# Patient Record
Sex: MALE | Race: WHITE
[De-identification: names, ages, dates, MRNs, and addresses within clinical notes are randomized per-mention and may not be internally consistent; named-entity substitution may affect disease eponyms.]

---

## 2017-04-07 ENCOUNTER — HOSPITAL ENCOUNTER (OUTPATIENT)
Age: 72
Discharge: HOME | End: 2017-04-07
Payer: MEDICARE

## 2017-04-07 DIAGNOSIS — N44.8: Primary | ICD-10-CM

## 2017-04-07 DIAGNOSIS — R33.8: ICD-10-CM

## 2017-04-07 DIAGNOSIS — N50.89: ICD-10-CM

## 2017-04-07 DIAGNOSIS — N40.0: ICD-10-CM

## 2017-05-04 ENCOUNTER — HOSPITAL ENCOUNTER (OUTPATIENT)
Dept: HOSPITAL 76 - RT | Age: 72
Discharge: HOME | End: 2017-05-04
Attending: INTERNAL MEDICINE
Payer: MEDICARE

## 2017-05-04 DIAGNOSIS — I48.91: Primary | ICD-10-CM

## 2017-05-04 PROCEDURE — 93005 ELECTROCARDIOGRAM TRACING: CPT

## 2017-06-08 ENCOUNTER — HOSPITAL ENCOUNTER (OUTPATIENT)
Dept: HOSPITAL 76 - EMS | Age: 72
Discharge: ANOTHER HOSPITAL | End: 2017-06-08
Attending: SURGERY
Payer: MEDICARE

## 2017-06-08 ENCOUNTER — HOSPITAL ENCOUNTER (EMERGENCY)
Dept: HOSPITAL 76 - ED | Age: 72
Discharge: ANOTHER HOSPITAL | End: 2017-06-08
Payer: MEDICARE

## 2017-06-08 VITALS — SYSTOLIC BLOOD PRESSURE: 115 MMHG | DIASTOLIC BLOOD PRESSURE: 69 MMHG

## 2017-06-08 DIAGNOSIS — I10: ICD-10-CM

## 2017-06-08 DIAGNOSIS — N39.0: Primary | ICD-10-CM

## 2017-06-08 DIAGNOSIS — R10.12: ICD-10-CM

## 2017-06-08 DIAGNOSIS — R65.10: ICD-10-CM

## 2017-06-08 DIAGNOSIS — Z86.73: ICD-10-CM

## 2017-06-08 DIAGNOSIS — I48.91: ICD-10-CM

## 2017-06-08 DIAGNOSIS — R55: ICD-10-CM

## 2017-06-08 DIAGNOSIS — R55: Primary | ICD-10-CM

## 2017-06-08 DIAGNOSIS — E78.00: ICD-10-CM

## 2017-06-08 DIAGNOSIS — R03.1: ICD-10-CM

## 2017-06-08 DIAGNOSIS — Z79.01: ICD-10-CM

## 2017-06-08 LAB
ALBUMIN/GLOB SERPL: 1.3 {RATIO} (ref 1–2.2)
ANION GAP SERPL CALCULATED.4IONS-SCNC: 6 MMOL/L (ref 6–13)
BASOPHILS NFR BLD AUTO: 0.1 10^3/UL (ref 0–0.1)
BASOPHILS NFR BLD AUTO: 0.5 %
BILIRUB BLD-MCNC: 1.4 MG/DL (ref 0.2–1)
BUN SERPL-MCNC: 23 MG/DL (ref 6–20)
CALCIUM UR-MCNC: 8.7 MG/DL (ref 8.5–10.3)
CHLORIDE SERPL-SCNC: 105 MMOL/L (ref 101–111)
CO2 SERPL-SCNC: 27 MMOL/L (ref 21–32)
CREAT SERPLBLD-SCNC: 1.1 MG/DL (ref 0.6–1.2)
CUL URINE ADD CHARGE: (no result)
EOSINOPHIL # BLD AUTO: 0 10^3/UL (ref 0–0.7)
EOSINOPHIL NFR BLD AUTO: 0.1 %
ERYTHROCYTE [DISTWIDTH] IN BLOOD BY AUTOMATED COUNT: 14.2 % (ref 12–15)
GFRSERPLBLD MDRD-ARVRAT: 65 ML/MIN/{1.73_M2} (ref 89–?)
GLOBULIN SER-MCNC: 2.8 G/DL (ref 2.1–4.2)
GLUCOSE SERPL-MCNC: 125 MG/DL (ref 70–100)
HCT VFR BLD AUTO: 41.4 % (ref 42–52)
HGB UR QL STRIP: 13.7 G/DL (ref 14–18)
LIPASE SERPL-CCNC: 23 U/L (ref 22–51)
LYMPHOCYTES # SPEC AUTO: 1.1 10^3/UL (ref 1.5–3.5)
LYMPHOCYTES NFR BLD AUTO: 4.7 %
MCH RBC QN AUTO: 32.4 PG (ref 27–31)
MCHC RBC AUTO-ENTMCNC: 33.1 G/DL (ref 32–36)
MCV RBC AUTO: 97.7 FL (ref 80–94)
MONOCYTES # BLD AUTO: 1.7 10^3/UL (ref 0–1)
MONOCYTES NFR BLD AUTO: 7.3 %
NEUTROPHILS # BLD AUTO: 20.3 10^3/UL (ref 1.5–6.6)
NEUTROPHILS # SNV AUTO: 23.2 X10^3/UL (ref 4.8–10.8)
NEUTROPHILS NFR BLD AUTO: 87.4 %
NP AUTO DIFFERENTIAL?: NO
NP MAN DIFFERENTIAL?: YES
NRBC # BLD AUTO: 0 /100WBC
PDW BLD AUTO: 9.1 FL (ref 7.4–11.4)
PH UR STRIP.AUTO: 7 PH (ref 5–7.5)
PLAT MORPH BLD: (no result)
PLATELET BLD QL SMEAR: (no result)
POTASSIUM SERPL-SCNC: 4.1 MMOL/L (ref 3.5–5)
PROT SPEC-MCNC: 6.4 G/DL (ref 6.7–8.2)
RBC MAR: 4.23 10^6/UL (ref 4.7–6.1)
SODIUM SERPLBLD-SCNC: 138 MMOL/L (ref 135–145)
SP GR UR STRIP.AUTO: 1.01 (ref 1–1.03)
UA CHARGE (STRIP ONLY): (no result)
UA W/ MICROSCOPIC CHARGE: YES
UR CULTURE IF IND: (no result)
UROBILINOGEN UR STRIP.AUTO-MCNC: NEGATIVE MG/DL
WBC # BLD: 23.2 X10^3/UL
WBC # UR MANUAL: >25 /HPF (ref 0–3)

## 2017-06-08 PROCEDURE — 83605 ASSAY OF LACTIC ACID: CPT

## 2017-06-08 PROCEDURE — 85025 COMPLETE CBC W/AUTO DIFF WBC: CPT

## 2017-06-08 PROCEDURE — 87181 SC STD AGAR DILUTION PER AGT: CPT

## 2017-06-08 PROCEDURE — 81003 URINALYSIS AUTO W/O SCOPE: CPT

## 2017-06-08 PROCEDURE — 71010: CPT

## 2017-06-08 PROCEDURE — 93005 ELECTROCARDIOGRAM TRACING: CPT

## 2017-06-08 PROCEDURE — 99285 EMERGENCY DEPT VISIT HI MDM: CPT

## 2017-06-08 PROCEDURE — 80053 COMPREHEN METABOLIC PANEL: CPT

## 2017-06-08 PROCEDURE — 83690 ASSAY OF LIPASE: CPT

## 2017-06-08 PROCEDURE — 36415 COLL VENOUS BLD VENIPUNCTURE: CPT

## 2017-06-08 PROCEDURE — 87040 BLOOD CULTURE FOR BACTERIA: CPT

## 2017-06-08 PROCEDURE — 87086 URINE CULTURE/COLONY COUNT: CPT

## 2017-06-08 PROCEDURE — 96361 HYDRATE IV INFUSION ADD-ON: CPT

## 2017-06-08 PROCEDURE — 81001 URINALYSIS AUTO W/SCOPE: CPT

## 2017-06-08 PROCEDURE — 96365 THER/PROPH/DIAG IV INF INIT: CPT

## 2017-06-08 PROCEDURE — 87077 CULTURE AEROBIC IDENTIFY: CPT

## 2017-06-08 PROCEDURE — 84484 ASSAY OF TROPONIN QUANT: CPT

## 2017-06-08 PROCEDURE — 93010 ELECTROCARDIOGRAM REPORT: CPT

## 2017-06-08 NOTE — ED PHYSICIAN DOCUMENTATION
PD HPI SYNCOPE





- Stated complaint


Stated Complaint: NEAR SYNCOPE





- Chief complaint


Chief Complaint: General





- History obtained from


History obtained from: Patient, EMS





- History of Present Illness


Witnessed: Witnessed (spouse)


Timing - onset: Today


Preceding symptoms: Generalized weakness


Contributing factors: Other (Self caths for chronic urinary obstruction.)


Injury occurred: None





- Additional information


Additional information: 


The patient is a 72-year-old male with history of CVA, chronic atrial 

fibrillation, and urinary retention, who presents via ambulance after a near 

syncopal episode this morning. He was sitting at the breakfast table when other 

family members noticed him slumping his head and not responding to verbal 

stimulation. He did not fall, and remained sitting in the chair. They called 911

, and when medics arrived his blood pressure was 50 systolic. An IV was 

established and he was transported without event. Upon arrival in the emergency 

department he reports slight left-sided chest pressure. He denies shortness of 

breath, nausea, vomiting, or abdominal pain. He was chilled during the night, 

and family members report he had cold sweats this morning.


He has no history of similar symptoms in the past.








Review of Systems


Constitutional: reports: Chills


Ears: denies: Tinnitus/ringing


Nose: denies: Congestion


Throat: denies: Sore throat


Cardiac: reports: Chest pain / pressure.  denies: Palpitations


Respiratory: denies: Dyspnea, Cough


GI: denies: Abdominal Pain, Nausea, Vomiting


: reports: Other (self caths).  denies: Dysuria


Skin: denies: Rash


Musculoskeletal: denies: Back pain, Extremity pain


Neurologic: reports: Near syncope.  denies: Headache





PD PAST MEDICAL HISTORY





- Past Medical History


Past Medical History: Yes


Cardiovascular: Hypertension, High cholesterol, Atrial fibrillation


Neuro: CVA


: Retention


Musculoskeletal: Chronic back pain





- Past Surgical History


Past Surgical History: Yes


General: Splenectomy


Ortho: Hip replacement


HEENT: Tonsil/Adenoidectomy





- Present Medications


Home Medications: 


 Ambulatory Orders











 Medication  Instructions  Recorded  Confirmed


 


Dabigatran [Pradaxa] 0 mg PO DAILY 09/22/13 06/08/17


 


Tamsulosin [Flomax] 0.4 mg BID 06/08/17 06/08/17














- Allergies


Allergies/Adverse Reactions: 


 Allergies











Allergy/AdvReac Type Severity Reaction Status Date / Time


 


No Known Drug Allergies Allergy   Verified 06/08/17 11:37














- Social History


Does the pt smoke?: No


Smoking Status: Never smoker


Does the pt drink ETOH?: No


Does the pt have substance abuse?: No





- Immunizations


Immunizations are current?: Yes





PD ED PE NORMAL





- Vitals


Vital signs reviewed: Yes (blood pressure 101/53 with pulse 88.)





- General


General: Alert and oriented X 3





- HEENT


HEENT: Atraumatic, EOMI, Pharynx benign





- Neck


Neck: Supple, no meningeal sign, No adenopathy





- Cardiac


Cardiac: No murmur, Other (Regular rate, irregularly irregular rhythm.)





- Respiratory


Respiratory: No respiratory distress, Clear bilaterally





- Abdomen


Abdomen: Soft, Non tender





- Back


Back: No CVA TTP





- Derm


Derm: No rash





- Extremities


Extremities: No edema, No calf tenderness / cord





- Neuro


Neuro: No motor deficit, Other (Alert, oriented, but mildly confused, 

preferring that his wife answer questions.)





Results





- Vitals


Vitals: 


 Vital Signs - 24 hr











  06/08/17 06/08/17 06/08/17





  11:31 11:41 11:45


 


Temperature 36.4 C L  


 


Heart Rate 88 79 76


 


Respiratory 20 16 16





Rate   


 


Blood Pressure 101/53 L 97/58 L 101/59 L


 


O2 Saturation 96 98 














  06/08/17 06/08/17 06/08/17





  12:00 12:15 12:30


 


Temperature   


 


Heart Rate 73 73 74


 


Respiratory 16 16 16





Rate   


 


Blood Pressure 109/61 95/61 109/60


 


O2 Saturation 100 99 99














  06/08/17 06/08/17 06/08/17





  12:45 13:00 13:30


 


Temperature   


 


Heart Rate 73 74 72


 


Respiratory 16 16 16





Rate   


 


Blood Pressure 101/65 102/68 100/77


 


O2 Saturation  99 97














  06/08/17 06/08/17 06/08/17





  14:00 14:30 15:00


 


Temperature   


 


Heart Rate 72 76 70


 


Respiratory 16 20 16





Rate   


 


Blood Pressure 93/58 L 109/59 L 108/57 L


 


O2 Saturation 97  96














  06/08/17 06/08/17 06/08/17





  16:00 17:00 18:13


 


Temperature   36.6 C


 


Heart Rate 70 70 71


 


Respiratory 16 16 15





Rate   


 


Blood Pressure 113/70 102/78 109/66


 


O2 Saturation 99  96














  06/08/17 06/08/17 06/08/17





  19:19 19:54 20:54


 


Temperature 36.4 C L  36.9 C


 


Heart Rate 65 68 68


 


Respiratory 15 18 16





Rate   


 


Blood Pressure 108/65 113/63 112/62


 


O2 Saturation 99 98 99














  06/08/17





  21:45


 


Temperature 


 


Heart Rate 67


 


Respiratory 18





Rate 


 


Blood Pressure 115/69


 


O2 Saturation 98








 Oxygen











O2 Source                      Room air

















- EKG (time done)


  ** 11:38


Rate: Rate (enter#) (68)


Rhythm: Atrial fibrillation


Axis: Normal


Ischemia: ST elevation c/w ischemia (Minimal ST elevation in anterior leads V2.)


Computer interpretation: Agree with computer





- Labs


Labs: 


 Laboratory Tests











  06/08/17 06/08/17 06/08/17





  12:06 12:06 12:06


 


WBC  23.2 H  


 


RBC  4.23 L  


 


Hgb  13.7 L  


 


Hct  41.4 L  


 


MCV  97.7 H  


 


MCH  32.4 H  


 


MCHC  33.1  


 


RDW  14.2  


 


Plt Count  228  


 


MPV  9.1  


 


Neut #  20.3 H  


 


Lymph #  1.1 L  


 


Mono #  1.7 H  


 


Eos #  0.0  


 


Baso #  0.1  


 


Absolute Nucleated RBC  0.00  


 


Band Neuts % (Manual)  Not Reportable  


 


Nucleated RBCs  0.0  


 


Differential Comment  MANUAL=AUTO DIFF  


 


Manual Slide Review  Indicated  


 


Platelet Estimate  NORMAL (130-450,000)  


 


Platelet Morphology  NORMAL APPEARANCE  


 


RBC Morph Micro Appear  NORMAL APPEARANCE  


 


Sodium   138 


 


Potassium   4.1 


 


Chloride   105 


 


Carbon Dioxide   27 


 


Anion Gap   6.0 


 


BUN   23 H 


 


Creatinine   1.1 


 


Estimated GFR (MDRD)   65 L 


 


Glucose   125 H 


 


Lactic Acid   


 


Calcium   8.7 


 


Total Bilirubin   1.4 H 


 


AST   18 


 


ALT   14 


 


Alkaline Phosphatase   61 


 


Troponin I    < 0.04


 


Total Protein   6.4 L 


 


Albumin   3.6 


 


Globulin   2.8 


 


Albumin/Globulin Ratio   1.3 


 


Lipase   23 


 


Urine Color   


 


Urine Clarity   


 


Urine pH   


 


Ur Specific Gravity   


 


Urine Protein   


 


Urine Glucose (UA)   


 


Urine Ketones   


 


Urine Occult Blood   


 


Urine Nitrite   


 


Urine Bilirubin   


 


Urine Urobilinogen   


 


Ur Leukocyte Esterase   


 


Urine RBC   


 


Urine WBC   


 


Ur Squamous Epith Cells   


 


Urine Bacteria   


 


Ur Microscopic Review   


 


Urine Culture Comments   














  06/08/17 06/08/17





  14:24 15:15


 


WBC  


 


RBC  


 


Hgb  


 


Hct  


 


MCV  


 


MCH  


 


MCHC  


 


RDW  


 


Plt Count  


 


MPV  


 


Neut #  


 


Lymph #  


 


Mono #  


 


Eos #  


 


Baso #  


 


Absolute Nucleated RBC  


 


Band Neuts % (Manual)  


 


Nucleated RBCs  


 


Differential Comment  


 


Manual Slide Review  


 


Platelet Estimate  


 


Platelet Morphology  


 


RBC Morph Micro Appear  


 


Sodium  


 


Potassium  


 


Chloride  


 


Carbon Dioxide  


 


Anion Gap  


 


BUN  


 


Creatinine  


 


Estimated GFR (MDRD)  


 


Glucose  


 


Lactic Acid   1.1


 


Calcium  


 


Total Bilirubin  


 


AST  


 


ALT  


 


Alkaline Phosphatase  


 


Troponin I  


 


Total Protein  


 


Albumin  


 


Globulin  


 


Albumin/Globulin Ratio  


 


Lipase  


 


Urine Color  YELLOW 


 


Urine Clarity  CLOUDY 


 


Urine pH  7.0 


 


Ur Specific Gravity  1.010 


 


Urine Protein  TRACE 


 


Urine Glucose (UA)  NEGATIVE 


 


Urine Ketones  NEGATIVE 


 


Urine Occult Blood  TRACE-LYSE 


 


Urine Nitrite  POSITIVE H 


 


Urine Bilirubin  NEGATIVE 


 


Urine Urobilinogen  0.2 (NORMAL) 


 


Ur Leukocyte Esterase  MODERATE H 


 


Urine RBC  0-5 


 


Urine WBC  >25 H 


 


Ur Squamous Epith Cells  FEW Squamous 


 


Urine Bacteria  Many H 


 


Ur Microscopic Review  INDICATED 


 


Urine Culture Comments  INDICATED 














- Rads (name of study)


  ** Portable CXR


Radiology: Prelim report reviewed, EMP read contemporaneously, See rad report (

No consolidation evident.)





PD MEDICAL DECISION MAKING





- ED course


Complexity details: reviewed results, re-evaluated patient, considered 

differential, d/w patient, d/w family, d/w consultant


ED course: 





The patient's presentation is significant for urinary tract infection with 

systemic inflammatory response syndrome. His urine is positive for greater than 

25 white cells per high-power field, and his white blood cell count is elevated 

at 23.2. Lactate level is normal at 1.1. Blood cultures x2 were drawn and are 

pending.


Electrocardiogram reveals no acute ischemic abnormalities, and troponin level 

is normal.


Treatment in the emergency department included administration of 4 baby aspirin 

orally. Nitroglycerin was considered, but the patient's mild chest discomfort 

completely resolved spontaneously. Normal saline 2 L administered IV, and 

ceftriaxone 1 g was administered IV after blood cultures had been drawn.


I discussed the patient's condition with transfer staff at [facility], who arranged 

for ambulance transport to [facility]. He is being 

transferred by \[facility]\"" ambulance. Transfer forms were completed. It should be noted 

that there was a long delay between the decision to transfer the patient and 

the arrival of the \[facility]\"" transfer crew, causing great consternation and 

dissatisfaction on the part of the patient's wife.





Departure





- Departure


Disposition: 02 Transfer Acute Care Hosp


Clinical Impression: 


 Near syncope, SIRS (systemic inflammatory response syndrome), Chronic atrial 

fibrillation





Urinary tract infection


Qualifiers:


 Urinary tract infection type: site unspecified Hematuria presence: with 

hematuria Qualified Code(s): N39.0 - Urinary tract infection, site not specified


Condition: Fair


Discharge Date/Time: 06/08/17 21:59

## 2017-06-08 NOTE — XRAY PRELIMINARY REPORT
Accession: [id number]

Exam: XR Chest 1 View

 

IMPRESSION: No consolidation evident.

 

[facility]

 

SITE ID: [id number]

## 2017-06-08 NOTE — XRAY REPORT
EXAM: 

CHEST RADIOGRAPHY

 

EXAM DATE: 6/8/2017 12:47 PM.

 

CLINICAL HISTORY: Chest pain.

 

COMPARISON: None.

 

TECHNIQUE: 1 view. AP portable

 

FINDINGS:

Lungs/Pleura: No focal opacities evident. No pleural effusion. No pneumothorax.

 

Mediastinum: Atherosclerotic aortic calcifications.

 

Other: None.

 

IMPRESSION: No consolidation evident.

 

[facility]

Referring Provider Line: [phone]

 

SITE ID: [id number]

## 2017-09-26 ENCOUNTER — HOSPITAL ENCOUNTER (EMERGENCY)
Dept: HOSPITAL 76 - ED | Age: 72
Discharge: HOME | End: 2017-09-26
Payer: MEDICARE

## 2017-09-26 VITALS — SYSTOLIC BLOOD PRESSURE: 130 MMHG | DIASTOLIC BLOOD PRESSURE: 79 MMHG

## 2017-09-26 DIAGNOSIS — Z86.73: ICD-10-CM

## 2017-09-26 DIAGNOSIS — Z87.891: ICD-10-CM

## 2017-09-26 DIAGNOSIS — I10: ICD-10-CM

## 2017-09-26 DIAGNOSIS — N30.00: Primary | ICD-10-CM

## 2017-09-26 DIAGNOSIS — Z96.649: ICD-10-CM

## 2017-09-26 DIAGNOSIS — E78.00: ICD-10-CM

## 2017-09-26 LAB
CUL URINE ADD CHARGE: (no result)
PH UR STRIP.AUTO: 6 PH (ref 5–7.5)
SP GR UR STRIP.AUTO: 1.01 (ref 1–1.03)
UA CHARGE (STRIP ONLY): (no result)
UA W/ MICROSCOPIC CHARGE: YES
UR CULTURE IF IND: (no result)
UROBILINOGEN UR STRIP.AUTO-MCNC: NEGATIVE MG/DL
WBC # UR MANUAL: >25 /HPF (ref 0–3)

## 2017-09-26 PROCEDURE — 99284 EMERGENCY DEPT VISIT MOD MDM: CPT

## 2017-09-26 PROCEDURE — 81001 URINALYSIS AUTO W/SCOPE: CPT

## 2017-09-26 PROCEDURE — 87086 URINE CULTURE/COLONY COUNT: CPT

## 2017-09-26 PROCEDURE — 81003 URINALYSIS AUTO W/O SCOPE: CPT

## 2017-09-26 PROCEDURE — 99283 EMERGENCY DEPT VISIT LOW MDM: CPT

## 2017-09-26 NOTE — ED PHYSICIAN DOCUMENTATION
PD HPI MALE 





- Stated complaint


Stated Complaint: DIZZY





- Chief complaint


Chief Complaint: General





- History obtained from


History obtained from: Patient, Family





- History of Present Illness


Timing - onset: How many days ago (5)


Timing - duration: Days (5)


Timing - details: Gradual onset, Still present


Associated symptoms: Other (light headed/dizzy periodically for the past week 

progressed.)


PD HPI MALE  CONTRIB FACTORS: Other (self caths 2 times per day)


Similar symptoms before: Diagnosis (UTI)


Recently seen: Not recently seen





- Additional information


Additional information: 





72-year-old male status post CVA who has had chronic atrial fibrillation and 

urinary retention does self cathing twice per day and he is able to void in 

between.  He has had issues with infection.  He was admitted to [facility] in 

[place] in June of this year with urosepsis.  He has had a second infection in 

August of this year which was discovered when they went to do a cystoscopy.  He 

does not recall the antibiotics she was on with these. The patient's wife saw 

that he was having some trouble and she felt that he might be dehydrated and 

had him drink 4 glasses of water.  The patient states that he does feel 

somewhat better now.





Review of Systems


Constitutional: reports: Fatigue, Sweats.  denies: Fever, Chills


Eyes: denies: Decreased vision


Ears: denies: Ear pain


Nose: denies: Congestion


Throat: denies: Sore throat


Cardiac: denies: Chest pain / pressure, Palpitations


Respiratory: denies: Dyspnea, Cough


GI: denies: Abdominal Pain, Nausea, Vomiting


: denies: Dysuria


Skin: denies: Rash


Musculoskeletal: denies: Neck pain, Back pain, Extremity pain


Neurologic: reports: Other (dizziness).  denies: Generalized weakness, Focal 

weakness, Numbness





PD PAST MEDICAL HISTORY





- Past Medical History


Cardiovascular: Hypertension, High cholesterol, Atrial fibrillation


Respiratory: None


Neuro: CVA


Endocrine/Autoimmune: None


GI: None


: Benign prostate hypertrophy, Retention


Psych: None


Musculoskeletal: Chronic back pain


Derm: None





- Past Surgical History


Past Surgical History: Yes


General: Splenectomy


Ortho: Hip replacement


HEENT: Tonsil/Adenoidectomy





- Present Medications


Home Medications: 


 Ambulatory Orders











 Medication  Instructions  Recorded  Confirmed


 


Dabigatran [Pradaxa] 0 mg PO DAILY 09/22/13 09/26/17


 


Tamsulosin [Flomax] 0.4 mg PO BID 06/08/17 09/26/17


 


Cholecalciferol (Vitamin D3) 1,000 mg PO DAILY 09/26/17 09/26/17





[Vitamin D3]   


 


Levofloxacin [Levaquin] 500 mg PO DAILY #7 tablet 09/26/17 














- Allergies


Allergies/Adverse Reactions: 


 Allergies











Allergy/AdvReac Type Severity Reaction Status Date / Time


 


No Known Drug Allergies Allergy   Verified 06/08/17 11:37














- Social History


Does the pt smoke?: No


Smoking Status: Former smoker


Does the pt drink ETOH?: Yes


Does the pt have substance abuse?: No





- Immunizations


Immunizations are current?: Yes





PD ED PE NORMAL





- Vitals


Vital signs reviewed: Yes (hypertensive mild )





- General


General: No acute distress, Well developed/nourished





- HEENT


HEENT: Atraumatic, PERRL





- Neck


Neck: Supple, no meningeal sign





- Cardiac


Cardiac: No murmur, Other (irregularly irregular)





- Respiratory


Respiratory: No respiratory distress, Clear bilaterally





- Abdomen


Abdomen: Soft, Non tender





- Back


Back: No CVA TTP, No spinal TTP





- Extremities


Extremities: No deformity, No edema





- Neuro


Neuro: No motor deficit, No sensory deficit





- Psych


Psych: Normal mood, Normal affect





Results





- Vitals


Vitals: 





 Vital Signs - 24 hr











  09/26/17 09/26/17





  11:58 12:32


 


Temperature 36.6 C 


 


Heart Rate 60 70


 


Respiratory 18 16





Rate  


 


Blood Pressure 124/86 H 130/91 H


 


O2 Saturation 99 97








 Oxygen











O2 Source                      Room air

















- Labs


Labs: 





 Laboratory Tests











  09/26/17





  12:18


 


Urine Color  YELLOW


 


Urine Clarity  CLEAR


 


Urine pH  6.0


 


Ur Specific Gravity  1.010


 


Urine Protein  NEGATIVE


 


Urine Glucose (UA)  NEGATIVE


 


Urine Ketones  NEGATIVE


 


Urine Occult Blood  TRACE-INTA


 


Urine Nitrite  POSITIVE H


 


Urine Bilirubin  NEGATIVE


 


Urine Urobilinogen  0.2 (NORMAL)


 


Ur Leukocyte Esterase  SMALL H


 


Urine RBC  0-5


 


Urine WBC  >25 H


 


Ur Squamous Epith Cells  RARE Squamous


 


Urine Bacteria  Few


 


Urine Mucus  Few Strands


 


Ur Microscopic Review  INDICATED


 


Urine Culture Comments  INDICATED














Procedures





- IVC sono (time)


  ** 1340


Bedside IVC sono: IVC measures (cm) (1.74), Euvolemia





PD MEDICAL DECISION MAKING





- ED course


Complexity details: reviewed old records, reviewed results, re-evaluated patient

, considered differential, d/w patient, d/w family


ED course: 





72-year-old male has had infection with self cathing.  Today he has some 

symptoms to suggest possible infection and review of the urine under the 

microscope shows obvious evidence of infection.  We have cultures here of 2 

separate species of E. coli both which are sensitive to Levaquin.  He is 

administered Levaquin 500 mg orally in the emergency department.  He does not 

appear ill or septic at this time.





Departure





- Departure


Disposition: 01 Home, Self Care


Clinical Impression: 


Urinary tract infection


Qualifiers:


 Urinary tract infection type: acute cystitis Hematuria presence: without 

hematuria Qualified Code(s): N30.00 - Acute cystitis without hematuria





Condition: Stable


Instructions:  ED UTI Cystitis Male


Follow-Up: 


Oleksandr Zamorano MD [Primary Care Provider] - 


Prescriptions: 


Levofloxacin [Levaquin] 500 mg PO DAILY #7 tablet

## 2018-01-21 ENCOUNTER — HOSPITAL ENCOUNTER (EMERGENCY)
Dept: HOSPITAL 76 - ED | Age: 73
Discharge: HOME | End: 2018-01-21
Payer: MEDICARE

## 2018-01-21 VITALS — SYSTOLIC BLOOD PRESSURE: 132 MMHG | DIASTOLIC BLOOD PRESSURE: 96 MMHG

## 2018-01-21 DIAGNOSIS — Z86.73: ICD-10-CM

## 2018-01-21 DIAGNOSIS — I10: ICD-10-CM

## 2018-01-21 DIAGNOSIS — E78.00: ICD-10-CM

## 2018-01-21 DIAGNOSIS — N40.0: ICD-10-CM

## 2018-01-21 DIAGNOSIS — I48.91: ICD-10-CM

## 2018-01-21 DIAGNOSIS — Z79.01: ICD-10-CM

## 2018-01-21 DIAGNOSIS — R55: Primary | ICD-10-CM

## 2018-01-21 LAB
ANION GAP SERPL CALCULATED.4IONS-SCNC: 11 MMOL/L (ref 6–13)
BASOPHILS NFR BLD AUTO: 0.1 10^3/UL (ref 0–0.1)
BASOPHILS NFR BLD AUTO: 1.1 %
BUN SERPL-MCNC: 14 MG/DL (ref 6–20)
CALCIUM UR-MCNC: 9.5 MG/DL (ref 8.5–10.3)
CHLORIDE SERPL-SCNC: 99 MMOL/L (ref 101–111)
CLARITY UR REFRACT.AUTO: CLEAR
CO2 SERPL-SCNC: 26 MMOL/L (ref 21–32)
CREAT SERPLBLD-SCNC: 0.9 MG/DL (ref 0.6–1.2)
EOSINOPHIL # BLD AUTO: 0.2 10^3/UL (ref 0–0.7)
EOSINOPHIL NFR BLD AUTO: 2.3 %
ERYTHROCYTE [DISTWIDTH] IN BLOOD BY AUTOMATED COUNT: 13.5 % (ref 12–15)
GFRSERPLBLD MDRD-ARVRAT: 82 ML/MIN/{1.73_M2} (ref 89–?)
GLUCOSE SERPL-MCNC: 133 MG/DL (ref 70–100)
GLUCOSE UR QL STRIP.AUTO: NEGATIVE MG/DL
HGB UR QL STRIP: 15.9 G/DL (ref 14–18)
KETONES UR QL STRIP.AUTO: NEGATIVE MG/DL
LYMPHOCYTES # SPEC AUTO: 1.6 10^3/UL (ref 1.5–3.5)
LYMPHOCYTES NFR BLD AUTO: 16.1 %
MCH RBC QN AUTO: 33 PG (ref 27–31)
MCHC RBC AUTO-ENTMCNC: 33.9 G/DL (ref 32–36)
MCV RBC AUTO: 97.3 FL (ref 80–94)
MONOCYTES # BLD AUTO: 0.6 10^3/UL (ref 0–1)
MONOCYTES NFR BLD AUTO: 6.7 %
NEUTROPHILS # BLD AUTO: 7.1 10^3/UL (ref 1.5–6.6)
NEUTROPHILS # SNV AUTO: 9.7 X10^3/UL (ref 4.8–10.8)
NEUTROPHILS NFR BLD AUTO: 73.8 %
NITRITE UR QL STRIP.AUTO: NEGATIVE
PDW BLD AUTO: 9.4 FL (ref 7.4–11.4)
PH UR STRIP.AUTO: 7.5 PH (ref 5–7.5)
PLAT MORPH BLD: (no result)
PLATELET # BLD: 216 10^3/UL (ref 130–450)
PLATELET BLD QL SMEAR: (no result)
PROT UR STRIP.AUTO-MCNC: NEGATIVE MG/DL
RBC # UR STRIP.AUTO: NEGATIVE /UL
RBC MAR: 4.83 10^6/UL (ref 4.7–6.1)
RBC MORPH BLD: (no result)
SODIUM SERPLBLD-SCNC: 136 MMOL/L (ref 135–145)
SP GR UR STRIP.AUTO: 1.01 (ref 1–1.03)
UROBILINOGEN UR QL STRIP.AUTO: (no result) E.U./DL
UROBILINOGEN UR STRIP.AUTO-MCNC: NEGATIVE MG/DL

## 2018-01-21 PROCEDURE — 80048 BASIC METABOLIC PNL TOTAL CA: CPT

## 2018-01-21 PROCEDURE — 83605 ASSAY OF LACTIC ACID: CPT

## 2018-01-21 PROCEDURE — 87086 URINE CULTURE/COLONY COUNT: CPT

## 2018-01-21 PROCEDURE — 87040 BLOOD CULTURE FOR BACTERIA: CPT

## 2018-01-21 PROCEDURE — 99284 EMERGENCY DEPT VISIT MOD MDM: CPT

## 2018-01-21 PROCEDURE — 81001 URINALYSIS AUTO W/SCOPE: CPT

## 2018-01-21 PROCEDURE — 93005 ELECTROCARDIOGRAM TRACING: CPT

## 2018-01-21 PROCEDURE — 81003 URINALYSIS AUTO W/O SCOPE: CPT

## 2018-01-21 PROCEDURE — 99283 EMERGENCY DEPT VISIT LOW MDM: CPT

## 2018-01-21 PROCEDURE — 85025 COMPLETE CBC W/AUTO DIFF WBC: CPT

## 2018-01-21 PROCEDURE — 36415 COLL VENOUS BLD VENIPUNCTURE: CPT

## 2018-01-21 NOTE — ED PHYSICIAN DOCUMENTATION
History of Present Illness





- Stated complaint


Stated Complaint: LIGHT HEADED





- Chief complaint


Chief Complaint: General





- Additonal information


Additional information: 





hx from pt


72 male


had approx 15 min of diffuse weakness this AM


no focal numbness or weakness


no trouble with vision hearing speech


no CP or palpitations


no SOA


not vertigo


sx better now


no new meds or med changes


he occasionally gets UTIs and want to be checked for that








Review of Systems


Constitutional: denies: Fever, Chills


Cardiac: denies: Chest pain / pressure, Palpitations


Respiratory: denies: Dyspnea, Cough


GI: denies: Abdominal Pain, Nausea, Vomiting, Diarrhea, Bloody / black stool


: reports: Other (self cath)


Neurologic: reports: Generalized weakness (X 15 min several hr ago better now)


Endocrine: reports: Easy bruising / bleeding





PD PAST MEDICAL HISTORY





- Past Medical History


Past Medical History: Yes


Cardiovascular: Hypertension, High cholesterol, Atrial fibrillation


Respiratory: None


Neuro: CVA


Endocrine/Autoimmune: None


GI: None


: Benign prostate hypertrophy, Retention


Psych: None


Musculoskeletal: Chronic back pain


Derm: None





- Past Surgical History


Past Surgical History: Yes


General: Splenectomy


Ortho: Hip replacement


HEENT: Tonsil/Adenoidectomy





- Present Medications


Home Medications: 


 Ambulatory Orders











 Medication  Instructions  Recorded  Confirmed


 


Dabigatran [Pradaxa] 0 mg PO DAILY 09/22/13 01/21/18


 


Tamsulosin [Flomax] 0.4 mg PO BID 06/08/17 01/21/18


 


Cholecalciferol (Vitamin D3) 1,000 mg PO DAILY 09/26/17 01/21/18





[Vitamin D3]   














- Allergies


Allergies/Adverse Reactions: 


 Allergies











Allergy/AdvReac Type Severity Reaction Status Date / Time


 


No Known Drug Allergies Allergy   Verified 01/21/18 10:21














- Social History


Does the pt smoke?: No


Smoking Status: Never smoker


Does the pt drink ETOH?: Yes


Does the pt have substance abuse?: No





- Immunizations


Immunizations are current?: Yes





PD ED PE NORMAL





- Vitals


Vital signs reviewed: Yes (small dip in BP sitting up but not with standing and 

pt had no sx standing )





- General


General: Alert and oriented X 3





- HEENT


HEENT: PERRL





- Neck


Neck: Supple, no meningeal sign





- Cardiac


Cardiac: RRR





- Respiratory


Respiratory: No respiratory distress, Clear bilaterally





- Abdomen


Abdomen: Soft, Non tender





- Derm


Derm: Normal color





- Neuro


Neuro: Alert and oriented X 3, CNs 2-12 intact, No motor deficit, No sensory 

deficit, Normal speech





Results





- Vitals


Vitals: 


 Vital Signs - 24 hr











  01/21/18 01/21/18 01/21/18





  10:15 11:52 13:25


 


Temperature 36.2 C L  


 


Heart Rate 76  73


 


Heart Rate [  77 





Sitting]   


 


Heart Rate [  91 





Standing]   


 


Heart Rate [  65 





Supine]   


 


Respiratory 16  20





Rate   


 


Blood Pressure 122/77  132/96 H


 


Blood Pressure  95/63 





[Sitting]   


 


Blood Pressure  115/75 





[Standing]   


 


Blood Pressure  114/73 





[Supine]   


 


O2 Saturation 99  99








 Oxygen











O2 Source                      Room air

















- EKG (time done)


  ** 1139


Rate: Rate (enter#) (44)


Rhythm: Atrial fibrillation


Ischemia: Normal ST segments





- Labs


Labs: 


 Laboratory Tests











  01/21/18 01/21/18 01/21/18





  10:25 10:43 10:43


 


WBC   9.7 


 


RBC   4.83 


 


Hgb   15.9 


 


Hct   46.9 


 


MCV   97.3 H 


 


MCH   33.0 H 


 


MCHC   33.9 


 


RDW   13.5 


 


Plt Count   216 


 


MPV   9.4 


 


Neut #   7.1 H 


 


Lymph #   1.6 


 


Mono #   0.6 


 


Eos #   0.2 


 


Baso #   0.1 


 


Absolute Nucleated RBC   0.01 


 


Nucleated RBC %   0.1 


 


Manual Slide Review   Indicated 


 


Platelet Estimate   NORMAL (130-450,000) 


 


Platelet Morphology   1+ LARGE PLATELETS 


 


RBC Morph Micro Appear   NORMAL APPEARANCE 


 


Sodium    136


 


Potassium    4.0


 


Chloride    99 L


 


Carbon Dioxide    26


 


Anion Gap    11.0


 


BUN    14


 


Creatinine    0.9


 


Estimated GFR (MDRD)    82 L


 


Glucose    133 H


 


Lactic Acid   


 


Calcium    9.5


 


Urine Color  YELLOW  


 


Urine Clarity  CLEAR  


 


Urine pH  7.5  


 


Ur Specific Gravity  1.015  


 


Urine Protein  NEGATIVE  


 


Urine Glucose (UA)  NEGATIVE  


 


Urine Ketones  NEGATIVE  


 


Urine Occult Blood  NEGATIVE  


 


Urine Nitrite  NEGATIVE  


 


Urine Bilirubin  NEGATIVE  


 


Urine Urobilinogen  0.2 (NORMAL)  


 


Ur Leukocyte Esterase  NEGATIVE  


 


Ur Microscopic Review  NOT INDICATED  


 


Urine Culture Comments  NOT INDICATED  














  01/21/18





  10:43


 


WBC 


 


RBC 


 


Hgb 


 


Hct 


 


MCV 


 


MCH 


 


MCHC 


 


RDW 


 


Plt Count 


 


MPV 


 


Neut # 


 


Lymph # 


 


Mono # 


 


Eos # 


 


Baso # 


 


Absolute Nucleated RBC 


 


Nucleated RBC % 


 


Manual Slide Review 


 


Platelet Estimate 


 


Platelet Morphology 


 


RBC Morph Micro Appear 


 


Sodium 


 


Potassium 


 


Chloride 


 


Carbon Dioxide 


 


Anion Gap 


 


BUN 


 


Creatinine 


 


Estimated GFR (MDRD) 


 


Glucose 


 


Lactic Acid  1.7


 


Calcium 


 


Urine Color 


 


Urine Clarity 


 


Urine pH 


 


Ur Specific Gravity 


 


Urine Protein 


 


Urine Glucose (UA) 


 


Urine Ketones 


 


Urine Occult Blood 


 


Urine Nitrite 


 


Urine Bilirubin 


 


Urine Urobilinogen 


 


Ur Leukocyte Esterase 


 


Ur Microscopic Review 


 


Urine Culture Comments 














PD MEDICAL DECISION MAKING





- ED course


ED course: 





traige VS and orthostatioc VS all fine but EKG HR  was 44


will recheck


not anemic


nl lytes


sx were not c/w stroke


no UTI


he has had prior SIRS /sepsis 2/2 UTI but that does not seem to be the issue 

today


if anything cause this episode of weakness and low BP I suspect it was 

bradycardia


suggested place in obs but pt and wife reluctant to do that so will try and get 

echo in ER and keep pt on tele for several more hr


echo not available today


and when I tono to check in on pt I found him standing at the end of his bed 

doing calisthenics


think he is OK to dc for today - with wife to keep an eye on him, they have a 

BP HR cuff to monitor VS, see PMD tomorrow for recheck and get echo arrange








Departure





- Departure


Disposition: 01 Home, Self Care


Clinical Impression: 


 Near syncope





Condition: Good


Instructions:  ED Near Syncope Unkn


Follow-Up: 


Oleksandr Zamorano MD [Primary Care Provider] - 


Comments: 


Please drink plenty of fluids


Check your blood pressure and heart rate frequently at home and keep a record - 

if the heart rate is less than 45 or the systolic blood pressure is less than 

100 come back to the ER.


Otherwise follow up with your PMD tomorrow to get an echocardiogram arranged

## 2019-08-21 ENCOUNTER — HOSPITAL ENCOUNTER (OUTPATIENT)
Dept: HOSPITAL 76 - DI | Age: 74
Discharge: HOME | End: 2019-08-21
Attending: INTERNAL MEDICINE
Payer: MEDICARE

## 2019-08-21 DIAGNOSIS — N43.3: ICD-10-CM

## 2019-08-21 DIAGNOSIS — N50.89: Primary | ICD-10-CM

## 2019-08-21 PROCEDURE — 76870 US EXAM SCROTUM: CPT

## 2019-08-22 NOTE — ULTRASOUND REPORT
Reason:  TESTICULAR MASS

Procedure Date:  08/21/2019   

Accession Number:  [id number] / [id number]                    

Procedure:  US  - Testicle CPT Code:  

 

FULL RESULT:

 

 

EXAM:

SCROTAL ULTRASOUND

 

EXAM DATE: 8/21/2019 02:14 PM.

 

CLINICAL HISTORY: Follow-up for right scrotal mass.

 

COMPARISON: Scrotal ultrasound 04/07/2017.

 

TECHNIQUE: Real-time scanning was performed with static images obtained. 

Color-flow images were utilized.

 

FINDINGS:

Right:

Testis: 3.9 x 1.9 x 3.1 cm. Prominent rete testis with tubular ectasia 

again seen. Otherwise normal echotexture. No suspicious mass, 

calcification, or abnormal blood flow.

Epididymis: Better seen than before. Head 1 x 0.8 x 0.9 cm. Remainder of 

the epididymis appears heterogeneous but not enlarged or hyperemic.

Hydrocele: Small.

Varicocele: None.

 

Other: Redemonstration of extratesticular multilocular cystic lesion 

measuring approximately 8.2 x 7.6 x 9.9 cm (previously 10 x 7.6 x 9.6 cm) 

without convincing blood flow on color Doppler.

 

Left:

Testis: 4.7 x 1.9 x 2.8 cm. Prominent rete testes as before. Otherwise 

normal echotexture. No mass, calcification, or abnormal blood flow.

Epididymis: Head 1.1 x 0.8 x 0.7 cm. Normal echotexture. No mass or 

abnormal blood flow.

Hydrocele: Small.

Varicocele: None.

 

Other: No scrotal hernia.

IMPRESSION:

1. Similar prominent bilateral rete testes. Otherwise unremarkable 

bilateral testes.

2. Better depicted of a mildly heterogeneous right epididymis without 

acute findings or focal mass.

3. Redemonstration of the prominent extratesticular multilocular cystic 

lesion in the right hemiscrotum. Differential includes organized 

hydrocele or other fluid collection mimicking a cystic mass. Separation 

of this structure from the better depicted right epididymis makes 

spermatocele less likely.

4. Unremarkable left epididymis.

5. Otherwise small bilateral hydroceles.

 

[facility]

## 2020-03-14 ENCOUNTER — HOSPITAL ENCOUNTER (INPATIENT)
Dept: HOSPITAL 76 - ED | Age: 75
LOS: 4 days | Discharge: HOME | DRG: 871 | End: 2020-03-18
Attending: NURSE PRACTITIONER | Admitting: INTERNAL MEDICINE
Payer: MEDICARE

## 2020-03-14 DIAGNOSIS — Z90.49: ICD-10-CM

## 2020-03-14 DIAGNOSIS — N31.9: ICD-10-CM

## 2020-03-14 DIAGNOSIS — Z96.652: ICD-10-CM

## 2020-03-14 DIAGNOSIS — R33.8: ICD-10-CM

## 2020-03-14 DIAGNOSIS — Z87.891: ICD-10-CM

## 2020-03-14 DIAGNOSIS — Z96.642: ICD-10-CM

## 2020-03-14 DIAGNOSIS — I48.91: ICD-10-CM

## 2020-03-14 DIAGNOSIS — M54.9: ICD-10-CM

## 2020-03-14 DIAGNOSIS — N12: ICD-10-CM

## 2020-03-14 DIAGNOSIS — J43.9: ICD-10-CM

## 2020-03-14 DIAGNOSIS — R19.7: ICD-10-CM

## 2020-03-14 DIAGNOSIS — Z51.5: ICD-10-CM

## 2020-03-14 DIAGNOSIS — I69.911: ICD-10-CM

## 2020-03-14 DIAGNOSIS — I48.20: ICD-10-CM

## 2020-03-14 DIAGNOSIS — N39.0: Primary | ICD-10-CM

## 2020-03-14 DIAGNOSIS — E78.5: ICD-10-CM

## 2020-03-14 DIAGNOSIS — I25.10: ICD-10-CM

## 2020-03-14 DIAGNOSIS — G93.41: ICD-10-CM

## 2020-03-14 DIAGNOSIS — F03.91: ICD-10-CM

## 2020-03-14 DIAGNOSIS — A41.51: ICD-10-CM

## 2020-03-14 DIAGNOSIS — E78.00: ICD-10-CM

## 2020-03-14 DIAGNOSIS — N40.1: ICD-10-CM

## 2020-03-14 DIAGNOSIS — G89.29: ICD-10-CM

## 2020-03-14 DIAGNOSIS — Z91.81: ICD-10-CM

## 2020-03-14 DIAGNOSIS — R26.9: ICD-10-CM

## 2020-03-14 DIAGNOSIS — I10: ICD-10-CM

## 2020-03-14 LAB
ALBUMIN DIAFP-MCNC: 3.5 G/DL (ref 3.2–5.5)
ALBUMIN/GLOB SERPL: 1.1 {RATIO} (ref 1–2.2)
ALP SERPL-CCNC: 42 IU/L (ref 42–121)
ALT SERPL W P-5'-P-CCNC: 11 IU/L (ref 10–60)
ANION GAP SERPL CALCULATED.4IONS-SCNC: 8 MMOL/L (ref 6–13)
APTT PPP: 29.4 SECS (ref 24.9–33.3)
AST SERPL W P-5'-P-CCNC: 18 IU/L (ref 10–42)
BASOPHILS # BLD MANUAL: 0 10^3/UL (ref 0–0.1)
BASOPHILS NFR BLD AUTO: 0.7 %
BILIRUB BLD-MCNC: 0.6 MG/DL (ref 0.2–1)
BUN SERPL-MCNC: 19 MG/DL (ref 6–20)
CALCIUM UR-MCNC: 8.5 MG/DL (ref 8.5–10.3)
CHLORIDE SERPL-SCNC: 101 MMOL/L (ref 101–111)
CLARITY UR REFRACT.AUTO: (no result)
CO2 SERPL-SCNC: 25 MMOL/L (ref 21–32)
CREAT SERPLBLD-SCNC: 1.1 MG/DL (ref 0.6–1.2)
EOSINOPHIL # BLD MANUAL: 0 10^3/UL (ref 0–0.7)
EOSINOPHIL NFR BLD AUTO: 0.1 %
ERYTHROCYTE [DISTWIDTH] IN BLOOD BY AUTOMATED COUNT: 13.2 % (ref 12–15)
GFRSERPLBLD MDRD-ARVRAT: 65 ML/MIN/{1.73_M2} (ref 89–?)
GLOBULIN SER-MCNC: 3.2 G/DL (ref 2.1–4.2)
GLUCOSE SERPL-MCNC: 137 MG/DL (ref 70–100)
GLUCOSE UR QL STRIP.AUTO: NEGATIVE MG/DL
HGB UR QL STRIP: 13.8 G/DL (ref 14–18)
INR PPP: 1.2 (ref 0.8–1.2)
KETONES UR QL STRIP.AUTO: NEGATIVE MG/DL
LIPASE SERPL-CCNC: 35 U/L (ref 22–51)
LYMPH ABN NFR BLD MANUAL: 0 %
LYMPHOBLASTS # BLD: 5 %
LYMPHOCYTES # BLD MANUAL: 0.8 10^3/UL (ref 1.5–3.5)
LYMPHOCYTES NFR BLD AUTO: 5.2 %
MANUAL DIF COMMENT BLD-IMP: (no result)
MCH RBC QN AUTO: 32.6 PG (ref 27–31)
MCHC RBC AUTO-ENTMCNC: 32.5 G/DL (ref 32–36)
MCV RBC AUTO: 100.2 FL (ref 80–94)
MONOCYTES # BLD MANUAL: 0.8 10^3/UL (ref 0–1)
MONOCYTES NFR BLD AUTO: 9.3 %
NEUTROPHILS # SNV AUTO: 15.1 X10^3/UL (ref 4.8–10.8)
NEUTROPHILS NFR BLD AUTO: 84 %
NEUTS BAND NFR BLD: 0 %
NITRITE UR QL STRIP.AUTO: POSITIVE
PDW BLD AUTO: 10.9 FL (ref 7.4–11.4)
PH UR STRIP.AUTO: 6 PH (ref 5–7.5)
PLAT MORPH BLD: (no result)
PLATELET # BLD: 239 10^3/UL (ref 130–450)
PLATELET BLD QL SMEAR: (no result)
PROT SPEC-MCNC: 6.7 G/DL (ref 6.7–8.2)
PROT UR STRIP.AUTO-MCNC: 100 MG/DL
PROTHROM ACT/NOR PPP: 13.7 SECS (ref 9.9–12.6)
RBC # UR STRIP.AUTO: (no result) /UL
RBC # URNS HPF: (no result) /HPF (ref 0–5)
RBC MAR: 4.23 10^6/UL (ref 4.7–6.1)
RBC MORPH BLD: (no result)
SODIUM SERPLBLD-SCNC: 134 MMOL/L (ref 135–145)
SP GR UR STRIP.AUTO: 1.02 (ref 1–1.03)
SQUAMOUS URNS QL MICRO: (no result)
UROBILINOGEN UR QL STRIP.AUTO: (no result) E.U./DL
UROBILINOGEN UR STRIP.AUTO-MCNC: NEGATIVE MG/DL
WBC CLUMPS URNS QL MICRO: PRESENT

## 2020-03-14 PROCEDURE — 97165 OT EVAL LOW COMPLEX 30 MIN: CPT

## 2020-03-14 PROCEDURE — 76770 US EXAM ABDO BACK WALL COMP: CPT

## 2020-03-14 PROCEDURE — 84443 ASSAY THYROID STIM HORMONE: CPT

## 2020-03-14 PROCEDURE — 87181 SC STD AGAR DILUTION PER AGT: CPT

## 2020-03-14 PROCEDURE — 99285 EMERGENCY DEPT VISIT HI MDM: CPT

## 2020-03-14 PROCEDURE — 36415 COLL VENOUS BLD VENIPUNCTURE: CPT

## 2020-03-14 PROCEDURE — 83605 ASSAY OF LACTIC ACID: CPT

## 2020-03-14 PROCEDURE — 85025 COMPLETE CBC W/AUTO DIFF WBC: CPT

## 2020-03-14 PROCEDURE — 87040 BLOOD CULTURE FOR BACTERIA: CPT

## 2020-03-14 PROCEDURE — 51798 US URINE CAPACITY MEASURE: CPT

## 2020-03-14 PROCEDURE — 85730 THROMBOPLASTIN TIME PARTIAL: CPT

## 2020-03-14 PROCEDURE — 99222 1ST HOSP IP/OBS MODERATE 55: CPT

## 2020-03-14 PROCEDURE — 80053 COMPREHEN METABOLIC PANEL: CPT

## 2020-03-14 PROCEDURE — 85651 RBC SED RATE NONAUTOMATED: CPT

## 2020-03-14 PROCEDURE — 86140 C-REACTIVE PROTEIN: CPT

## 2020-03-14 PROCEDURE — 85610 PROTHROMBIN TIME: CPT

## 2020-03-14 PROCEDURE — 96365 THER/PROPH/DIAG IV INF INIT: CPT

## 2020-03-14 PROCEDURE — 81003 URINALYSIS AUTO W/O SCOPE: CPT

## 2020-03-14 PROCEDURE — 83690 ASSAY OF LIPASE: CPT

## 2020-03-14 PROCEDURE — 93306 TTE W/DOPPLER COMPLETE: CPT

## 2020-03-14 PROCEDURE — 99291 CRITICAL CARE FIRST HOUR: CPT

## 2020-03-14 PROCEDURE — 97161 PT EVAL LOW COMPLEX 20 MIN: CPT

## 2020-03-14 PROCEDURE — 71045 X-RAY EXAM CHEST 1 VIEW: CPT

## 2020-03-14 PROCEDURE — 87086 URINE CULTURE/COLONY COUNT: CPT

## 2020-03-14 PROCEDURE — 80048 BASIC METABOLIC PNL TOTAL CA: CPT

## 2020-03-14 PROCEDURE — 81001 URINALYSIS AUTO W/SCOPE: CPT

## 2020-03-14 RX ADMIN — SODIUM CHLORIDE SCH MLS/HR: 9 INJECTION, SOLUTION INTRAVENOUS at 23:08

## 2020-03-14 NOTE — XRAY REPORT
Reason:  fever

Procedure Date:  03/14/2020   

Accession Number:  [id number] / [id number]                    

Procedure:  XR  - Chest 1 View X-Ray CPT Code:  33321

 

***Final Report***

 

 

FULL RESULT:

 

 

EXAM:

CHEST RADIOGRAPHY

 

EXAM DATE: 3/14/2020 09:06 PM.

 

CLINICAL HISTORY: Fever.

 

COMPARISON: CHEST 1 VIEW 06/08/2017 12:47 PM.

 

TECHNIQUE: 1 view.

 

FINDINGS:

 

LUNGS: The lungs are clear.

 

PLEURA: No significant pleural effusion. No clinically significant 

pneumothorax.

 

MEDIASTINUM: Heart and mediastinal contours are notable for aortic 

calcification. The cardiac silhouette is top normal in size. Question 

small hiatal hernia.

 

BONES: No suspicious osseous lesions.

IMPRESSION:

No acute cardiopulmonary abnormality.

 

 

[facility]

## 2020-03-14 NOTE — HISTORY & PHYSICAL EXAMINATION
Chief Complaint





- Chief Complaint


Chief Complaint: fever





History of Present Illness





- Admitted From


Admitted From:: [facility]





- History Obtained From


Records Reviewed: yes


History obtained from: wife


Exam Limitations: cognitively impaired since stroke





- History of Present Illness


HPI Comment/Other: 


Patient is a 76 y/o male who was brought to the ED by his wife because he had a 

temp of 104F at home, could not urinate and had rigors today.


He has a history of BPH. He self-caths multiple times daily. He gets fixated on 

a need to urinate and wakes up every 45 minutes at night to do so.


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


In the ED he had a temperature of 38.4 C, a WBC of 15 and a UA which was strongl

y suggestive of a UTI.


As a result he was presented for admission.


He denied chest pain, dyspnea, abd pain, nausea, vomiting, fever or chills.








History





- Past Medical History


Cardiovascular: reports: Hypertension, High cholesterol, Atrial fibrillation


Respiratory: reports: None


Endocrine/Autoimmune: reports: None


GI: reports: None


: reports: Benign prostate hypertrophy, Retention


Psych: reports: None


Musculoskeletal: reports: Chronic back pain


Derm: reports: None


MRSA Hx?: No





- Past Surgical History


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


Ortho: reports: Hip replacement (left), Knee replacement (left)


HEENT: reports: Tonsil/Adenoidectomy, Other (Carotid endarterectomy)





- Family & Social History


Family History Comment/Other: father: dementia.  at age 93.  mother: 

Diabetes mellitus and CHF


Living arrangement: At home


Living Situation: With spouse/s.o.


Social History Notes: 30+ smoking history. Quit in .  Rarely drinks alcohol.

No illicit drugs





- POLST


Patient has POLST: No


POLST Status: Full Code





Meds/Allgy





- Home Medications


Home Medications: 


                                Ambulatory Orders











 Medication  Instructions  Recorded  Confirmed


 


Dabigatran [Pradaxa] 2 tab PO BID 13


 


Finasteride 1 tab DAILY 20














- Allergies


Allergies/Adverse Reactions: 


                                    Allergies











Allergy/AdvReac Type Severity Reaction Status Date / Time


 


No Known Drug Allergies Allergy   Verified 18 10:21














Review of Systems





- Constitutional


Constitutional: reports: Fever.  denies: Fatigue, Chills





- Eyes


Eyes: denies: Pain, Vision loss





- Ears, Nose & Throat


Ears, Nose & Throat: denies: Tinnitus, Sore throat





- Cardiovascular


Cariovascular: denies: Edema, Syncope





- Respiratory


Respiratory: denies: Cough, Sputum production, Wheezing, SOB at rest, SOB with 

exertion





- Genitourinary


Genitourinary: reports: Dysuria, Frequency





- Musculoskeletal


Musculoskeletal: denies: Muscle pain, Back pain, Stiffness





- Integumentary


Integumentary: denies: Rash, Pruritis, Lesions





- Neurological


Neurological: reports: Memory problems, Abnormal gait.  denies: General 

weakness, Focal weakness, Headache





- Psychiatric


Psychiatric: denies: Depression, Anxiety





- Endocrine


Endocrine: denies: Polyuria, Polydypsia





- Hematologic/Lymphatic


Hematologic/Lymphatic: denies: Anemia, Bruising, Petechiae


Prior Level of Functionality: 


He lives at home with his wife. He has some cognitive impairment since his 

stroke. He is fixated on urinating an wakes up every 45 minutes at night to do 

so, event when told that it is not necessary. His is not very stable on his 

feet.








Exam





- Vital Signs


Vital Signs: 





                                Vital Signs x48h











  Temp Pulse Resp BP Pulse Ox


 


 20 20:20  38.4 C H  95  18  137/72 H  100














- Physical Exam


General Appearance: positive: No acute distress, Alert


Eyes Bilateral: positive: PERRL, EOMI


ENT: positive: No signs of dehydration


Neck: positive: No JVD, Trachea midline, Other (site of previous carotid 

endarterectomy noted)


Respiratory: positive: Chest non-tender, No respiratory distress, Breath sounds 

nml.  negative: Wheezes, Rales, Rhonchi


Cardiovascular: positive: Regular rate & rhythm


Abdomen: positive: Non-tender, No organomegaly, Nml bowel sounds, No distention.

  negative: Guarding, Rebound


Back: positive: Nml inspection


Skin: positive: Color nml, No rash, Warm


Extremities: positive: Non-tender, Full ROM, Nml appearance, No pedal edema


Neurologic/Psychiatric: positive: Oriented x3, Mood/affect nml


Comments/Other: 


right testicle more enlarged than the left. New cyst on the corner of left 

testicle.








Conclusion/Plan





- Problem List


(1) Urinary tract infection


Conclusion/Plan: 


Patient started on rocephin 2g daily


Will continue.


Blood and urine cultures pending


Patient recephin IV hydration with NS at 100ml/hr


Qualifiers: 


   Urinary tract infection type: site unspecified   Hematuria presence: without 

hematuria   Qualified Code(s): N39.0 - Urinary tract infection, site not 

specified   





(2) Chronic atrial fibrillation


Conclusion/Plan: 


On pradaxa


Not on any rate controlling medication








(3) BPH (benign prostatic hyperplasia)


Conclusion/Plan: 


On finasteride








- Lab Results


Fish Bones: 


                                 20 20:58





                                 20 20:58





Core Measures





- Anticipated LOS


I expect patient to be DC'd or transferred within 96 hours.: Yes





- DVT/VTE - Prophylaxis


VTE/DVT Prophylaxis med ordered at admit?: Yes

## 2020-03-14 NOTE — ED PHYSICIAN DOCUMENTATION
History of Present Illness





- Stated complaint


Stated Complaint: M , FEVER, SHAKY





- Chief complaint


Chief Complaint: Abd Pain





- History obtained from


History obtained from: Patient, Family (The patient is a 75-year-old male 

presents with his wife with a chief complaint of fever and likely urinary tract 

infection.  Patient's been self cathing himself at home.He does report fevers 

and dysuria.Does get a history of recurrent admissions for urosepsis.)





Review of Systems


Constitutional: reports: Fever, Chills


Eyes: reports: Reviewed and negative


Ears: reports: Reviewed and negative


Nose: reports: Reviewed and negative


Throat: reports: Reviewed and negative


Cardiac: reports: Reviewed and negative


Respiratory: reports: Reviewed and negative


GI: reports: Reviewed and negative


: reports: Dysuria, Frequency, Hesitancy


Skin: reports: Reviewed and negative


Musculoskeletal: reports: Reviewed and negative


Neurologic: reports: Reviewed and negative


Psychiatric: reports: Reviewed and negative


Endocrine: reports: Reviewed and negative


Immunocompromised: reports: Reviewed and negative





PD PAST MEDICAL HISTORY





- Past Medical History


Cardiovascular: Hypertension, High cholesterol, Atrial fibrillation


Respiratory: None


Endocrine/Autoimmune: None


GI: None


: Benign prostate hypertrophy, Retention


Psych: None


Musculoskeletal: Chronic back pain


Derm: None





- Past Surgical History


Past Surgical History: Yes


General: Splenectomy


Ortho: Hip replacement


HEENT: Tonsil/Adenoidectomy





- Present Medications


Home Medications: 


                                Ambulatory Orders











 Medication  Instructions  Recorded  Confirmed


 


Dabigatran [Pradaxa] 0 mg PO DAILY 09/22/13 01/21/18


 


Tamsulosin [Flomax] 0.4 mg PO BID 06/08/17 01/21/18


 


Cholecalciferol (Vitamin D3) 1,000 mg PO DAILY 09/26/17 01/21/18





[Vitamin D3]   














- Allergies


Allergies/Adverse Reactions: 


                                    Allergies











Allergy/AdvReac Type Severity Reaction Status Date / Time


 


No Known Drug Allergies Allergy   Verified 01/21/18 10:21














- Social History


Does the pt smoke?: No


Smoking Status: Never smoker


Does the pt drink ETOH?: Yes


Does the pt have substance abuse?: No





- Immunizations


Immunizations are current?: Yes





PD ED PE NORMAL





- Vitals


Vital signs reviewed: Yes





- General


General: No acute distress, Well developed/nourished





- HEENT


HEENT: Atraumatic, PERRL, Moist mucous membranes, Pharynx benign





- Neck


Neck: Supple, no meningeal sign, No JVD





- Cardiac


Cardiac: RRR, No murmur, Strong equal pulses





- Respiratory


Respiratory: No respiratory distress, Clear bilaterally





- Abdomen


Abdomen: Normal bowel sounds, Soft, Non tender, Non distended, No organomegaly





- Male 


Male : Other (circumcised, testicles descended bilaterally, no blood at the 

urethral meatus. )





- Derm


Derm: Normal color, Warm and dry, No rash





- Extremities


Extremities: No deformity, No edema





- Neuro


Neuro: Alert and oriented X 3, CNs 2-12 intact, No motor deficit, No sensory 

deficit, Normal speech





- Psych


Psych: Normal mood, Normal affect





Results





- Vitals


Vitals: 


                               Vital Signs - 24 hr











  03/14/20





  20:20


 


Temperature 38.4 C H


 


Heart Rate 95


 


Respiratory 18





Rate 


 


Blood Pressure 137/72 H


 


O2 Saturation 100








                                     Oxygen











O2 Source                      Room air

















- Labs


Labs: 


                                Laboratory Tests











  03/14/20 03/14/20 03/14/20





  20:45 20:58 20:58


 


WBC   15.1 H 


 


RBC   4.23 L 


 


Hgb   13.8 L 


 


Hct   42.4 


 


MCV   100.2 H 


 


MCH   32.6 H 


 


MCHC   32.5 


 


RDW   13.2 


 


Plt Count   239 


 


MPV   10.9 


 


Neut # (Auto)   Not Reportable 


 


Lymph # (Auto)   Not Reportable 


 


Mono # (Auto)   Not Reportable 


 


Eos # (Auto)   Not Reportable 


 


Baso # (Auto)   Not Reportable 


 


Absolute Nucleated RBC   Not Reportable 


 


Total Counted   100 


 


Band Neuts % (Manual)   0 


 


Abnorm Lymph % (Manual)   0 


 


Nucleated RBC %   Not Reportable 


 


Neutrophils # (Manual)   13.6 H 


 


Lymphocytes # (Manual)   0.8 L 


 


Monocytes # (Manual)   0.8 


 


Eosinophils # (Manual)   0.0 


 


Basophils # (Manual)   0.0 


 


Differential Comment   MANUAL DIFFERENTIAL 


 


Manual Slide Review   Indicated 


 


WBC Morphology   NORMAL APPEARANCE 


 


Platelet Estimate   NORMAL (130-450,000) 


 


Platelet Morphology   NORMAL APPEARANCE 


 


RBC Morph Micro Appear   2+ MACROCYTOSIS 


 


PT    13.7 H


 


INR    1.2


 


APTT    29.4


 


Sodium   


 


Potassium   


 


Chloride   


 


Carbon Dioxide   


 


Anion Gap   


 


BUN   


 


Creatinine   


 


Estimated GFR (MDRD)   


 


Glucose   


 


Lactic Acid   


 


Calcium   


 


Total Bilirubin   


 


AST   


 


ALT   


 


Alkaline Phosphatase   


 


Total Protein   


 


Albumin   


 


Globulin   


 


Albumin/Globulin Ratio   


 


Lipase   


 


Urine Color  YELLOW  


 


Urine Clarity  CLOUDY  


 


Urine pH  6.0  


 


Ur Specific Gravity  1.025  


 


Urine Protein  100 H  


 


Urine Glucose (UA)  NEGATIVE  


 


Urine Ketones  NEGATIVE  


 


Urine Occult Blood  SMALL H  


 


Urine Nitrite  POSITIVE H  


 


Urine Bilirubin  NEGATIVE  


 


Urine Urobilinogen  0.2 (NORMAL)  


 


Ur Leukocyte Esterase  LARGE H  


 


Urine RBC  6-10 H  


 


Urine WBC  >25 H  


 


Urine WBC Clumps  PRESENT  


 


Ur Squamous Epith Cells  NONE SEEN  


 


Urine Bacteria  Many H  


 


Ur Microscopic Review  INDICATED  


 


Urine Culture Comments  INDICATED  














  03/14/20 03/14/20





  20:58 20:58


 


WBC  


 


RBC  


 


Hgb  


 


Hct  


 


MCV  


 


MCH  


 


MCHC  


 


RDW  


 


Plt Count  


 


MPV  


 


Neut # (Auto)  


 


Lymph # (Auto)  


 


Mono # (Auto)  


 


Eos # (Auto)  


 


Baso # (Auto)  


 


Absolute Nucleated RBC  


 


Total Counted  


 


Band Neuts % (Manual)  


 


Abnorm Lymph % (Manual)  


 


Nucleated RBC %  


 


Neutrophils # (Manual)  


 


Lymphocytes # (Manual)  


 


Monocytes # (Manual)  


 


Eosinophils # (Manual)  


 


Basophils # (Manual)  


 


Differential Comment  


 


Manual Slide Review  


 


WBC Morphology  


 


Platelet Estimate  


 


Platelet Morphology  


 


RBC Morph Micro Appear  


 


PT  


 


INR  


 


APTT  


 


Sodium  134 L 


 


Potassium  3.8 


 


Chloride  101 


 


Carbon Dioxide  25 


 


Anion Gap  8.0 


 


BUN  19 


 


Creatinine  1.1 


 


Estimated GFR (MDRD)  65 L 


 


Glucose  137 H 


 


Lactic Acid   1.6


 


Calcium  8.5 


 


Total Bilirubin  0.6 


 


AST  18 


 


ALT  11 


 


Alkaline Phosphatase  42 


 


Total Protein  6.7 


 


Albumin  3.5 


 


Globulin  3.2 


 


Albumin/Globulin Ratio  1.1 


 


Lipase  35 


 


Urine Color  


 


Urine Clarity  


 


Urine pH  


 


Ur Specific Gravity  


 


Urine Protein  


 


Urine Glucose (UA)  


 


Urine Ketones  


 


Urine Occult Blood  


 


Urine Nitrite  


 


Urine Bilirubin  


 


Urine Urobilinogen  


 


Ur Leukocyte Esterase  


 


Urine RBC  


 


Urine WBC  


 


Urine WBC Clumps  


 


Ur Squamous Epith Cells  


 


Urine Bacteria  


 


Ur Microscopic Review  


 


Urine Culture Comments  














PD MEDICAL DECISION MAKING





- ED course


Complexity details: considered differential (urosepsis)





- Consults


Consults: Consulted (name) (22:13 Dr. Salguero, will accept for admission.), 

Request consultant admit patient (22:13)





- Critical Care


Time(min): 30


Time Includes: Direct patient care, Review records, Reassess patient, Document 

care, Coordinate care, Medical consult, Family consult for tx dec


Data interpretation: Labs, Pulse ox, CXR


Procedures included in critical care time: Peripheral IV


Procedures excluded from critical care time: EKG





Departure





- Departure


Disposition: [facility] DC/Xfer


Clinical Impression: 


UTI (urinary tract infection)


Qualifiers:


 Urinary tract infection type: site unspecified Hematuria presence: without 

hematuria Qualified Code(s): N39.0 - Urinary tract infection, site not specified





Condition: Stable

## 2020-03-15 LAB
ANION GAP SERPL CALCULATED.4IONS-SCNC: 10 MMOL/L (ref 6–13)
BASOPHILS # BLD MANUAL: 0 10^3/UL (ref 0–0.1)
BASOPHILS NFR BLD AUTO: 0.5 %
BUN SERPL-MCNC: 14 MG/DL (ref 6–20)
CALCIUM UR-MCNC: 8.2 MG/DL (ref 8.5–10.3)
CHLORIDE SERPL-SCNC: 102 MMOL/L (ref 101–111)
CO2 SERPL-SCNC: 26 MMOL/L (ref 21–32)
CREAT SERPLBLD-SCNC: 1 MG/DL (ref 0.6–1.2)
EOSINOPHIL # BLD MANUAL: 0 10^3/UL (ref 0–0.7)
EOSINOPHIL NFR BLD AUTO: 0.1 %
ERYTHROCYTE [DISTWIDTH] IN BLOOD BY AUTOMATED COUNT: 13.1 % (ref 12–15)
GFRSERPLBLD MDRD-ARVRAT: 72 ML/MIN/{1.73_M2} (ref 89–?)
GLUCOSE SERPL-MCNC: 115 MG/DL (ref 70–100)
HGB UR QL STRIP: 12.9 G/DL (ref 14–18)
LYMPH ABN NFR BLD MANUAL: 0 %
LYMPHOBLASTS # BLD: 12 %
LYMPHOCYTES # BLD MANUAL: 2.1 10^3/UL (ref 1.5–3.5)
LYMPHOCYTES NFR BLD AUTO: 8.6 %
MANUAL DIF COMMENT BLD-IMP: (no result)
MCH RBC QN AUTO: 32.5 PG (ref 27–31)
MCHC RBC AUTO-ENTMCNC: 32.4 G/DL (ref 32–36)
MCV RBC AUTO: 100.3 FL (ref 80–94)
MONOCYTES # BLD MANUAL: 1.9 10^3/UL (ref 0–1)
MONOCYTES NFR BLD AUTO: 13.5 %
NEUTROPHILS # SNV AUTO: 17.6 X10^3/UL (ref 4.8–10.8)
NEUTROPHILS NFR BLD AUTO: 76.7 %
NEUTS BAND NFR BLD: 0 %
PDW BLD AUTO: 11.4 FL (ref 7.4–11.4)
PLAT MORPH BLD: (no result)
PLATELET # BLD: 222 10^3/UL (ref 130–450)
PLATELET BLD QL SMEAR: (no result)
RBC MAR: 3.97 10^6/UL (ref 4.7–6.1)
RBC MORPH BLD: (no result)
SODIUM SERPLBLD-SCNC: 138 MMOL/L (ref 135–145)

## 2020-03-15 RX ADMIN — DABIGATRAN ETEXILATE MESYLATE SCH MG: 75 CAPSULE ORAL at 21:00

## 2020-03-15 RX ADMIN — SODIUM CHLORIDE SCH MLS/HR: 900 INJECTION INTRAVENOUS at 16:56

## 2020-03-15 RX ADMIN — CALCIUM CARBONATE (ANTACID) CHEW TAB 500 MG SCH MG: 500 CHEW TAB at 22:10

## 2020-03-15 RX ADMIN — ACETAMINOPHEN PRN MG: 325 TABLET ORAL at 04:04

## 2020-03-15 RX ADMIN — SODIUM CHLORIDE, PRESERVATIVE FREE SCH ML: 5 INJECTION INTRAVENOUS at 00:59

## 2020-03-15 RX ADMIN — PANTOPRAZOLE SODIUM SCH MG: 40 TABLET, DELAYED RELEASE ORAL at 06:42

## 2020-03-15 RX ADMIN — ACETAMINOPHEN PRN MG: 325 TABLET ORAL at 12:52

## 2020-03-15 RX ADMIN — DABIGATRAN ETEXILATE MESYLATE SCH: 75 CAPSULE ORAL at 12:14

## 2020-03-15 RX ADMIN — SODIUM CHLORIDE SCH MLS/HR: 9 INJECTION, SOLUTION INTRAVENOUS at 08:49

## 2020-03-15 RX ADMIN — SODIUM CHLORIDE SCH MLS/HR: 9 INJECTION, SOLUTION INTRAVENOUS at 19:56

## 2020-03-15 RX ADMIN — ACETAMINOPHEN PRN MG: 325 TABLET ORAL at 21:00

## 2020-03-15 RX ADMIN — SODIUM CHLORIDE SCH MLS/HR: 900 INJECTION INTRAVENOUS at 23:54

## 2020-03-15 RX ADMIN — SODIUM CHLORIDE, PRESERVATIVE FREE SCH: 5 INJECTION INTRAVENOUS at 08:51

## 2020-03-15 RX ADMIN — SODIUM CHLORIDE, PRESERVATIVE FREE SCH: 5 INJECTION INTRAVENOUS at 16:59

## 2020-03-15 RX ADMIN — SODIUM CHLORIDE SCH MLS/HR: 900 INJECTION INTRAVENOUS at 08:49

## 2020-03-15 NOTE — PROVIDER PROGRESS NOTE
Subjective





- Prog Note Date


Prog Note Date: 03/15/20


Prog Note Time: 07:22





- Subjective


Pt reports feeling: Improved


Subjective: 


Joss has been tired for much of the AM. He has perked up for lunch and ate some 

of his sandwich. He remains confused, poor historian, but appears comfortable. 

His wife, Sarai is at the bedside. Her medical questions were addressed, and 

advanced care planning discussion took place concluding that Palliative care 

would be very beneficial. A Palliative care consult has been made, with 

tentative plans to meet with Negra Calhoun on Monday around 12 noon.  Sarai was 

happy with the medical plan. 





Current Medications





- Current Medications


Current Medications: 


Active Medications:  Acetaminophen (Tylenol)  650 mg PO Q6HR PRN


Sodium Chloride (Normal Saline 0.9%)  1,000 mls @ 100 mls/hr IV .Q10H SEMAJ


Meropenem 1 gm/ Sodium (Chloride)  100 mls @ 200 mls/hr IV Q8H SEMAJ


Pantoprazole Sodium (Protonix)  40 mg PO QDAC SEMAJ





HOME meds: Dabigatran [Pradaxa] 2 tab PO BID 09/22/13 


Finasteride 1 tab DAILY 03/14/20 





Objective





- Vital Signs/Intake & Output


Reviewed Vital Signs: Yes


Vital Signs: 


                                Vital Signs x48h











  Temp Pulse Resp BP Pulse Ox


 


 03/15/20 06:41  37.3 C    


 


 03/15/20 04:45  38.0 C H    


 


 03/15/20 03:57  38.7 C H  95  17  124/57 L  96


 


 03/15/20 00:05  37.1 C    











Intake & Output: 


                                 Intake & Output











 03/12/20 03/13/20 03/14/20 03/15/20





 23:59 23:59 23:59 23:59


 


Intake Total   1100 300


 


Output Total    1775


 


Balance   1100 -1475














- Objective


General Appearance: positive: No acute distress, Alert, Lethargic


Eyes Bilateral: positive: No lid inflammation


ENT: positive: Pharyngeal erythema, Dry mucous membranes


Neck: positive: Thyroid nml, No JVD, Trachea midline, Stiff neck, Other (scar to

 right neck)


Respiratory: positive: Chest non-tender, No respiratory distress, Breath sounds 

nml, Other (diminished, bilaterally)


Cardiovascular: positive: Irregularly irregular, Tachycardia, Systolic murmur, 

Decreased pulse(s)


Peripheral Pulses: 1+ Radial (R), 1+ Radial (L)


Abdomen: positive: Non-tender, Nml bowel sounds


Back: positive: Nml inspection


Skin: positive: Color nml, No rash, Warm, Dry, Other (slightly bronze toned)


Extremities: positive: Non-tender, Nml appearance, No pedal edema, Joint 

swelling


Neurologic/Psychiatric: positive: CN's nml (2-12), Disoriented to time, 

Weakness, Sensory loss, Facial droop (right facial droop, baseline per wife), 

Slurred/abnml speech (Difficulty with full sentances today, garbled speech at 

times Unable to obtain reliable review of systems), Depressed mood/affect (flat)


Reflexes: Bicep (R): 2+, Bicep (L): 2+ (patient is left hand dominent)





- Lab Results


Fish Bones: 


                                 03/15/20 05:00





                                 03/15/20 05:00


Other Labs: 


                               Lab Results x24hrs











  03/15/20 03/15/20 03/14/20 Range/Units





  05:00 05:00 20:58 


 


WBC   17.6 H   (4.8-10.8)  x10^3/uL


 


RBC   3.97 L   (4.70-6.10)  10^6/uL


 


Hgb   12.9 L   (14.0-18.0)  g/dL


 


Hct   39.8 L   (42.0-52.0)  %


 


MCV   100.3 H   (80.0-94.0)  fL


 


MCH   32.5 H   (27.0-31.0)  pg


 


MCHC   32.4   (32.0-36.0)  g/dL


 


RDW   13.1   (12.0-15.0)  %


 


Plt Count   222   (130-450)  10^3/uL


 


MPV   11.4   (7.4-11.4)  fL


 


Neut # (Auto)   Not Reportable   


 


Lymph # (Auto)   Not Reportable   


 


Mono # (Auto)   Not Reportable   


 


Eos # (Auto)   Not Reportable   


 


Baso # (Auto)   Not Reportable   


 


Absolute Nucleated RBC   Not Reportable   


 


Total Counted   100   


 


Band Neuts % (Manual)   0  (0 - 10) %


 


Abnorm Lymph % (Manual)   0   %


 


Nucleated RBC %   Not Reportable   


 


Neutrophils # (Manual)   13.6 H   (1.5-6.6)  10^3/uL


 


Lymphocytes # (Manual)   2.1   (1.5-3.5)  10^3/uL


 


Monocytes # (Manual)   1.9 H   (0.0-1.0)  10^3/uL


 


Eosinophils # (Manual)   0.0   (0-0.7)  10^3/uL


 


Basophils # (Manual)   0.0   (0-0.1)  10^3/uL


 


Differential Comment   MANUAL DIFFERENTIAL   


 


Manual Slide Review     


 


WBC Morphology   NORMAL APPEARANCE   (NORMAL)  


 


Platelet Estimate   NORMAL (130-450,000)   (NORMAL)  


 


Platelet Morphology   NORMAL APPEARANCE   (NORMAL)  


 


RBC Morph Micro Appear   NORMAL APPEARANCE   (NORMAL)  


 


PT     (9.9-12.6)  secs


 


INR     (0.8-1.2)  


 


APTT     (24.9-33.3)  secs


 


Sodium  138    (135-145)  mmol/L


 


Potassium  3.9    (3.5-5.0)  mmol/L


 


Chloride  102    (101-111)  mmol/L


 


Carbon Dioxide  26    (21-32)  mmol/L


 


Anion Gap  10.0    (6-13)  


 


BUN  14    (6-20)  mg/dL


 


Creatinine  1.0    (0.6-1.2)  mg/dL


 


Estimated GFR (MDRD)  72 L    (>89)  


 


Glucose  115 H    ()  mg/dL


 


Lactic Acid    1.6  (0.5-2.2)  mmol/L


 


Calcium  8.2 L    (8.5-10.3)  mg/dL


 


Total Bilirubin     (0.2-1.0)  mg/dL


 


AST     (10-42)  IU/L


 


ALT     (10-60)  IU/L


 


Alkaline Phosphatase     ()  IU/L


 


Total Protein     (6.7-8.2)  g/dL


 


Albumin     (3.2-5.5)  g/dL


 


Globulin     (2.1-4.2)  g/dL


 


Albumin/Globulin Ratio     (1.0-2.2)  


 


Lipase     (22-51)  U/L


 


Urine Color     


 


Urine Clarity     (CLEAR)  


 


Urine pH     (5.0-7.5)  PH


 


Ur Specific Gravity     (1.002-1.030)  


 


Urine Protein     (NEGATIVE)  mg/dL


 


Urine Glucose (UA)     (NEGATIVE)  mg/dL


 


Urine Ketones     (NEGATIVE)  mg/dL


 


Urine Occult Blood     (NEGATIVE)  


 


Urine Nitrite     (NEGATIVE)  


 


Urine Bilirubin     (NEGATIVE)  


 


Urine Urobilinogen     (NORMAL)  E.U./dL


 


Ur Leukocyte Esterase     (NEGATIVE)  


 


Urine RBC     (0-5)  /HPF


 


Urine WBC     (0-3)  /HPF


 


Urine WBC Clumps     


 


Ur Squamous Epith Cells     (<= Few)  


 


Urine Bacteria     (None Seen)  /HPF


 


Ur Microscopic Review     


 


Urine Culture Comments     














  03/14/20 03/14/20 03/14/20 Range/Units





  20:58 20:58 20:58 


 


WBC    15.1 H  (4.8-10.8)  x10^3/uL


 


RBC    4.23 L  (4.70-6.10)  10^6/uL


 


Hgb    13.8 L  (14.0-18.0)  g/dL


 


Hct    42.4  (42.0-52.0)  %


 


MCV    100.2 H  (80.0-94.0)  fL


 


MCH    32.6 H  (27.0-31.0)  pg


 


MCHC    32.5  (32.0-36.0)  g/dL


 


RDW    13.2  (12.0-15.0)  %


 


Plt Count    239  (130-450)  10^3/uL


 


MPV    10.9  (7.4-11.4)  fL


 


Neut # (Auto)    Not Reportable  


 


Lymph # (Auto)    Not Reportable  


 


Mono # (Auto)    Not Reportable  


 


Eos # (Auto)    Not Reportable  


 


Baso # (Auto)    Not Reportable  


 


Absolute Nucleated RBC    Not Reportable  


 


Total Counted    100  


 


Band Neuts % (Manual)    0 (0 - 10) %


 


Abnorm Lymph % (Manual)    0  %


 


Nucleated RBC %    Not Reportable  


 


Neutrophils # (Manual)    13.6 H  (1.5-6.6)  10^3/uL


 


Lymphocytes # (Manual)    0.8 L  (1.5-3.5)  10^3/uL


 


Monocytes # (Manual)    0.8  (0.0-1.0)  10^3/uL


 


Eosinophils # (Manual)    0.0  (0-0.7)  10^3/uL


 


Basophils # (Manual)    0.0  (0-0.1)  10^3/uL


 


Differential Comment    MANUAL DIFFERENTIAL  


 


Manual Slide Review    Indicated  


 


WBC Morphology    NORMAL APPEARANCE  (NORMAL)  


 


Platelet Estimate    NORMAL (130-450,000)  (NORMAL)  


 


Platelet Morphology    NORMAL APPEARANCE  (NORMAL)  


 


RBC Morph Micro Appear    2+ MACROCYTOSIS  (NORMAL)  


 


PT   13.7 H   (9.9-12.6)  secs


 


INR   1.2   (0.8-1.2)  


 


APTT   29.4   (24.9-33.3)  secs


 


Sodium  134 L    (135-145)  mmol/L


 


Potassium  3.8    (3.5-5.0)  mmol/L


 


Chloride  101    (101-111)  mmol/L


 


Carbon Dioxide  25    (21-32)  mmol/L


 


Anion Gap  8.0    (6-13)  


 


BUN  19    (6-20)  mg/dL


 


Creatinine  1.1    (0.6-1.2)  mg/dL


 


Estimated GFR (MDRD)  65 L    (>89)  


 


Glucose  137 H    ()  mg/dL


 


Lactic Acid     (0.5-2.2)  mmol/L


 


Calcium  8.5    (8.5-10.3)  mg/dL


 


Total Bilirubin  0.6    (0.2-1.0)  mg/dL


 


AST  18    (10-42)  IU/L


 


ALT  11    (10-60)  IU/L


 


Alkaline Phosphatase  42    ()  IU/L


 


Total Protein  6.7    (6.7-8.2)  g/dL


 


Albumin  3.5    (3.2-5.5)  g/dL


 


Globulin  3.2    (2.1-4.2)  g/dL


 


Albumin/Globulin Ratio  1.1    (1.0-2.2)  


 


Lipase  35    (22-51)  U/L


 


Urine Color     


 


Urine Clarity     (CLEAR)  


 


Urine pH     (5.0-7.5)  PH


 


Ur Specific Gravity     (1.002-1.030)  


 


Urine Protein     (NEGATIVE)  mg/dL


 


Urine Glucose (UA)     (NEGATIVE)  mg/dL


 


Urine Ketones     (NEGATIVE)  mg/dL


 


Urine Occult Blood     (NEGATIVE)  


 


Urine Nitrite     (NEGATIVE)  


 


Urine Bilirubin     (NEGATIVE)  


 


Urine Urobilinogen     (NORMAL)  E.U./dL


 


Ur Leukocyte Esterase     (NEGATIVE)  


 


Urine RBC     (0-5)  /HPF


 


Urine WBC     (0-3)  /HPF


 


Urine WBC Clumps     


 


Ur Squamous Epith Cells     (<= Few)  


 


Urine Bacteria     (None Seen)  /HPF


 


Ur Microscopic Review     


 


Urine Culture Comments     














  03/14/20 Range/Units





  20:45 


 


WBC   (4.8-10.8)  x10^3/uL


 


RBC   (4.70-6.10)  10^6/uL


 


Hgb   (14.0-18.0)  g/dL


 


Hct   (42.0-52.0)  %


 


MCV   (80.0-94.0)  fL


 


MCH   (27.0-31.0)  pg


 


MCHC   (32.0-36.0)  g/dL


 


RDW   (12.0-15.0)  %


 


Plt Count   (130-450)  10^3/uL


 


MPV   (7.4-11.4)  fL


 


Neut # (Auto)   


 


Lymph # (Auto)   


 


Mono # (Auto)   


 


Eos # (Auto)   


 


Baso # (Auto)   


 


Absolute Nucleated RBC   


 


Total Counted   


 


Band Neuts % (Manual)  (0 - 10) %


 


Abnorm Lymph % (Manual)   %


 


Nucleated RBC %   


 


Neutrophils # (Manual)   (1.5-6.6)  10^3/uL


 


Lymphocytes # (Manual)   (1.5-3.5)  10^3/uL


 


Monocytes # (Manual)   (0.0-1.0)  10^3/uL


 


Eosinophils # (Manual)   (0-0.7)  10^3/uL


 


Basophils # (Manual)   (0-0.1)  10^3/uL


 


Differential Comment   


 


Manual Slide Review   


 


WBC Morphology   (NORMAL)  


 


Platelet Estimate   (NORMAL)  


 


Platelet Morphology   (NORMAL)  


 


RBC Morph Micro Appear   (NORMAL)  


 


PT   (9.9-12.6)  secs


 


INR   (0.8-1.2)  


 


APTT   (24.9-33.3)  secs


 


Sodium   (135-145)  mmol/L


 


Potassium   (3.5-5.0)  mmol/L


 


Chloride   (101-111)  mmol/L


 


Carbon Dioxide   (21-32)  mmol/L


 


Anion Gap   (6-13)  


 


BUN   (6-20)  mg/dL


 


Creatinine   (0.6-1.2)  mg/dL


 


Estimated GFR (MDRD)   (>89)  


 


Glucose   ()  mg/dL


 


Lactic Acid   (0.5-2.2)  mmol/L


 


Calcium   (8.5-10.3)  mg/dL


 


Total Bilirubin   (0.2-1.0)  mg/dL


 


AST   (10-42)  IU/L


 


ALT   (10-60)  IU/L


 


Alkaline Phosphatase   ()  IU/L


 


Total Protein   (6.7-8.2)  g/dL


 


Albumin   (3.2-5.5)  g/dL


 


Globulin   (2.1-4.2)  g/dL


 


Albumin/Globulin Ratio   (1.0-2.2)  


 


Lipase   (22-51)  U/L


 


Urine Color  YELLOW  


 


Urine Clarity  CLOUDY  (CLEAR)  


 


Urine pH  6.0  (5.0-7.5)  PH


 


Ur Specific Gravity  1.025  (1.002-1.030)  


 


Urine Protein  100 H  (NEGATIVE)  mg/dL


 


Urine Glucose (UA)  NEGATIVE  (NEGATIVE)  mg/dL


 


Urine Ketones  NEGATIVE  (NEGATIVE)  mg/dL


 


Urine Occult Blood  SMALL H  (NEGATIVE)  


 


Urine Nitrite  POSITIVE H  (NEGATIVE)  


 


Urine Bilirubin  NEGATIVE  (NEGATIVE)  


 


Urine Urobilinogen  0.2 (NORMAL)  (NORMAL)  E.U./dL


 


Ur Leukocyte Esterase  LARGE H  (NEGATIVE)  


 


Urine RBC  6-10 H  (0-5)  /HPF


 


Urine WBC  >25 H  (0-3)  /HPF


 


Urine WBC Clumps  PRESENT  


 


Ur Squamous Epith Cells  NONE SEEN  (<= Few)  


 


Urine Bacteria  Many H  (None Seen)  /HPF


 


Ur Microscopic Review  INDICATED  


 


Urine Culture Comments  INDICATED  














- Diagnostic Imaging


Diagnostic Imaging Results: positive: Final report reviewed


Diagnostic Imaging Comments: 


EXAM: CHEST RADIOGRAPHY  3/14/2020 09:06 PM


IMPRESSION: No acute cardiopulmonary abnormality. 





ABX Reporting


Has patient been on IV antibiotics over the past 48 hours?: Yes





Sepsis Event Note (H)





- Evaluation


Current Stage of Sepsis: Sepsis


Possible source of Sepsis: positive: Genitourinary


Confirmed Source and Organism (if known) of Sepsis: 





e coli





- Sepsis Criteria


Sepsis Criteria: Recorded Temperature greater than 38.3C or Less than 36C, 

Recorded Heart Rate greater than 90 bpm, CNS: altered consciousness (unrelated 

to primary neuro pathology)





Assessment/Plan





- Problem List


(1) Bacteremia due to Escherichia coli


Impression: 


-1 of 2 blood cultures show e. coli


-Rechecking BC x2 in the AM


-Continues on Meropenem IV


-Checking an echocardiogram in the AM to evaluate for endocarditis, valve 

vegetation


-Continue gentle IV fluids, IV antibiotics, await final culture results





Pyelonephritis


-Initially treated with IV Rocephin, changed to Meropenem due to +blood cultures

 and severity of illness


-Kidney US showed no hydronephrosis, bilateral simple renal cysts


-Multiple, repeated home straight caths prior to coming to the ED beginning at 

least ~6 months ago


-Acute on chronic back pain per patients wife


-Diarrhea which started yesterday


-Profound worsening of his baseline confusion


-Await final sensitivities, indwelling bueno to remain in place





Acute metabolic encephalopathy


-Patients wife reported that the patient took off for a walk by himself, which 

was out of character for him yesterday before coming down with a fever leading 

to this admission


-Patient has baseline dementia with sundowning at home


-Profoundly confused throughout the night, requiring sedation


-Likely a result of this illness, qualifier for complicated UTI (pyelonephritis)


-Continue to treat illness, treat with low dose Ativan PO if needed 





Fever


-Temp max was 38.7 C soon after arriving to the nursing floor, continues today 

at 1251PM, with a temp of 38.0 C


-Treating infection, Tylenol for symptoms





Chronic atrial fibrillation


-Likely the contributing factor with the patients history of CVA


-Continues on pradaxa BID, wife was instructed to take pills home


-No other medications for rate control on home med list


-Not on any rate controlling medication


-First diagnosed just prior to his stroke, but there was a delay with starting 

anticoagulation at the time


-Continues on telemetry, rates 90-110s, likely due to illness


-Await echo in the AM





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


-On finasteride at home, recommend stopping since indwelling bueno will be kept 

in place


-Given the patients baseline dementia and short term memory loss, the straight 

cath routine at home may be more risk than beneficial at home


-Palliative care conference in the AM





Moderate dementia with behavioral disturbance


-Patient has progressively become worse in the past 6 months with absolutely no 

memory per patients wife


-Status post CVA with cognitive deficit residual


-Palliative care consult on Monday ~ 12noon (pending approval from provider) to 

discuss goals of care, and ensure all needs are met for the patient to remain at

 home





COPD with emphysema


-Patient smoked for greater than 30 years in total, stopped in the year 1997


-Consequently had carotid artery occlusion, CVA, HTN, BNP (vascular 

complications)


-No recent pneumonia, no cough


-No home inhalers, not needed at this point


-Await echo in the AM
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
## 2020-03-15 NOTE — ULTRASOUND REPORT
Reason:  back pain, UTI, confusion

Procedure Date:  03/15/2020   

Accession Number:  [id number] / [id number]                    

Procedure:  US  - Retroperitoneal CPT Code:  

 

***Final Report***

 

 

FULL RESULT:

 

 

EXAM:

RENAL ULTRASOUND

 

EXAM DATE: 3/15/2020 09:07 AM.

 

CLINICAL HISTORY: Back pain, UTI, confusion.

 

COMPARISON: BLADDER ultrasound 2017 3:29 PM.

 

TECHNIQUE: Real-time scanning was performed with static images obtained.

 

FINDINGS:

Right Kidney: 11.6 x 5.7 x 6.0 cm. Normal echotexture with no stones, 

contour-deforming masses, or hydronephrosis. There are multiple simple 

renal cortical cysts includin. Inferior pole cyst measuring 0.8 x 0.6 cm.

2. Inferior pole cyst measuring 1.0 x 0.8 x 0.9 cm.

3. Superior lateral cyst measuring 1.5 x 1.6 x 1.9 cm.

 

Left Kidney: 11.6 x 5.3 x 5.5 cm. Normal echotexture with no stones, 

contour-deforming masses, or hydronephrosis. There are multiple simple 

renal cortical cysts includin. Medial interpolar region cyst measuring 0.7 x 0.5 x 0.9 cm.

2. Lateral interpolar region cyst measuring 1.1 x 0.7 x 1.3 cm.

3. Superior lateral cyst measuring 1.7 x 1.4 x 1.2 cm.

 

Bladder: Decompressed by a Pineda catheter, limiting evaluation.

 

Other: None.

IMPRESSION:

1. No hydronephrosis bilaterally.

2. There are multiple bilateral small simple renal cortical cysts.

3. The bladder is decompressed by a Pineda catheter.

 

[facility]

## 2020-03-15 NOTE — PHARMACY PROGRESS NOTE
- Best Possible Medication History


Admit Date and Time: 03/14/20 2212


Processed by: Pharmacy


Medication History completed: Yes


Patient Interview: Completed


Secondary Source(s): Prescription bottles, Spouse/Significant other





As the person ultimately responsible for medication therapy, providers are able 

to order a medication from an existing home medication list in [facility] via the 

"Reconcile Routine" prior to Confirmation of that medication by support staff. 

Such practice is discouraged except when the physician, in their clinical 

judgment, deems that a medical need exists for a medication without regard to 

previous use.

## 2020-03-16 LAB
ANION GAP SERPL CALCULATED.4IONS-SCNC: 8 MMOL/L (ref 6–13)
BASOPHILS NFR BLD AUTO: 0.1 10^3/UL (ref 0–0.1)
BASOPHILS NFR BLD AUTO: 0.5 %
BUN SERPL-MCNC: 13 MG/DL (ref 6–20)
CALCIUM UR-MCNC: 8.5 MG/DL (ref 8.5–10.3)
CHLORIDE SERPL-SCNC: 105 MMOL/L (ref 101–111)
CO2 SERPL-SCNC: 26 MMOL/L (ref 21–32)
CREAT SERPLBLD-SCNC: 0.9 MG/DL (ref 0.6–1.2)
EOSINOPHIL # BLD AUTO: 0.1 10^3/UL (ref 0–0.7)
EOSINOPHIL NFR BLD AUTO: 0.6 %
ERYTHROCYTE [DISTWIDTH] IN BLOOD BY AUTOMATED COUNT: 13.5 % (ref 12–15)
GFRSERPLBLD MDRD-ARVRAT: 82 ML/MIN/{1.73_M2} (ref 89–?)
GLUCOSE SERPL-MCNC: 94 MG/DL (ref 70–100)
HGB UR QL STRIP: 13.5 G/DL (ref 14–18)
LYMPHOCYTES # SPEC AUTO: 2.7 10^3/UL (ref 1.5–3.5)
LYMPHOCYTES NFR BLD AUTO: 14.6 %
MCH RBC QN AUTO: 31.8 PG (ref 27–31)
MCHC RBC AUTO-ENTMCNC: 31.9 G/DL (ref 32–36)
MCV RBC AUTO: 99.8 FL (ref 80–94)
MONOCYTES # BLD AUTO: 1.9 10^3/UL (ref 0–1)
MONOCYTES NFR BLD AUTO: 10.2 %
NEUTROPHILS # BLD AUTO: 13.3 10^3/UL (ref 1.5–6.6)
NEUTROPHILS # SNV AUTO: 18.2 X10^3/UL (ref 4.8–10.8)
NEUTROPHILS NFR BLD AUTO: 73.4 %
PDW BLD AUTO: 12 FL (ref 7.4–11.4)
PLATELET # BLD: 211 10^3/UL (ref 130–450)
RBC MAR: 4.24 10^6/UL (ref 4.7–6.1)
SODIUM SERPLBLD-SCNC: 139 MMOL/L (ref 135–145)

## 2020-03-16 RX ADMIN — SODIUM CHLORIDE, PRESERVATIVE FREE SCH: 5 INJECTION INTRAVENOUS at 09:01

## 2020-03-16 RX ADMIN — SODIUM CHLORIDE SCH MLS/HR: 900 INJECTION INTRAVENOUS at 16:04

## 2020-03-16 RX ADMIN — CALCIUM CARBONATE (ANTACID) CHEW TAB 500 MG SCH: 500 CHEW TAB at 14:08

## 2020-03-16 RX ADMIN — PANTOPRAZOLE SODIUM SCH MG: 40 TABLET, DELAYED RELEASE ORAL at 05:57

## 2020-03-16 RX ADMIN — SODIUM CHLORIDE, PRESERVATIVE FREE SCH ML: 5 INJECTION INTRAVENOUS at 16:05

## 2020-03-16 RX ADMIN — CALCIUM CARBONATE (ANTACID) CHEW TAB 500 MG SCH MG: 500 CHEW TAB at 05:56

## 2020-03-16 RX ADMIN — SODIUM CHLORIDE, PRESERVATIVE FREE SCH: 5 INJECTION INTRAVENOUS at 01:00

## 2020-03-16 RX ADMIN — DABIGATRAN ETEXILATE MESYLATE SCH MG: 75 CAPSULE ORAL at 21:26

## 2020-03-16 RX ADMIN — DOCUSATE SODIUM SCH MG: 250 CAPSULE, LIQUID FILLED ORAL at 09:01

## 2020-03-16 RX ADMIN — SODIUM CHLORIDE SCH MLS/HR: 9 INJECTION, SOLUTION INTRAVENOUS at 05:56

## 2020-03-16 RX ADMIN — SODIUM CHLORIDE SCH MLS/HR: 900 INJECTION INTRAVENOUS at 08:58

## 2020-03-16 RX ADMIN — DABIGATRAN ETEXILATE MESYLATE SCH MG: 75 CAPSULE ORAL at 09:37

## 2020-03-16 RX ADMIN — CALCIUM CARBONATE (ANTACID) CHEW TAB 500 MG SCH: 500 CHEW TAB at 21:26

## 2020-03-16 NOTE — PROVIDER PROGRESS NOTE
Subjective





- Prog Note Date


Prog Note Date: 03/16/20


Prog Note Time: 13:45





- Subjective


Pt reports feeling: Improved


Subjective: 


Joss has no complaints and has no pain this morning. He has less chills today 

and is enjoying the food here. The Palliative care conference was beneficial to 

the patient and his wife in future planning and in reviewing goals of care.





Current Medications





- Current Medications


Current Medications: 


Active Medications:  Acetaminophen (Tylenol)  650 mg PO Q6HR PRN


Calcium Carbonate/Glycine (Tums)  500 mg PO TID [facility]


Dabigatran (Pradaxa)  150 mg PO BID [facility]


Docusate Sodium (Colace 250mg Capsule)  250 - 500 mg PO DAILY [facility]


Meropenem 1 gm/ Sodium (Chloride)  100 mls @ 200 mls/hr IV Q8H [facility]


Lorazepam (Ativan)  0.5 mg PO QPM PRN


Pantoprazole Sodium (Protonix)  40 mg PO QDAC [facility]


Polyethylene Glycol (Miralax)  17 gm PO DAILY [facility]





HOME meds: Dabigatran [Pradaxa] 150 mg PO BID 09/22/13 


Finasteride 5 mg PO DAILY 03/14/20 





Objective





- Vital Signs/Intake & Output


Reviewed Vital Signs: Yes


Vital Signs: 


                                Vital Signs x48h











  Temp Pulse Resp BP Pulse Ox


 


 03/16/20 08:50  37.4 C  89  18  95/61  96











Intake & Output: 


                                 Intake & Output











 03/13/20 03/14/20 03/15/20 03/16/20





 23:59 23:59 23:59 23:59


 


Intake Total  1100 3468.333 1153.333


 


Output Total   2800 1250


 


Balance  1100 668.333 -96.667














- Objective


General Appearance: positive: No acute distress, Alert


Eyes Bilateral: positive: PERRL, No lid inflammation


ENT: positive: Pharynx nml, No signs of dehydration


Neck: positive: Thyroid nml, No JVD


Respiratory: positive: Chest non-tender, No respiratory distress, Breath sounds 

nml


Cardiovascular: positive: Regular rate & rhythm, No gallop, Systolic murmur, 

Decreased pulse(s)


Peripheral Pulses: 1+ Radial (R), 1+ Radial (L)


Abdomen: positive: Non-tender, Nml bowel sounds


Back: positive: Nml inspection


Skin: positive: No rash, Warm, Dry, Other (bronze toned skin)


Extremities: positive: Non-tender, Full ROM, Nml appearance, No pedal edema


Neurologic/Psychiatric: positive: CN's nml (2-12), Motor nml, Sensation nml, 

Weakness, Slurred/abnml speech, Depressed mood/affect (flat), Other (baseline 

dementia)


Reflexes: Bicep (R): 2+, Bicep (L): 2+





- Lab Results


Fish Bones: 


                                 03/16/20 05:00





                                 03/16/20 05:00


Other Labs: 


                               Lab Results x24hrs











  03/16/20 03/16/20 03/16/20 Range/Units





  05:00 05:00 05:00 


 


WBC    18.2 H  (4.8-10.8)  x10^3/uL


 


RBC    4.24 L  (4.70-6.10)  10^6/uL


 


Hgb    13.5 L  (14.0-18.0)  g/dL


 


Hct    42.3  (42.0-52.0)  %


 


MCV    99.8 H  (80.0-94.0)  fL


 


MCH    31.8 H  (27.0-31.0)  pg


 


MCHC    31.9 L  (32.0-36.0)  g/dL


 


RDW    13.5  (12.0-15.0)  %


 


Plt Count    211  (130-450)  10^3/uL


 


MPV    12.0 H  (7.4-11.4)  fL


 


Neut # (Auto)    13.3 H  (1.5-6.6)  10^3/uL


 


Lymph # (Auto)    2.7  (1.5-3.5)  10^3/uL


 


Mono # (Auto)    1.9 H  (0.0-1.0)  10^3/uL


 


Eos # (Auto)    0.1  (0.0-0.7)  10^3/uL


 


Baso # (Auto)    0.1  (0.0-0.1)  10^3/uL


 


Absolute Nucleated RBC    0.00  x10^3/uL


 


Nucleated RBC %    0.0  /100WBC


 


Sodium   139   (135-145)  mmol/L


 


Potassium   4.0   (3.5-5.0)  mmol/L


 


Chloride   105   (101-111)  mmol/L


 


Carbon Dioxide   26   (21-32)  mmol/L


 


Anion Gap   8.0   (6-13)  


 


BUN   13   (6-20)  mg/dL


 


Creatinine   0.9   (0.6-1.2)  mg/dL


 


Estimated GFR (MDRD)   82 L   (>89)  


 


Glucose   94   ()  mg/dL


 


Calcium   8.5   (8.5-10.3)  mg/dL


 


TSH  0.80    (0.34-5.60)  uIU/mL














ABX Reporting


Has patient been on IV antibiotics over the past 48 hours?: Yes





Sepsis Event Note (H)





- Evaluation


Current Stage of Sepsis: Sepsis


Possible source of Sepsis: positive: Genitourinary





- Sepsis Criteria


Sepsis Criteria: Recorded Temperature greater than 38.3C or Less than 36C, 

Recorded Heart Rate greater than 90 bpm, CNS: altered consciousness (unrelated 

to primary neuro pathology)





Assessment/Plan





- Problem List


(1) Bacteremia due to Escherichia coli


Impression: 


-1 of 2 blood cultures show e. coli


-Repeat BC x2 are pending


-Continues on Meropenem IV


-Echocardiogram has been obtained to evaluate for endocarditis, valve vegetation


-Continue IV antibiotics, await final culture results





Pyelonephritis


-Initially treated with IV Rocephin, changed to Meropenem due to +blood cultures

 and severity of illness


-Kidney US showed no hydronephrosis, bilateral simple renal cysts


-Multiple, repeated home straight caths prior to coming to the ED beginning at 

least ~6 months ago


-Acute on chronic back pain per patients wife


-Diarrhea is nearly resolved


-Profound worsening of his baseline confusion


-Await final sensitivities, indwelling bueno to remain in place





Acute metabolic encephalopathy


-Patients wife reported that the patient took off for a walk by himself, which 

was out of character for him yesterday before coming down with a fever leading 

to this admission


-Patient has baseline dementia with sundowning at home


-Profoundly confused throughout the night, requiring sedation


-Likely a result of this illness, qualifier for complicated UTI (pyelonephritis)


-Continue to treat illness, treat with low dose Ativan PO if needed 





Fever


-Temp max was 38.7 C soon after arriving to the nursing floor, continues today 

at 1251PM, with a temp of 38.0 C


-Treating infection, Tylenol for symptoms





Chronic atrial fibrillation


-Likely the contributing factor with the patients history of CVA


-Continues on pradaxa BID, wife was instructed to take pills home


-No other medications for rate control on home med list


-Not on any rate controlling medication


-First diagnosed just prior to his stroke, but there was a delay with starting 

anticoagulation at the time


-Monitor vital signs





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


-On finasteride at home, recommend stopping since indwelling bueno will be kept 

in place


-Given the patients baseline dementia and short term memory loss, the straight 

cath routine at home may be more risk than beneficial at home


-Palliative care with Negra Calhoun with plans to follow up outpatient





Moderate dementia with behavioral disturbance


-Patient has progressively become worse in the past 6 months with absolutely no 

memory per patients wife


-Status post CVA with cognitive deficit residual


-Palliative care with Negra Calhoun with plans to follow up outpatient





COPD with emphysema


-Patient smoked for greater than 30 years in total, stopped in the year 1997


-Consequently had carotid artery occlusion, CVA, HTN, BNP (vascular 

complications)


-No recent pneumonia, no cough


-No home inhalers, not needed at this point

## 2020-03-16 NOTE — CONSULTATION NOTE
Palliative Care Consultation





- Referral


Referring Provider: Masha VALLES


Time of Visit: 


Referral setting: Hospitalized patient


Referral Reason: Goals of Care/Dementia/UTI/Sepsis





- Information Sources


Records reviewed: RN notes reviewed, Previous records reviewed


History/Review of Systems obtained from: Patient, Family (wife Sarai provided 

history)


Exam limitations: Clinical condition (moderate to severe dementia)





- History of Present Illness


Brief History of Present Illness: 


This is a 75-year-old who presented to [facility] on 3/14/2020 with high 

temp of 104, and worsening symptoms of UTI.  Patient has had a history of BPH, 

has been self cathing which has become more difficult in the context of his 

dementia.  She had been told by urologist, only other option was suprapubic 

catheter, she is quite relieved to find he can have a Bueno catheter.  Patient 

has been hospitalized in the past with sepsis related to UTI.  And according to 

wife has been treated multiple times by his primary care provider for UTIs.  Her

frustration is she can see the changes happening, and often it takes a couple 

weeks to get in.  We did discuss so at that point in time, often the UTI has 

resolved, with pushing fluids and a more conservative approach.





Patient does have a history of a significant stroke 7-1/2 years ago, with that 

has comes severe cognitive issues and short-term memory issues.  Patient mark dobbs has some insight into this, though does not recognize the severity of his

deficits.  He was sent to neurology, he is due for a follow-up test in May.  She

had made the appointment as he has continued to decline fairly rapidly, and has 

presented with symptoms of sundowning, perseveration, and most of these have 

been focused on his feeling he "I have to pee".  She would get quite frustrated 

with him as there was no correcting this or talking him out of it, and patient 

does not have the dexterity nor the wherewithal to be cathing with good 

technique at this point in time.





Patient is quite pleasant, easy to engage.  They are still involved in their 

community, he was absolutely "vibrant" previous to his stroke, was a voracious 

reader, and now is unable to read though still takes a [employer].  He 

has lost more more executive function, 3 months ago was able to make coffee, now

cannot figure it out.  He was driving last , and now has difficulty tracking

conversations.  Patient's history includes being a , so his 

conversations are somewhat circuitous as if he is running and meeting.  Their 

experience has been with his father, who had Alzheimer's and ended up in a 

nursing home, so she is trying to keep his mobility, they are currently in 

physical therapy with performance therapy, as well as a one-on-one program with 

a  since August.  He has not had any falls.





Patient is responding to antibiotics, is feeling somewhat better, does not have 

fever and chills though did run a low-grade temp this morning.  He has been 

mostly bedbound, he is eating and drinking, but is still quite impulsive and 

needs his wife to oversee his care still.





Patient's other comorbidities include chronic atrial fib, neurogenic bladder as 

a late effect of CVA, moderate dementia with behavioral disturbances, COPD with 

emphysema, history of CAD D, carotid artery occlusion, and is awaiting results 

of an echo.








Medical/Surgical History





- Past Medical History


Cardiovascular: reports: Hypertension, High cholesterol, Atrial fibrillation


Respiratory: reports: None


Neuro: Dementia, CVA


Endocrine/Autoimmune: reports: None


GI: reports: None


: reports: Benign prostate hypertrophy, Retention, Incontinence, Indwelling 

catheter (new to be permanent)


Psych: reports: Anxiety, Other (sunding; perseverative behaviors)


Musculoskeletal: reports: Chronic back pain


Derm: reports: None


MRSA Hx?: No





- Past Surgical History


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


Ortho: reports: Hip replacement (left), Knee replacement (left)


HEENT: reports: Tonsil/Adenoidectomy, Other (Carotid endarterectomy)





Social History





- Living Situation


Living arrangement: At home


Living Situation: With spouse/s.o.


Support System: 


Patient lives with his saskia wife of 56 years, .  She is aware of the 

natural course of dementia, and is looking forward to 1 of their 4 sons coming 

who is moving in August nearby.  They currently live in their old family home, 

she does have friends she walks with on a regular basis, she does not attend the

Alzheimer's support groups.  She reports all 4 children are very attentive and 

supportive.








Family History





- Family History


Family History: Mother: , CAD, Diabetes, Type 2, Father: , 

Alzheimer's Disease





Medications/Allergies





- Medications


Active Medication List: 





Active Medications





Acetaminophen (Tylenol)  650 mg PO Q6HR PRN


   PRN Reason: Pain 1 to 4


   Last Admin: 03/15/20 21:00 Dose:  650 mg


Calcium Carbonate/Glycine (Tums)  500 mg PO TID [facility]


   Last Admin: 20 14:08 Dose:  Not Given


Dabigatran (Pradaxa)  150 mg PO BID [facility]


   Last Admin: 20 09:37 Dose:  150 mg


Docusate Sodium (Colace 250mg Capsule)  250 - 500 mg PO DAILY [facility]


   Last Admin: 20 09:01 Dose:  500 mg


Meropenem 1 gm/ Sodium (Chloride)  100 mls @ 200 mls/hr IV Q8H [facility]


   Last Infusion: 20 16:34 Dose:  Infused


Lorazepam (Ativan)  0.5 mg PO QPM PRN


   PRN Reason: Insomnia


   Last Admin: 03/15/20 22:10 Dose:  0.5 mg


Pantoprazole Sodium (Protonix)  40 mg PO QDAC [facility]


   Last Admin: 20 05:57 Dose:  40 mg


Polyethylene Glycol (Miralax)  17 gm PO DAILY [facility]


   Last Admin: 20 09:01 Dose:  17 gm


Sodium Chloride (Normal Saline Flush 0.9%)  10 ml IVP PRN PRN


   PRN Reason: AS NEEDED PER PROVIDER ORDERS


Sodium Chloride (Normal Saline Flush 0.9%)  10 ml IVP 0100,0900,1700 [facility]


   Last Admin: 20 16:05 Dose:  10 ml





                                        





Dabigatran [Pradaxa] 150 mg PO BID 13 


Finasteride 5 mg PO DAILY 20 











- Allergies


Allergies/Adverse Reactions: 


                                    Allergies











Allergy/AdvReac Type Severity Reaction Status Date / Time


 


No Known Drug Allergies Allergy   Verified 18 10:21














Review of Systems





- Constitutional


Constitutional: reports: Fatigue, Fever (recent prior to admission; low grade 

earlier today 37.4), Weight stable (190)





- Eyes


Eyes: reports: Vision loss





- Ears, Nose & Throat


Ears, Nose & Throat: reports: Hearing loss





- Respiratory


Respiratory: denies: SOB at rest





- Gastrointestinal


Gastrointestinal: reports: Diarrhea (improved), Good appetite





- Genitourinary


Genitourinary: reports: Other (new bueno)





- Musculoskeletal


Musculoskeletal: reports: Stiffness, Muscle weakness





- Integumentary


Integumentary: reports: Dryness





- Neurological


Neurological: reports: General weakness, Memory problems (worsening; more 

acutely in last few months;)





- Psychiatric


Psychiatric: reports: Anxiety, Behavior disturbances





- Hematologic/Lymphatic


Hematologic/Lymphatic: reports: Recurrent infections (UTIs; frequent tx)





- All Other Systems


All Other Systems: reports: Other (limited for ROS with patient report)





Physical Exam





- Vital Signs


Vital Signs: 





                                Vital Signs x48h











  Temp Pulse Resp BP Pulse Ox


 


 20 16:18  37.0 C  49 L  18  109/65  97


 


 20 14:56   75   127/63 














- Physical Exam


General Appearance: positive: No acute distress, Alert


Eyes Bilateral: positive: Other (eyes matted with secretions)


ENT: negative: Dry mucous membranes


Neck: positive: No JVD, Trachea midline


Cardiovascular: positive: Irregular


Respiratory: positive: No respiratory distress, Diminished in bases.  negative: 

Wheezes, Rales, Rhonchi


Abdomen: positive: Non-tender, Soft, Nml bowel sounds


Skin: positive: Pallor, Dryness


Extremities: positive: No pedal edema


Neurologic/Psychiatric: positive: Mood/affect nml, Disoriented to time, 

Weakness, Flat affect





Palliative Care





- POLST


Patient has POLST: No


POLST Status: Full Code (discussion regarding CODE status; please see Palliative

 Care discussion)


Pain: Pain unchanged, Location (intermittent low back pain; exacerbated with 

"tasks"; currently not on medication)


Tiredness/Fatigue: Moderate (4-6)


Feelings of wellbeing/Perceived Quality of Life: Fair, Acceptable, Comment (per 

wife; see PC discussion)


Sleep: Sleeps poorly


Performance Status: 


Patient's functional status has been declining, it is more difficult for him to 

walk, though has not had any falls.  Sarai is fearful of him eventually ending up

 in a wheelchair.  They do have a walk-in shower with a seat, he does need 

assistance in direction and cueing for dressing.  It will be interesting given 

his impulsivity, if he is going to be able to manage a Bueno catheter, is 

certainly preferable to straight cathing.








- Palliative Care


Discussion: 


 Did discuss goals, hope is to return home, with recovery from his sepsis/UTI.  

Patient though has been on a downward spiral both functionally and cognitively. 

 They do have some advanced care planning documents, but these were done around 

the time of the stroke, will need to get a copy of these for the records.  


She does not have a POLST for her .  In the context of CODE STATUS, her 

original conversation started out with she would want initially things done, and

 make a decision as far as withdrawing care.  Did reframe it in the context at 

this point in time with his advanced age, more comorbidities, decline, that most

 likely the chance of success would be smaller, as well as the likelihood of his

 recovery back to his previous level of function slight to none.  She is quite 

clear in her context of discussing patient's decline, that she would not want 

surgery again, as anesthesia made him worse in his dementia seem to decline more

 rapidly.  We discussed this in the same context as a procedure the DNAR, and 

most likely would not benefit him.  Did introduce the POLST with DNA R versus 

CPR; but also then followed up with given the goals of they are expressing, 

would be to weigh the benefits and burdens moving forward of as decisions as 

they came up, patient could still have been a DNA R and received the care he is 

received.  She will discuss this with her sons, and we agreed to meet in 2 to 3 

weeks to complete the POLST and follow-up on further goals of care.





Her goal is to keep him as functional as possible, she does not want to have to 

place him in a nursing home like she did with her father-in-law, but does 

recognize she also needs to take care of herself.  Her mother-in-law did care 

for her patient's father at home for long period of time until it got too much. 

 She is looking forward to having her son's support.  She does describe behav

iors so that are indicative of sundowning, even with the resolution of the Bueno

 catheter suspect these will continue and may continue to worsen.





Patient was present for the conversation, did not seem to be able to grasp, when

 asked what his understanding was of conversation.  He went on to run a "manager

 meeting", was very sweet but had no insight.  Patient does not present with 

decision-making capacity to be able to weigh benefits and burdens of medical 

decisions, this will fall to Sarai.  She would like the support of her sons, and 

further following up and defining POLST.








Results





- Lab Results


Lab results reviewed: Yes


Fish Bones: 


                                 20 05:00





                                 20 05:00


Lab and Imaging Results: 





                               Lab Results x24hrs











  20 Range/Units





  05:00 05:00 05:00 


 


WBC    18.2 H  (4.8-10.8)  x10^3/uL


 


RBC    4.24 L  (4.70-6.10)  10^6/uL


 


Hgb    13.5 L  (14.0-18.0)  g/dL


 


Hct    42.3  (42.0-52.0)  %


 


MCV    99.8 H  (80.0-94.0)  fL


 


MCH    31.8 H  (27.0-31.0)  pg


 


MCHC    31.9 L  (32.0-36.0)  g/dL


 


RDW    13.5  (12.0-15.0)  %


 


Plt Count    211  (130-450)  10^3/uL


 


MPV    12.0 H  (7.4-11.4)  fL


 


Neut # (Auto)    13.3 H  (1.5-6.6)  10^3/uL


 


Lymph # (Auto)    2.7  (1.5-3.5)  10^3/uL


 


Mono # (Auto)    1.9 H  (0.0-1.0)  10^3/uL


 


Eos # (Auto)    0.1  (0.0-0.7)  10^3/uL


 


Baso # (Auto)    0.1  (0.0-0.1)  10^3/uL


 


Absolute Nucleated RBC    0.00  x10^3/uL


 


Nucleated RBC %    0.0  /100WBC


 


Sodium   139   (135-145)  mmol/L


 


Potassium   4.0   (3.5-5.0)  mmol/L


 


Chloride   105   (101-111)  mmol/L


 


Carbon Dioxide   26   (21-32)  mmol/L


 


Anion Gap   8.0   (6-13)  


 


BUN   13   (6-20)  mg/dL


 


Creatinine   0.9   (0.6-1.2)  mg/dL


 


Estimated GFR (MDRD)   82 L   (>89)  


 


Glucose   94   ()  mg/dL


 


Calcium   8.5   (8.5-10.3)  mg/dL


 


TSH  0.80    (0.34-5.60)  uIU/mL














Impression and Recommendations





- Palliative Care


Impression: 


 This is a 75-year-old gentleman with worsening dementia, most likely mixed 

vascular/Alzheimer's given description and history.  Patient is not appropriate 

candidate for self cathing, and will continue with Bueno catheter for long-term 

maintenance/management.  Patient does present with functional and cognitive 

decline, increasing care needs, and concern for future goals of care.  

Palliative care to provide support regarding anticipatory guidance around 

advanced care planning and educating regarding disease trajectory.





Recommendations/Counseling Done: 


1. BPH.  Counseling provided regarding long-term management of Bueno catheter 

indwelling.  Patient no longer candidate for self cathing, and documented BPH as

 well as neurogenic bladder in chart.  Patient does not present is homebound at 

this point in time, would recommend in follow-up with PCP and this information 

is shared with wife, for [place] [facility] 

[place] 4 to 6 weeks.  Patient will also need ongoing supplies, these can be 

ordered through [facility].  Would recommend nursing work with patient 

and wife and transition plan, with leg bag, Sarai will need support and training 

to manage this.





2.  Generalized weakness.  Patient has reinitiated outpatient physical therapy, 

would recommend continue with this plan.  Follow-up with hospitalist regarding 

PT eval and work with patient inpatient.  Sarai does need patient to be 

functional, to be able to continue to manage him at home.  She does understand 

with each hospitalization, will continue to be some functional and cognitive 

decline, but would like to mitigate this if possible.





3.  Dementia with behavioral disturbances.  Sarai did stay the night, very much 

appreciated patient sleeping, looks like they used Ativan.  Patient may need if 

continues with perseverating behaviors, and sundowning low-dose antipsychotic at

 bedtime.  Patient is due to see the neurologist in May, may need to be 

addressed sooner by PCP or on discharge.  Counseling provided regarding the 

natural course of disease, as well as trajectory.  Sarai has some insight related

 to her experience with her father-in-law, counseling provided to link also the 

trajectory to goals of care.





4.  Advanced care planning.  Sarai reports they have done documents that are 

reflective of not prolonging suffering, looking at quality of life issues, but 

do not have a POLST.  Counseling and education regarding POLST provided, as well

 as CODE STATUS and implications as well as recommendations for shared decision 

making in the future.She does want to speak with her sons prior to filling out 

POLST, she will be the one needing to sign, though certainly can have patient 

participate with his values. Counseling provided regarding the role of 

palliative care, will pursue outpatient referral and follow-up in the community 

setting with PCP.





Time Spent: 


 60 minutes with greater than 50% of this done in counseling regarding advanced 

care planning, disease trajectory, continuum of care and anticipatory guidance. 

 Coordination of care with hospital team.

## 2020-03-17 LAB
ANION GAP SERPL CALCULATED.4IONS-SCNC: 8 MMOL/L (ref 6–13)
BASOPHILS # BLD MANUAL: 0 10^3/UL (ref 0–0.1)
BASOPHILS NFR BLD AUTO: 0.1 10^3/UL (ref 0–0.1)
BASOPHILS NFR BLD AUTO: 0.4 %
BASOPHILS NFR BLD AUTO: 0.6 %
BUN SERPL-MCNC: 13 MG/DL (ref 6–20)
CALCIUM UR-MCNC: 8.3 MG/DL (ref 8.5–10.3)
CHLORIDE SERPL-SCNC: 106 MMOL/L (ref 101–111)
CO2 SERPL-SCNC: 24 MMOL/L (ref 21–32)
CREAT SERPLBLD-SCNC: 0.8 MG/DL (ref 0.6–1.2)
EOSINOPHIL # BLD AUTO: 0.2 10^3/UL (ref 0–0.7)
EOSINOPHIL # BLD MANUAL: 0 10^3/UL (ref 0–0.7)
EOSINOPHIL NFR BLD AUTO: 1 %
EOSINOPHIL NFR BLD AUTO: 1.3 %
ERYTHROCYTE [DISTWIDTH] IN BLOOD BY AUTOMATED COUNT: 13.2 % (ref 12–15)
ERYTHROCYTE [DISTWIDTH] IN BLOOD BY AUTOMATED COUNT: 13.3 % (ref 12–15)
GFRSERPLBLD MDRD-ARVRAT: 94 ML/MIN/{1.73_M2} (ref 89–?)
GLUCOSE SERPL-MCNC: 105 MG/DL (ref 70–100)
HGB UR QL STRIP: 12.8 G/DL (ref 14–18)
HGB UR QL STRIP: 13 G/DL (ref 14–18)
LYMPH ABN NFR BLD MANUAL: 0 %
LYMPHOBLASTS # BLD: 11 %
LYMPHOCYTES # BLD MANUAL: 1.6 10^3/UL (ref 1.5–3.5)
LYMPHOCYTES # SPEC AUTO: 1.5 10^3/UL (ref 1.5–3.5)
LYMPHOCYTES NFR BLD AUTO: 10.3 %
LYMPHOCYTES NFR BLD AUTO: 11.2 %
MANUAL DIF COMMENT BLD-IMP: (no result)
MCH RBC QN AUTO: 31.6 PG (ref 27–31)
MCH RBC QN AUTO: 32.7 PG (ref 27–31)
MCHC RBC AUTO-ENTMCNC: 31.8 G/DL (ref 32–36)
MCHC RBC AUTO-ENTMCNC: 33 G/DL (ref 32–36)
MCV RBC AUTO: 99.2 FL (ref 80–94)
MCV RBC AUTO: 99.5 FL (ref 80–94)
MONOCYTES # BLD AUTO: 1.4 10^3/UL (ref 0–1)
MONOCYTES # BLD MANUAL: 1.3 10^3/UL (ref 0–1)
MONOCYTES NFR BLD AUTO: 13 %
MONOCYTES NFR BLD AUTO: 9.7 %
NEUTROPHILS # BLD AUTO: 11.1 10^3/UL (ref 1.5–6.6)
NEUTROPHILS # SNV AUTO: 14.3 X10^3/UL (ref 4.8–10.8)
NEUTROPHILS # SNV AUTO: 14.9 X10^3/UL (ref 4.8–10.8)
NEUTROPHILS NFR BLD AUTO: 73.4 %
NEUTROPHILS NFR BLD AUTO: 77.8 %
NEUTS BAND NFR BLD: 1 %
PDW BLD AUTO: 10.9 FL (ref 7.4–11.4)
PDW BLD AUTO: 11.8 FL (ref 7.4–11.4)
PLAT MORPH BLD: (no result)
PLATELET # BLD: 182 10^3/UL (ref 130–450)
PLATELET # BLD: 184 10^3/UL (ref 130–450)
PLATELET BLD QL SMEAR: (no result)
RBC MAR: 3.91 10^6/UL (ref 4.7–6.1)
RBC MAR: 4.11 10^6/UL (ref 4.7–6.1)
RBC MORPH BLD: (no result)
SODIUM SERPLBLD-SCNC: 138 MMOL/L (ref 135–145)

## 2020-03-17 RX ADMIN — SODIUM CHLORIDE SCH MLS/HR: 900 INJECTION INTRAVENOUS at 00:57

## 2020-03-17 RX ADMIN — SODIUM CHLORIDE, PRESERVATIVE FREE SCH ML: 5 INJECTION INTRAVENOUS at 16:30

## 2020-03-17 RX ADMIN — DABIGATRAN ETEXILATE MESYLATE SCH MG: 75 CAPSULE ORAL at 09:55

## 2020-03-17 RX ADMIN — CALCIUM CARBONATE (ANTACID) CHEW TAB 500 MG SCH MG: 500 CHEW TAB at 21:14

## 2020-03-17 RX ADMIN — DOCUSATE SODIUM SCH MG: 250 CAPSULE, LIQUID FILLED ORAL at 09:55

## 2020-03-17 RX ADMIN — SODIUM CHLORIDE SCH MLS/HR: 900 INJECTION INTRAVENOUS at 09:55

## 2020-03-17 RX ADMIN — CALCIUM CARBONATE (ANTACID) CHEW TAB 500 MG SCH: 500 CHEW TAB at 16:30

## 2020-03-17 RX ADMIN — SODIUM CHLORIDE, PRESERVATIVE FREE SCH ML: 5 INJECTION INTRAVENOUS at 10:12

## 2020-03-17 RX ADMIN — SODIUM CHLORIDE SCH MLS/HR: 900 INJECTION INTRAVENOUS at 16:28

## 2020-03-17 RX ADMIN — SODIUM CHLORIDE, PRESERVATIVE FREE SCH ML: 5 INJECTION INTRAVENOUS at 00:57

## 2020-03-17 RX ADMIN — CALCIUM CARBONATE (ANTACID) CHEW TAB 500 MG SCH MG: 500 CHEW TAB at 06:56

## 2020-03-17 RX ADMIN — PANTOPRAZOLE SODIUM SCH MG: 40 TABLET, DELAYED RELEASE ORAL at 06:55

## 2020-03-17 RX ADMIN — ACETAMINOPHEN PRN MG: 325 TABLET ORAL at 06:55

## 2020-03-17 RX ADMIN — DABIGATRAN ETEXILATE MESYLATE SCH MG: 75 CAPSULE ORAL at 21:14

## 2020-03-17 NOTE — PROVIDER PROGRESS NOTE
Subjective





- Prog Note Date


Prog Note Date: 03/17/20





- Subjective


Pt reports feeling: Worse


Subjective: 


pt present very confused today. pt has hx of dementia. pt does not present focal

neurological deficits. pt has no fever, chill. 


discussed with pt's wife for d/c plan for pt. pt's wife refused pt to be d/c to 

nurse home, consulted with social work for safely d/c plan








Current Medications





- Current Medications


Current Medications: 





Active Medications





Acetaminophen (Tylenol)  650 mg PO Q6HR PRN


   PRN Reason: Pain 1 to 4


   Last Admin: 03/17/20 06:55 Dose:  650 mg


Calcium Carbonate/Glycine (Tums)  500 mg PO TID [facility]


   Last Admin: 03/17/20 06:56 Dose:  500 mg


Dabigatran (Pradaxa)  150 mg PO BID [facility]


   Last Admin: 03/17/20 09:55 Dose:  150 mg


Docusate Sodium (Colace 250mg Capsule)  250 - 500 mg PO DAILY [facility]


   Last Admin: 03/17/20 09:55 Dose:  500 mg


Meropenem 1 gm/ Sodium (Chloride)  100 mls @ 200 mls/hr IV Q8H [facility]


   Last Infusion: 03/17/20 10:25 Dose:  Infused


Lorazepam (Ativan)  0.5 mg PO QPM PRN


   PRN Reason: Insomnia


   Last Admin: 03/16/20 23:03 Dose:  0.5 mg


Pantoprazole Sodium (Protonix)  40 mg PO QDAC [facility]


   Last Admin: 03/17/20 06:55 Dose:  40 mg


Polyethylene Glycol (Miralax)  17 gm PO DAILY [facility]


   Last Admin: 03/17/20 09:56 Dose:  17 gm


Sodium Chloride (Normal Saline Flush 0.9%)  10 ml IVP PRN PRN


   PRN Reason: AS NEEDED PER PROVIDER ORDERS


Sodium Chloride (Normal Saline Flush 0.9%)  10 ml IVP 0100,0900,1700 [facility]


   Last Admin: 03/17/20 10:12 Dose:  10 ml


Zolpidem Tartrate (Ambien)  5 mg PO QPM PRN


   PRN Reason: Insomnia





                                        





Dabigatran [Pradaxa] 150 mg PO BID 09/22/13 


Finasteride 5 mg PO DAILY 03/14/20 











Objective





- Vital Signs/Intake & Output


Vital Signs: 


                                Vital Signs x48h











  Temp Pulse Pulse Resp BP BP Pulse Ox


 


 03/17/20 11:33   89    126/73  


 


 03/17/20 09:53   92    147/91 H  


 


 03/17/20 09:34  37.2 C   75  18   141/91 H  94














  Pulse Ox Pulse Ox


 


 03/17/20 11:33  99 


 


 03/17/20 09:53   99


 


 03/17/20 09:34  











Intake & Output: 


                                 Intake & Output











 03/14/20 03/15/20 03/16/20 03/17/20





 23:59 23:59 23:59 23:59


 


Intake Total 1100 3468.333 3051.333 920


 


Output Total  2800 1925 2200


 


Balance 1100 535.607 4205.333 -1280














- Objective


General Appearance: positive: No acute distress, Alert.  negative: Lethargic


Eyes Bilateral: positive: Normal inspection, PERRL, No lid inflammation


ENT: positive: ENT inspection nml, Pharynx nml, No signs of dehydration.  

negative: Purulent nasal drainage


Neck: positive: Nml inspection, Thyroid nml, No JVD


Respiratory: positive: Chest non-tender, No respiratory distress, Breath sounds 

nml.  negative: Wheezes, Rales, Rhonchi


Cardiovascular: positive: Regular rate & rhythm, No murmur, No gallop.  

negative: Irregularly irregular, Extrasystoles, Tachycardia, Bradycardia, 

Systolic murmur, Diastolic murmur


Peripheral Pulses: 2+ Radial (R), 2+ Radial (L), 2+ Dorsalis pedis (R), 2+ 

Dorsalis pedis (L)


Abdomen: positive: Non-tender, No organomegaly, Nml bowel sounds, No distention.

  negative: Tenderness, Guarding, Rebound


Back: positive: Nml inspection.  negative: CVA tenderness (R), CVA tenderness 

(L)


Skin: positive: Color nml, No rash, Warm, Dry.  negative: Cyanosis, Diaphoresis,

 Pallor


Extremities: positive: Non-tender, Full ROM, Nml appearance.  negative: Calf 

tenderness, Daniel's sign/cords


Neurologic/Psychiatric: positive: Sensation nml.  negative: Weakness, Sensory l

oss, Facial droop, Slurred/abnml speech, Depressed mood/affect





- Lab Results


Fish Bones: 


                                 03/17/20 14:00





                                 03/17/20 05:00


Other Labs: 


                               Lab Results x24hrs











  03/17/20 03/17/20 03/17/20 Range/Units





  14:00 08:00 08:00 


 


WBC  14.9 H    (4.8-10.8)  x10^3/uL


 


RBC  4.11 L    (4.70-6.10)  10^6/uL


 


Hgb  13.0 L    (14.0-18.0)  g/dL


 


Hct  40.9 L    (42.0-52.0)  %


 


MCV  99.5 H    (80.0-94.0)  fL


 


MCH  31.6 H    (27.0-31.0)  pg


 


MCHC  31.8 L    (32.0-36.0)  g/dL


 


RDW  13.3    (12.0-15.0)  %


 


Plt Count  182    (130-450)  10^3/uL


 


MPV  10.9    (7.4-11.4)  fL


 


Neut # (Auto)  Not Reportable    (1.5-6.6)  10^3/uL


 


Lymph # (Auto)  Not Reportable    (1.5-3.5)  10^3/uL


 


Mono # (Auto)  Not Reportable    (0.0-1.0)  10^3/uL


 


Eos # (Auto)  Not Reportable    (0.0-0.7)  10^3/uL


 


Baso # (Auto)  Not Reportable    (0.0-0.1)  10^3/uL


 


Absolute Nucleated RBC  Not Reportable    x10^3/uL


 


Total Counted  100    


 


Band Neuts % (Manual)  1   (0 - 10) %


 


Abnorm Lymph % (Manual)  0    %


 


Nucleated RBC %  Not Reportable    /100WBC


 


Neutrophils # (Manual)  11.9 H    (1.5-6.6)  10^3/uL


 


Lymphocytes # (Manual)  1.6    (1.5-3.5)  10^3/uL


 


Monocytes # (Manual)  1.3 H    (0.0-1.0)  10^3/uL


 


Eosinophils # (Manual)  0.0    (0-0.7)  10^3/uL


 


Basophils # (Manual)  0.0    (0-0.1)  10^3/uL


 


Differential Comment  MANUAL DIFFERENTIAL    


 


Platelet Estimate  NORMAL (130-450,000)    (NORMAL)  


 


Platelet Morphology  NORMAL APPEARANCE    (NORMAL)  


 


RBC Morph Micro Appear  NORMAL APPEARANCE    (NORMAL)  


 


ESR   11   (0-20)  mm/Hr


 


Sodium     (135-145)  mmol/L


 


Potassium     (3.5-5.0)  mmol/L


 


Chloride     (101-111)  mmol/L


 


Carbon Dioxide     (21-32)  mmol/L


 


Anion Gap     (6-13)  


 


BUN     (6-20)  mg/dL


 


Creatinine     (0.6-1.2)  mg/dL


 


Estimated GFR (MDRD)     (>89)  


 


Glucose     ()  mg/dL


 


Calcium     (8.5-10.3)  mg/dL


 


C-Reactive Protein    10.9 H  (0-1.0)  mg/dL














  03/17/20 03/17/20 Range/Units





  05:00 05:00 


 


WBC   14.3 H  (4.8-10.8)  x10^3/uL


 


RBC   3.91 L  (4.70-6.10)  10^6/uL


 


Hgb   12.8 L  (14.0-18.0)  g/dL


 


Hct   38.8 L  (42.0-52.0)  %


 


MCV   99.2 H  (80.0-94.0)  fL


 


MCH   32.7 H  (27.0-31.0)  pg


 


MCHC   33.0  (32.0-36.0)  g/dL


 


RDW   13.2  (12.0-15.0)  %


 


Plt Count   184  (130-450)  10^3/uL


 


MPV   11.8 H  (7.4-11.4)  fL


 


Neut # (Auto)   11.1 H  (1.5-6.6)  10^3/uL


 


Lymph # (Auto)   1.5  (1.5-3.5)  10^3/uL


 


Mono # (Auto)   1.4 H  (0.0-1.0)  10^3/uL


 


Eos # (Auto)   0.2  (0.0-0.7)  10^3/uL


 


Baso # (Auto)   0.1  (0.0-0.1)  10^3/uL


 


Absolute Nucleated RBC   0.00  x10^3/uL


 


Total Counted    


 


Band Neuts % (Manual)   (0 - 10) %


 


Abnorm Lymph % (Manual)    %


 


Nucleated RBC %   0.0  /100WBC


 


Neutrophils # (Manual)    (1.5-6.6)  10^3/uL


 


Lymphocytes # (Manual)    (1.5-3.5)  10^3/uL


 


Monocytes # (Manual)    (0.0-1.0)  10^3/uL


 


Eosinophils # (Manual)    (0-0.7)  10^3/uL


 


Basophils # (Manual)    (0-0.1)  10^3/uL


 


Differential Comment    


 


Platelet Estimate    (NORMAL)  


 


Platelet Morphology    (NORMAL)  


 


RBC Morph Micro Appear    (NORMAL)  


 


ESR    (0-20)  mm/Hr


 


Sodium  138   (135-145)  mmol/L


 


Potassium  3.6   (3.5-5.0)  mmol/L


 


Chloride  106   (101-111)  mmol/L


 


Carbon Dioxide  24   (21-32)  mmol/L


 


Anion Gap  8.0   (6-13)  


 


BUN  13   (6-20)  mg/dL


 


Creatinine  0.8   (0.6-1.2)  mg/dL


 


Estimated GFR (MDRD)  94   (>89)  


 


Glucose  105 H   ()  mg/dL


 


Calcium  8.3 L   (8.5-10.3)  mg/dL


 


C-Reactive Protein    (0-1.0)  mg/dL














ABX Reporting


Has patient been on IV antibiotics over the past 48 hours?: Yes





Sepsis Event Note (H)





- Evaluation


Current Stage of Sepsis: Sepsis


Possible source of Sepsis: positive: Genitourinary





- Sepsis Criteria


Sepsis Criteria: Recorded Temperature greater than 38.3C or Less than 36C, 

Recorded Heart Rate greater than 90 bpm, CNS: altered consciousness (unrelated 

to primary neuro pathology)





Assessment/Plan





- Problem List


(1) Confused


Impression: 





Pt is very confused today afternoon. pt hx of dementia, might be "[place]". but 

pt did not present focal neurological deficits. nurse report he had no good 

sleep on last night. pt's wife sleep at pt's room on last night and made some 

disturbance for pt's sleep. hospital let pt's wife go home. pt is prescribed low

 dosage of Ambien, hopefully it can improve pt's sleep overnight and improve 

pt's confusion. 





Bacteremia due to Escherichia coli


repeated blood culture were negative for bacteremia, pt has no more fever for 

three days. but pt still had elevated WBC at 14.9. pt might had elevated WBC.


CRP is still elevated but ESR is normal. ECHO reveals unremarkable, no 

vegetations in ECHO.


continue Meropenem IV





UTI


positive for UTI, UA culture reveals positive for Ecoli. pt has been treated 

with Meropenem. will switch to PO antibiotics on d/c





Fever


resolved. 





Chronic atrial fibrillation


stable, Continues on pradaxa BID





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


pt hx of dementia and CVA. pt has issue  to remain clean Pineda in home. Palliat

bouchra care with Negra Calhoun with plans to follow up outpatient, plan order home 

health RN and Aids





Moderate dementia with behavioral disturbance


Patient has progressively become worse in the past 6 months with absolutely no 

memory per patients wife, but worsening on today. Status post CVA with 

cognitive deficit residual. Palliative care was consult and follow up outpati

ent. continue nurse support.

## 2020-03-18 VITALS — SYSTOLIC BLOOD PRESSURE: 118 MMHG | DIASTOLIC BLOOD PRESSURE: 76 MMHG

## 2020-03-18 LAB
ANION GAP SERPL CALCULATED.4IONS-SCNC: 7 MMOL/L (ref 6–13)
BASOPHILS NFR BLD AUTO: 0 10^3/UL (ref 0–0.1)
BASOPHILS NFR BLD AUTO: 0.3 %
BUN SERPL-MCNC: 10 MG/DL (ref 6–20)
CALCIUM UR-MCNC: 8.3 MG/DL (ref 8.5–10.3)
CHLORIDE SERPL-SCNC: 103 MMOL/L (ref 101–111)
CO2 SERPL-SCNC: 26 MMOL/L (ref 21–32)
CREAT SERPLBLD-SCNC: 0.7 MG/DL (ref 0.6–1.2)
CRP SERPL-MCNC: 8.7 MG/DL (ref 0–1)
EOSINOPHIL # BLD AUTO: 0.2 10^3/UL (ref 0–0.7)
EOSINOPHIL NFR BLD AUTO: 1.4 %
ERYTHROCYTE [DISTWIDTH] IN BLOOD BY AUTOMATED COUNT: 13.2 % (ref 12–15)
GFRSERPLBLD MDRD-ARVRAT: 109 ML/MIN/{1.73_M2} (ref 89–?)
GLUCOSE SERPL-MCNC: 107 MG/DL (ref 70–100)
HGB UR QL STRIP: 11.8 G/DL (ref 14–18)
LYMPHOCYTES # SPEC AUTO: 1.6 10^3/UL (ref 1.5–3.5)
LYMPHOCYTES NFR BLD AUTO: 13.4 %
MCH RBC QN AUTO: 31.8 PG (ref 27–31)
MCHC RBC AUTO-ENTMCNC: 32.5 G/DL (ref 32–36)
MCV RBC AUTO: 97.8 FL (ref 80–94)
MONOCYTES # BLD AUTO: 1.1 10^3/UL (ref 0–1)
MONOCYTES NFR BLD AUTO: 9.2 %
NEUTROPHILS # BLD AUTO: 8.9 10^3/UL (ref 1.5–6.6)
NEUTROPHILS # SNV AUTO: 11.8 X10^3/UL (ref 4.8–10.8)
NEUTROPHILS NFR BLD AUTO: 75.4 %
PDW BLD AUTO: 11.5 FL (ref 7.4–11.4)
PLATELET # BLD: 179 10^3/UL (ref 130–450)
RBC MAR: 3.71 10^6/UL (ref 4.7–6.1)
SODIUM SERPLBLD-SCNC: 136 MMOL/L (ref 135–145)

## 2020-03-18 RX ADMIN — SODIUM CHLORIDE SCH MLS/HR: 900 INJECTION INTRAVENOUS at 09:07

## 2020-03-18 RX ADMIN — DABIGATRAN ETEXILATE MESYLATE SCH MG: 75 CAPSULE ORAL at 10:37

## 2020-03-18 RX ADMIN — DOCUSATE SODIUM SCH MG: 250 CAPSULE, LIQUID FILLED ORAL at 10:37

## 2020-03-18 RX ADMIN — SODIUM CHLORIDE, PRESERVATIVE FREE SCH ML: 5 INJECTION INTRAVENOUS at 09:09

## 2020-03-18 RX ADMIN — PANTOPRAZOLE SODIUM SCH MG: 40 TABLET, DELAYED RELEASE ORAL at 10:38

## 2020-03-18 RX ADMIN — CALCIUM CARBONATE (ANTACID) CHEW TAB 500 MG SCH MG: 500 CHEW TAB at 14:54

## 2020-03-18 RX ADMIN — SODIUM CHLORIDE, PRESERVATIVE FREE SCH ML: 5 INJECTION INTRAVENOUS at 00:19

## 2020-03-18 RX ADMIN — ACETAMINOPHEN PRN MG: 325 TABLET ORAL at 05:09

## 2020-03-18 RX ADMIN — SODIUM CHLORIDE SCH MLS/HR: 900 INJECTION INTRAVENOUS at 00:15

## 2020-03-18 RX ADMIN — CALCIUM CARBONATE (ANTACID) CHEW TAB 500 MG SCH: 500 CHEW TAB at 06:26

## 2020-03-18 NOTE — DISCHARGE PLAN
Discharge Plan


Problem Reviewed?: Yes


Disposition: 06 Home Health Service


Condition: Poor


Prescriptions: 


Ciprofloxacin [Cipro] 500 mg PO BID #28 tablet


Diet: Regular


Activity Restrictions: Activity as Tolerated


Shower Restrictions: No (fall precaution)


Instruction Topics:  Ciprofloxacin tablets, Catheter Bag Urinary Empty Clean, 

Catheter Indwelling Urinary Dc, Leg Bag Care Dc


Health Concerns: 


UTI and Pineda care





Plan of Treatment: 


you was found to have fever, UTI and bacteremia. you are prescribed antibiotics 

Cipro to finish the treatment course. Home health RN and Aide are arranged for 

your Pineda care and your dementia care.


Care Goals: 


stabilization and improvement of your medical conditions


Assessment: 


discussed with you about the care plan, you understood


Additional Instructions or Follow Up instructions: 


you may followup your PCP in one to two weeks, followup your urologist as out-

pt. Should your symptoms return or worsen, you may present ER or call 911 for 

help.


No Smoking: If you smoke, Please STOP!  Call [phone] for help.


Follow-up with: 


Luis Neff MD [Primary Care Provider] -

## 2020-03-18 NOTE — DISCHARGE SUMMARY
Discharge Summary


Admit Date: 03/14/20


Discharge Date: 03/18/20


Discharging Provider: Jose Turcios


Primary Care Provider: Luis Moreau


Condition at Discharge: Poor


Discharge Disposition: 06 Home Health Service


Discharge Facility Name: home





- DIAGNOSES


Admission Diagnoses: 


(1) Urinary tract infection





(2) Chronic atrial fibrillation





(3) BPH (benign prostatic hyperplasia)





Discharge Diagnoses with Status of Each Condition: 





 acute Confused


resolved as pt's baseline





Bacteremia due to Escherichia coli


stable. pt is prescribed antibiotics Cipro to finish the treatment course. pt 

was found UTI and bacteremia with positive for Ecoli. second blood culture was 

negative





UTI


stable, UA culture reveals positive for Ecoli. pt is prescribed PO antibiotics 

on d/c





sepsis 


resolved. pt was found to have fever, elevated WBC, and bacteremia with positive

for Ecoli. pt is prescribed antibiotics Cipro to finish the treatment course.  





Chronic atrial fibrillation


stable, Continues on pradaxa BID





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


pt hx of dementia and CVA. pt has issue  to remain clean Pineda in home. P

alliative care with Negra Calhoun with plans to follow up outpatient, plan order 

home health RN and Aids





Moderate dementia with behavioral disturbance


stable/chronic





I just found pt had temperature at 38.1 degree in [facility] at 1520 on 3/18/2020 

on discharge day. I did not get report pt has lower degree fever from nurse on 

3/18/2020. I just talked with nurse working at that day, she state she did not 

get report from nurse tech pt had lower degree fever at 3/18/2020. When I 

ordered the d/c order, pt had normal arrange temperature. pt had no fever for 

nearly three days, second blood culture was negative for bacteremia. pt was 

prescribed antibiotics Cipro for d/c home. I report this situation to nurse 

supervisor Lucinda. he will investigate the situation and take appropriate action 

to correct that.  





- HPI


History of Present Illness: 


refer from Dr. Salguero's HPI on 3/14/2020


Patient is a 74 y/o male who was brought to the ED by his wife because he had a 

temp of 104F at home, could not urinate and had rigors today.


He has a history of BPH. He self-caths multiple times daily. He gets fixated on 

a need to urinate and wakes up every 45 minutes at night to do so.


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


In the ED he had a temperature of 38.4 C, a WBC of 15 and a UA which was 

strongly suggestive of a UTI.


As a result he was presented for admission.


He denied chest pain, dyspnea, abd pain, nausea, vomiting, fever or chills.











- HOSPITAL COURSE


Hospital Course: 


pt was admitted for fever. pt was found to have UTI, and bacteremia. pt was 

treated with antibiotics. after treatment, pt had no more fever for nearly three

days, second blood culture was negative. UTI and blood culture reveals positive 

for Ecoli. pt was prescribed antibiotics Cipro according to the sensitive study.

The detail of hospital course is as the below. 





 acute Confused


resolved as pt's baseline





Bacteremia due to Escherichia coli


stable. pt is prescribed antibiotics Cipro to finish the treatment course. pt 

was found UTI and bacteremia with positive for Ecoli. second blood culture was 

negative





UTI


stable, UA culture reveals positive for Ecoli. pt is prescribed PO antibiotics 

on d/c





sepsis 


resolved. pt was found to have fever, elevated WBC, and bacteremia with positive

for Ecoli. pt is prescribed antibiotics Cipro to finish the treatment course.  





Chronic atrial fibrillation


stable, Continues on pradaxa BID





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


pt hx of dementia and CVA. pt has issue  to remain clean Pineda in home. 

Palliative care with Negra Calhoun with plans to follow up outpatient, plan order

home health RN and Aids





Moderate dementia with behavioral disturbance


stable/chronic








- ALLERGIES


Allergies/Adverse Reactions: 


                                    Allergies











Allergy/AdvReac Type Severity Reaction Status Date / Time


 


No Known Drug Allergies Allergy   Verified 01/21/18 10:21














- MEDICATIONS


Home Medications: 


                                Ambulatory Orders











 Medication  Instructions  Recorded  Confirmed


 


Dabigatran [Pradaxa] 150 mg PO BID 09/22/13 03/15/20


 


Finasteride 5 mg PO DAILY 03/14/20 03/15/20


 


Ciprofloxacin [Cipro] 500 mg PO BID #28 tablet 03/18/20 














- PHYSICAL EXAM AT DISCHARGE


General Appearance: positive: No acute distress, Alert.  negative: Lethargic


Eyes Bilateral: positive: Normal inspection, PERRL, No lid inflammation


ENT: positive: ENT inspection nml, Pharynx nml, No signs of dehydration.  

negative: Purulent nasal drainage


Neck: positive: Nml inspection, Thyroid nml, No JVD, Trachea midline.  negative:

Thyromegaly, Lymphadenopathy (R), Lymphadenopathy (L)


Respiratory: positive: Chest non-tender, No respiratory distress, Breath sounds 

nml.  negative: Wheezes, Rales, Rhonchi


Cardiovascular: positive: Regular rate & rhythm, No murmur, No gallop.  

negative: Irregularly irregular, Extrasystoles, Tachycardia, Bradycardia, JVD 

present, Systolic murmur, Diastolic murmur


Peripheral Pulses: positive: 2+


Abdomen: positive: Non-tender, No organomegaly, Nml bowel sounds, No distention.

 negative: Tenderness, Guarding, Rebound


Back: positive: Nml inspection.  negative: CVA tenderness (R), CVA tenderness 

(L)


Skin: positive: Color nml, No rash, Warm, Dry.  negative: Cyanosis, Diaphoresis,

Pallor


Extremities: positive: Non-tender, Full ROM, Nml appearance.  negative: Calf 

tenderness, Daniel's sign/cords


Neurologic/Psychiatric: positive: Sensation nml, Mood/affect nml.  negative: Wea

kness, Sensory loss, Facial droop, Slurred/abnml speech, Depressed mood/affect





- LABS


Result Diagrams: 


                                 03/18/20 05:10





                                 03/18/20 05:10





- SEPSIS


Current Stage of Sepsis: Sepsis


Possible source of Sepsis: Genitourinary


Sepsis Criteria: Recorded Temperature greater than 38.3C or Less than 36C, 

Recorded Heart Rate greater than 90 bpm, CNS: altered consciousness (unrelated 

to primary neuro pathology)





- FOLLOW UP


Follow Up: 


you was found to have fever, UTI and bacteremia. you are prescribed antibiotics 

Cipro to finish the treatment course. Home health RN and Aide are arranged for 

your Pineda care and your dementia care.


you may followup your PCP in one to two weeks, followup your urologist as out-

pt. Should your symptoms return or worsen, you may present ER or call 911 for 

help.








- TIME SPENT


Time Spent in Discharge (Minutes): 30

## 2020-03-19 ENCOUNTER — HOSPITAL ENCOUNTER (EMERGENCY)
Dept: HOSPITAL 76 - ED | Age: 75
Discharge: HOME | End: 2020-03-19
Payer: MEDICARE

## 2020-03-19 VITALS — DIASTOLIC BLOOD PRESSURE: 100 MMHG | SYSTOLIC BLOOD PRESSURE: 142 MMHG

## 2020-03-19 DIAGNOSIS — I10: ICD-10-CM

## 2020-03-19 DIAGNOSIS — Z03.89: Primary | ICD-10-CM

## 2020-03-19 DIAGNOSIS — F03.90: ICD-10-CM

## 2020-03-19 DIAGNOSIS — Z87.891: ICD-10-CM

## 2020-03-19 PROCEDURE — 99281 EMR DPT VST MAYX REQ PHY/QHP: CPT

## 2020-03-19 PROCEDURE — 99282 EMERGENCY DEPT VISIT SF MDM: CPT

## 2020-03-19 NOTE — ED PHYSICIAN DOCUMENTATION
History of Present Illness





- Stated complaint


Stated Complaint: CATHETER COMPLICATION





- Chief complaint


Chief Complaint: General





- History obtained from


History obtained from: Patient





- History of Present Illness


Timing: Today


Pain level max: 0


Pain level now: 0





- Additonal information


Additional information: 





Apparently the patient's catheter was not draining well earlier today at the 

doctor's office and so they sent him here.  Nothing makes this better or worse. 

No fevers.  No other symptoms





Review of Systems


Constitutional: denies: Fever, Chills


GI: denies: Abdominal Pain, Vomiting, Diarrhea


Skin: denies: Rash





PD PAST MEDICAL HISTORY





- Past Medical History


Cardiovascular: Hypertension, High cholesterol, Atrial fibrillation


Respiratory: None


Neuro: Dementia, CVA


Endocrine/Autoimmune: None


GI: None


: Benign prostate hypertrophy, Retention, Incontinence, Indwelling catheter 

(new to be permanent)


Psych: Anxiety, Other


Musculoskeletal: Chronic back pain


Derm: None





- Past Surgical History


Past Surgical History: Yes


General: Splenectomy, Other


Ortho: Hip replacement, Knee replacement


HEENT: Tonsil/Adenoidectomy, Other (Carotid endarterectomy)





- Present Medications


Home Medications: 


                                Ambulatory Orders











 Medication  Instructions  Recorded  Confirmed


 


Dabigatran [Pradaxa] 150 mg PO BID 09/22/13 03/15/20


 


Finasteride 5 mg PO DAILY 03/14/20 03/15/20


 


Ciprofloxacin [Cipro] 500 mg PO BID #28 tablet 03/18/20 














- Allergies


Allergies/Adverse Reactions: 


                                    Allergies











Allergy/AdvReac Type Severity Reaction Status Date / Time


 


No Known Drug Allergies Allergy   Verified 01/21/18 10:21














- Social History


Does the pt smoke?: No


Smoking Status: Former smoker


Does the pt drink ETOH?: Yes


Does the pt have substance abuse?: No





- Immunizations


Immunizations are current?: Yes





- POLST


Patient has POLST: No


POLST Status: Full Code





PD ED PE NORMAL





- Vitals


Vital signs reviewed: Yes





- General


General: Alert and oriented X 3, No acute distress





- HEENT


HEENT: Moist mucous membranes





- Derm


Derm: Warm and dry





- Extremities


Extremities: No edema





- Neuro


Neuro: Alert and oriented X 3





- Psych


Psych: Normal mood, Normal affect





Results





- Vitals


Vitals: 





                               Vital Signs - 24 hr











  03/19/20





  16:49


 


Temperature 37.2 C


 


Heart Rate 85


 


Respiratory 18





Rate 


 


Blood Pressure 142/100 H


 


O2 Saturation 93








                                     Oxygen











O2 Source [With Activity]      Room air


 


O2 Source                      Room air

















PD MEDICAL DECISION MAKING





- ED course


Complexity details: considered differential, d/w patient


ED course: 





Catheter is draining without any difficulty, clear yellow urine.  Catheter care 

was reviewed by the nurse with the wife.  Patient and family both feel 

comfortable at this time.  Patient and family counseled regarding signs and 

symptoms for which I believe and urgent re-evaluation would be necessary. 

Patient with good understanding of and agreement to plan and is comfortable 

going home at this time





This document was made in part using voice recognition software. While efforts 

are made to proofread this document, sound alike and grammatical errors may 

occur.





Departure





- Departure


Disposition: 01 Home, Self Care


Clinical Impression: 


 Pineda catheter in place





Condition: Good


Instructions:  ED Catheter Care Pineda


Follow-Up: 


Luis Neff MD [Primary Care Provider] - 


Comments: 


Return if you worsen.  Leave the catheter in place.

## 2020-04-01 ENCOUNTER — HOSPITAL ENCOUNTER (OUTPATIENT)
Dept: HOSPITAL 76 - PC | Age: 75
Discharge: HOME | End: 2020-04-01
Attending: NURSE PRACTITIONER
Payer: MEDICARE

## 2020-04-01 DIAGNOSIS — Z79.899: ICD-10-CM

## 2020-04-01 DIAGNOSIS — M62.81: ICD-10-CM

## 2020-04-01 DIAGNOSIS — F03.91: ICD-10-CM

## 2020-04-01 DIAGNOSIS — K59.00: ICD-10-CM

## 2020-04-01 DIAGNOSIS — N40.1: ICD-10-CM

## 2020-04-01 DIAGNOSIS — B96.20: ICD-10-CM

## 2020-04-01 DIAGNOSIS — R62.7: ICD-10-CM

## 2020-04-01 DIAGNOSIS — Z51.5: Primary | ICD-10-CM

## 2020-04-01 DIAGNOSIS — Z91.83: ICD-10-CM

## 2020-04-01 DIAGNOSIS — N39.0: ICD-10-CM

## 2020-04-01 NOTE — CONSULTATION NOTE
Palliative Care Follow Up





- Referral


Referring Provider: Dr. Luis Neff


Time of Visit: DIGITAL PHONE CALL [phone] 


Referral setting: Home


Referral Reason: Demenita/Constipation/BPH/F/up UTI





- Information Sources


Records reviewed: Previous records reviewed


History/Review of Systems obtained from: Family (patient unable to participate 

other than thru wife's questions; spoke with Sarai in home)


Exam limitations: Clinical condition (patient with dementia)





- History of Present Illness Update


Brief HPI Update: 


Patient hospitalized 3/14 through 3/18 for urinary tract infection, has long-

term BPH, and had been straight cathing.  Patient with his increasing dementia, 

is no longer able to maintain appropriate technique, and had bacteremia due to 

E. coli.  He is currently completing his Cipro, is still with his dementia 

having difficulty adapting to Bueno catheter, and now presents with 

constipation.  He has been declining both functionally and cognitively over the 

last few weeks to months, palliative care had initial consult in hospital, 

agreed to follow-up regarding goals of care and support.  Patient is receiving 

support through [facility], and they are wanting to limit exposure so 

telephone follow up set up as do not have video capability. 








Social History





- Living Situation


Living arrangement: At home


Living Situation: With spouse/s.o.


Support System: 


Sarai reports feeling quite isolated, she does have support from her family but 

they are not wanting to expose either 1 of them.  Patient does have some insight

into his dementia, but is having more confusion and difficulty and needing more 

redirection.  Though she does report his behaviors as far as perseverating and 

wandering at night, have improved with placement of the Bueno catheter.








Medications/Allergies





- Medications


Home Medications: 


                                Ambulatory Orders











 Medication  Instructions  Recorded  Confirmed


 


Dabigatran [Pradaxa] 150 mg PO BID 09/22/13 03/15/20


 


Finasteride 5 mg PO DAILY 03/14/20 03/15/20


 


Ciprofloxacin [Cipro] 500 mg PO BID #28 tablet 03/18/20 


 


polyethylene glycoL 3350 [Miralax] 17 gm PO DAILY PRN 04/02/20 04/02/20














- Allergies


Allergies/Adverse Reactions: 


                                    Allergies











Allergy/AdvReac Type Severity Reaction Status Date / Time


 


No Known Drug Allergies Allergy   Verified 01/21/18 10:21














Review of Systems





- Constitutional


Constitutional: reports: Fever (low grade 99.0; no chills), Weakness





- Respiratory


Respiratory: denies: Cough, SOB at rest





- Gastrointestinal


Gastrointestinal: reports: Constipation (having hard stool; c/o difficulty 

during phone call)





- Genitourinary


Genitourinary: reports: Other (bueno catheter; did get leg bag; learning to 

manage with  RN support)





- Musculoskeletal


Musculoskeletal: reports: Muscle weakness (to start PT tomorrow)





- Neurological


Neurological: reports: Memory problems (she does recognize he has not returned 

to baseline; reviewed mostly likely would not)





- Psychiatric


Psychiatric: reports: Anxiety, Aggitation (improved), Other (sleeping better)





- Hematologic/Lymphatic


Hematologic/Lymphatic: reports: Recurrent infections (UTI)





Palliative Care





- POLST


Patient has POLST: No





- Palliative Care


Discussion: 


 Had initiated conversation regarding goals of care and POLST form with wife in 

hospital.  She had wanted to have further conversations with her family.  We 

reviewed the POLST, at this point in time it would be prudent and recommended to

 be DNA R, given her goals are still to treat reversible conditions and 

perceives though his quality of life is deteriorating is still acceptable to 

royce selective treatments.  She does understand he is on a decline, both 

functionally and cognitively.  She will complete the POLST, encouraged her to 

get signed at providers office sooner than later given his high risk for 

recurrent infection and hospitalization.








Impression and Recommendations





- Palliative Care


Impression: 


 This is a 75-year-old gentleman who continues to present with failure to 

thrive, recent hospitalization for UTI and now with permanent Bueno catheter 

placement.  He continues to have functional and cognitive decline, remains at 

high risk for rehospitalization.  Goals are to focus on supporting and improving

 quality of life.





Recommendations/Counseling Done: 


1. BPH.  Patient currently receiving home health services, is getting support 

for monthly catheter changes, patient is homebound given he is too frail to be 

out with the coronavirus.  They are staying isolated.  She does feel like they 

are managing better.  Patient still has continued trouble adapting.


2.  Constipation.  Instructed given the Bueno catheter, she does need to make 

sure he is having soft bowel movements every 1 to 2 days, counseling provided 

regarding initiating MiraLAX at least every other day.  She can also titrate to 

half capful if 1 capful to much for loosening.





3.  Advanced care planning.  Discussion regarding POLST, reviewed options again,

 encouraged to fill out and have local provider sign.  We discussed in the 

future, continue with telephone support, if an acute change or downward 

deterioration can reach out for palliative care for home visit if needed.





Time Spent: 


30 minutes of counseling via phone consult for .

## 2020-04-10 ENCOUNTER — HOSPITAL ENCOUNTER (EMERGENCY)
Dept: HOSPITAL 76 - ED | Age: 75
Discharge: HOME | End: 2020-04-10
Payer: MEDICARE

## 2020-04-10 VITALS — SYSTOLIC BLOOD PRESSURE: 150 MMHG | DIASTOLIC BLOOD PRESSURE: 87 MMHG

## 2020-04-10 DIAGNOSIS — R32: ICD-10-CM

## 2020-04-10 DIAGNOSIS — N40.1: ICD-10-CM

## 2020-04-10 DIAGNOSIS — R31.0: ICD-10-CM

## 2020-04-10 DIAGNOSIS — R33.9: ICD-10-CM

## 2020-04-10 DIAGNOSIS — I10: ICD-10-CM

## 2020-04-10 DIAGNOSIS — I48.91: ICD-10-CM

## 2020-04-10 DIAGNOSIS — T83.091A: Primary | ICD-10-CM

## 2020-04-10 PROCEDURE — 99283 EMERGENCY DEPT VISIT LOW MDM: CPT

## 2020-04-10 PROCEDURE — 51702 INSERT TEMP BLADDER CATH: CPT

## 2020-04-10 NOTE — ED PHYSICIAN DOCUMENTATION
PD HPI MALE 





- Stated complaint


Stated Complaint: MALE 





- Chief complaint


Chief Complaint: General





- History obtained from


History obtained from: Patient





- History of Present Illness


Timing - onset: Today


Timing - duration: Hours (The patient had his catheter changed by home health 

nurse last evening around 5 PM.  There was some blood out in the catheter at the

time of changing it.  He denied it really hurting much.  He had some hematuria 

in the catheter after the placement.  However overnight he feels full bladder 

with distention and there is not been any output in his catheter bag.  He feels 

the catheter is not draining.)


Timing - details: Gradual onset, Still present


Associated symptoms: Indwelling catheter, Pineda problem (not draining overnight,

with full bladder now.)


Recently seen: Other (Home health nurse changed his Pineda catheter 5 PM with 

some blood in the urine after changing it. This was a routine planned Pineda 

change.)





Review of Systems


Constitutional: denies: Fever, Chills


Nose: denies: Congestion


Throat: denies: Sore throat


Respiratory: denies: Cough


GI: denies: Nausea, Vomiting


: reports: Hematuria


Skin: denies: Rash





PD PAST MEDICAL HISTORY





- Past Medical History


Cardiovascular: Hypertension, High cholesterol, Atrial fibrillation


Respiratory: None


Neuro: Dementia, CVA


Endocrine/Autoimmune: None


GI: None


: Benign prostate hypertrophy, Retention, Incontinence, Indwelling catheter


Psych: Anxiety, Other


Musculoskeletal: Chronic back pain


Derm: None





- Past Surgical History


Past Surgical History: Yes


General: Splenectomy, Other


Ortho: Hip replacement, Knee replacement


HEENT: Tonsil/Adenoidectomy, Other





- Present Medications


Home Medications: 


                                Ambulatory Orders











 Medication  Instructions  Recorded  Confirmed


 


Dabigatran [Pradaxa] 150 mg PO BID 09/22/13 03/15/20


 


Finasteride 5 mg PO DAILY 03/14/20 03/15/20


 


Ciprofloxacin [Cipro] 500 mg PO BID #28 tablet 03/18/20 


 


polyethylene glycoL 3350 [Miralax] 17 gm PO DAILY PRN 04/02/20 04/02/20














- Allergies


Allergies/Adverse Reactions: 


                                    Allergies











Allergy/AdvReac Type Severity Reaction Status Date / Time


 


No Known Drug Allergies Allergy   Verified 04/10/20 07:11














- Social History


Does the pt smoke?: No


Smoking Status: Never smoker


Does the pt drink ETOH?: Yes


Does the pt have substance abuse?: No





- Immunizations


Immunizations are current?: Yes





- POLST


Patient has POLST: No


POLST Status: Full Code





PD ED PE NORMAL





- Vitals


Vital signs reviewed: Yes





- General


General: Alert and oriented X 3, Well developed/nourished, Other (He appears 

uncomfortable with a full bladder.  The Pineda catheter is in place without any 

drainage around it.  The penile meatus is normal in appearance.)





- Abdomen


Abdomen: Normal bowel sounds, Soft, No organomegaly, Other (Suprapubic and 

bladder fullness with some tenderness locally.  Upper abdomen is nontender.)





- Male 


Male : Other (Pineda in place with normal external genitalia.)





- Back


Back: No CVA TTP





- Derm


Derm: Normal color, Warm and dry





- Neuro


Neuro: Alert and oriented X 3, No motor deficit, Normal speech





Results





- Vitals


Vitals: 


                               Vital Signs - 24 hr











  04/10/20 04/10/20





  07:11 09:14


 


Temperature 36.9 C 36.8 C


 


Heart Rate 116 H 85


 


Respiratory 18 16





Rate  


 


Blood Pressure 151/107 H 150/87 H


 


O2 Saturation 97 98








                                     Oxygen











O2 Source [With Activity]      Room air


 


O2 Source                      Room air

















PD MEDICAL DECISION MAKING





- ED course


Complexity details: considered differential (Sounds like a clogged Pineda likely 

from blood clots.  The nurse tried irrigation first but was unable to get it to 

clear.  The current Pineda was removed and a new one placed with good drainage of

some clots and a lot of urine.  He is feeling much improved.  The nurse then 

irrigated the bladder and did not get any further clots out.  There is only a 

slight red tinge of the urine out at this time.  Appears to be draining.), d/w 

patient





Departure





- Departure


Disposition: 01 Home, Self Care


Clinical Impression: 


Complication, blocked Pineda catheter


Qualifiers:


 Encounter type: initial encounter Qualified Code(s): T83.091A - Other 

mechanical complication of indwelling urethral catheter, initial encounter





Hematuria


Qualifiers:


 Hematuria type: gross Qualified Code(s): R31.0 - Gross hematuria





Condition: Stable


Record reviewed to determine appropriate education?: Yes


Follow-Up: 


Luis Neff MD [Primary Care Provider] - 


Comments: 


I would anticipate your catheter draining well now since there is less blood 

coming out to clog it.  Return if blocked catheter again later today or 

tomorrow.  Otherwise normal activity and medications.


Discharge Date/Time: 04/10/20 10:12

## 2020-04-30 ENCOUNTER — HOSPITAL ENCOUNTER (OUTPATIENT)
Dept: HOSPITAL 76 - EMS | Age: 75
End: 2020-04-30
Attending: SURGERY
Payer: MEDICARE

## 2020-04-30 DIAGNOSIS — R42: ICD-10-CM

## 2020-04-30 DIAGNOSIS — R53.1: Primary | ICD-10-CM

## 2020-05-03 ENCOUNTER — HOSPITAL ENCOUNTER (EMERGENCY)
Dept: HOSPITAL 76 - ED | Age: 75
Discharge: HOME | End: 2020-05-03
Payer: MEDICARE

## 2020-05-03 VITALS — SYSTOLIC BLOOD PRESSURE: 141 MMHG | DIASTOLIC BLOOD PRESSURE: 72 MMHG

## 2020-05-03 DIAGNOSIS — L03.113: Primary | ICD-10-CM

## 2020-05-03 DIAGNOSIS — F03.90: ICD-10-CM

## 2020-05-03 DIAGNOSIS — I10: ICD-10-CM

## 2020-05-03 PROCEDURE — 90471 IMMUNIZATION ADMIN: CPT

## 2020-05-03 PROCEDURE — 99283 EMERGENCY DEPT VISIT LOW MDM: CPT

## 2020-05-03 PROCEDURE — 99284 EMERGENCY DEPT VISIT MOD MDM: CPT

## 2020-05-03 NOTE — ED PHYSICIAN DOCUMENTATION
History of Present Illness





- Stated complaint


Stated Complaint: R HAND INJ/L SIDE WEAKNESS





- Chief complaint


Chief Complaint: Ext Problem





- History obtained from


History obtained from: Patient, Family





- History of Present Illness


Timing: How many days ago (3)


Pain level max: 4


Pain level now: 3





- Additonal information


Additional information: 





75-year-old male with a history of dementia presents to the emergency department

complaining of right hand redness and swelling for the past 3 days.  Nothing 

makes it better or worse.  Started after he spent the day gardening.  No fevers.

 Unknown last tetanus shot.  Wife also states that he had trouble holding a 

glass 3 days ago, symptoms have not recurred since then.  Does have a history of

left-sided stroke in the past.  He is left-handed.





Review of Systems


Unable to obtain: Dementia


Constitutional: denies: Fever, Chills


GI: denies: Vomiting


Neurologic: denies: Focal weakness, Numbness, Altered mental status, Headache





PD PAST MEDICAL HISTORY





- Past Medical History


Cardiovascular: Hypertension, High cholesterol, Atrial fibrillation


Respiratory: None


Neuro: Dementia, CVA


Endocrine/Autoimmune: None


GI: None


: Benign prostate hypertrophy, Retention, Incontinence, Indwelling catheter


Psych: Anxiety, Other


Musculoskeletal: Chronic back pain


Derm: None





- Past Surgical History


Past Surgical History: Yes


General: Splenectomy, Other


Ortho: Hip replacement, Knee replacement


HEENT: Tonsil/Adenoidectomy, Other





- Present Medications


Home Medications: 


                                Ambulatory Orders











 Medication  Instructions  Recorded  Confirmed


 


Dabigatran [Pradaxa] 150 mg PO BID 09/22/13 03/15/20


 


Finasteride 5 mg PO DAILY 03/14/20 03/15/20


 


Ciprofloxacin [Cipro] 500 mg PO BID #28 tablet 03/18/20 


 


polyethylene glycoL 3350 [Miralax] 17 gm PO DAILY PRN 04/02/20 04/02/20


 


Cephalexin [Keflex] 500 mg PO Q6H #28 capsule 05/03/20 


 


Sulfamethox/Trimeth 800/160 1 each PO BID #14 tablet 05/03/20 





[Bactrim Ds 800/160]   














- Allergies


Allergies/Adverse Reactions: 


                                    Allergies











Allergy/AdvReac Type Severity Reaction Status Date / Time


 


No Known Drug Allergies Allergy   Verified 04/10/20 07:11














- Social History


Does the pt smoke?: No


Smoking Status: Never smoker


Does the pt drink ETOH?: Yes


Does the pt have substance abuse?: No





- Immunizations


Immunizations are current?: Yes





- POLST


Patient has POLST: No


POLST Status: Full Code





PD ED PE NORMAL





- Vitals


Vital signs reviewed: Yes





- General


General: Alert and oriented X 3, No acute distress, Well developed/nourished





- HEENT


HEENT: Atraumatic, PERRL, EOMI, Moist mucous membranes





- Neck


Neck: Supple, no meningeal sign





- Cardiac


Cardiac: RRR





- Respiratory


Respiratory: No respiratory distress, Clear bilaterally





- Abdomen


Abdomen: Soft, Non tender, Non distended





- Back


Back: No spinal TTP





- Derm


Derm: Warm and dry





- Extremities


Extremities: Other (Erythema and mild swelling to the dorsum of the right hand 

extending up to the right wrist.  Neurovascular intact.  No palmar tenderness.  

No pain with range of motion of the fingers.  No tenderness along the tendon 

sheaths.  No drainable abscess.)





- Neuro


Neuro: Alert and oriented X 3, No motor deficit, No sensory deficit


Eye Opening: Spontaneous


Motor: Obeys Commands


Verbal: Oriented


GCS Score: 15





- Psych


Psych: Normal mood, Normal affect





- Free text exam


Free text exam: 





NIH stroke scale of 0 at 1530





Results





- Vitals


Vitals: 


                               Vital Signs - 24 hr











  05/03/20





  15:05


 


Temperature 36.6 C


 


Heart Rate 83


 


Respiratory 16





Rate 


 


Blood Pressure 141/72 H


 


O2 Saturation 98








                                     Oxygen











O2 Source [With Activity]      Room air


 


O2 Source                      Room air

















PD MEDICAL DECISION MAKING





- ED course


Complexity details: considered differential, d/w patient, d/w family


ED course: 





Patient with cellulitis of the right hand.  Will place him on antibiotics for 

this.  He had difficulty with his left hand a few days ago, unlikely to see much

on the CT scan today, recommend following up with his doctor for an MRI.  

Patient and family are comfortable with this plan.  Symptoms only lasted a few 

minutes.  Patient is well-appearing, nontoxic.  Afebrile.  Normal neuro exam 

here.  Patient and family counseled regarding signs and symptoms for which I 

believe and urgent re-evaluation would be necessary. Patient with good 

understanding of and agreement to plan and is comfortable going home at this 

time





This document was made in part using voice recognition software. While efforts 

are made to proofread this document, sound alike and grammatical errors may 

occur.





Departure





- Departure


Disposition: 01 Home, Self Care


Clinical Impression: 


Cellulitis


Qualifiers:


 Site of cellulitis: extremity Site of cellulitis of extremity: upper extremity 

Laterality: right Qualified Code(s): L03.113 - Cellulitis of right upper limb





Condition: Good


Instructions:  ED Infec Skin Cellulitis


Follow-Up: 


Luis Neff MD [Primary Care Provider] - Within 3 Days


Prescriptions: 


Cephalexin [Keflex] 500 mg PO Q6H #28 capsule


Sulfamethox/Trimeth 800/160 [Bactrim Ds 800/160] 1 each PO BID #14 tablet


Comments: 


Your prescriptions were sent to [facility] in [place].  Return if you worsen.  

Follow-up with your doctor in 3 days for wound check.  Return for increasing 

redness, swelling, fevers or any other new or worsening symptoms.

## 2020-06-10 ENCOUNTER — HOSPITAL ENCOUNTER (EMERGENCY)
Dept: HOSPITAL 76 - ED | Age: 75
Discharge: HOME | End: 2020-06-10
Payer: MEDICARE

## 2020-06-10 VITALS — DIASTOLIC BLOOD PRESSURE: 89 MMHG | SYSTOLIC BLOOD PRESSURE: 161 MMHG

## 2020-06-10 DIAGNOSIS — Z86.73: ICD-10-CM

## 2020-06-10 DIAGNOSIS — Z79.01: ICD-10-CM

## 2020-06-10 DIAGNOSIS — I10: ICD-10-CM

## 2020-06-10 DIAGNOSIS — R33.9: ICD-10-CM

## 2020-06-10 DIAGNOSIS — T83.511A: Primary | ICD-10-CM
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
## 2020-06-10 DIAGNOSIS — R00.1: ICD-10-CM

## 2020-06-10 DIAGNOSIS — N39.0: ICD-10-CM

## 2020-06-10 LAB
ALBUMIN DIAFP-MCNC: 4 G/DL (ref 3.2–5.5)
ALBUMIN/GLOB SERPL: 1.3 {RATIO} (ref 1–2.2)
ALP SERPL-CCNC: 48 IU/L (ref 42–121)
ALT SERPL W P-5'-P-CCNC: 14 IU/L (ref 10–60)
ANION GAP SERPL CALCULATED.4IONS-SCNC: 10 MMOL/L (ref 6–13)
AST SERPL W P-5'-P-CCNC: 21 IU/L (ref 10–42)
BASOPHILS NFR BLD AUTO: 0.1 10^3/UL (ref 0–0.1)
BASOPHILS NFR BLD AUTO: 0.8 %
BILIRUB BLD-MCNC: 0.9 MG/DL (ref 0.2–1)
BUN SERPL-MCNC: 16 MG/DL (ref 6–20)
CALCIUM UR-MCNC: 9.2 MG/DL (ref 8.5–10.3)
CHLORIDE SERPL-SCNC: 99 MMOL/L (ref 101–111)
CLARITY UR REFRACT.AUTO: (no result)
CO2 SERPL-SCNC: 28 MMOL/L (ref 21–32)
CREAT SERPLBLD-SCNC: 1 MG/DL (ref 0.6–1.2)
EOSINOPHIL # BLD AUTO: 0.2 10^3/UL (ref 0–0.7)
EOSINOPHIL NFR BLD AUTO: 1.1 %
ERYTHROCYTE [DISTWIDTH] IN BLOOD BY AUTOMATED COUNT: 14.2 % (ref 12–15)
GLOBULIN SER-MCNC: 3.1 G/DL (ref 2.1–4.2)
GLUCOSE SERPL-MCNC: 119 MG/DL (ref 70–100)
GLUCOSE UR QL STRIP.AUTO: NEGATIVE MG/DL
HGB UR QL STRIP: 14.7 G/DL (ref 14–18)
KETONES UR QL STRIP.AUTO: NEGATIVE MG/DL
LIPASE SERPL-CCNC: 37 U/L (ref 22–51)
LYMPHOCYTES # SPEC AUTO: 1.7 10^3/UL (ref 1.5–3.5)
LYMPHOCYTES NFR BLD AUTO: 11.8 %
MCH RBC QN AUTO: 32.6 PG (ref 27–31)
MCHC RBC AUTO-ENTMCNC: 32.5 G/DL (ref 32–36)
MCV RBC AUTO: 100.4 FL (ref 80–94)
MONOCYTES # BLD AUTO: 1.3 10^3/UL (ref 0–1)
MONOCYTES NFR BLD AUTO: 8.6 %
NEUTROPHILS # BLD AUTO: 11.2 10^3/UL (ref 1.5–6.6)
NEUTROPHILS # SNV AUTO: 14.5 X10^3/UL (ref 4.8–10.8)
NEUTROPHILS NFR BLD AUTO: 77.2 %
NITRITE UR QL STRIP.AUTO: NEGATIVE
PDW BLD AUTO: 10.9 FL (ref 7.4–11.4)
PH UR STRIP.AUTO: 6 PH (ref 5–7.5)
PLATELET # BLD: 236 10^3/UL (ref 130–450)
PROT SPEC-MCNC: 7.1 G/DL (ref 6.7–8.2)
PROT UR STRIP.AUTO-MCNC: (no result) MG/DL
RBC # UR STRIP.AUTO: (no result) /UL
RBC # URNS HPF: (no result) /HPF (ref 0–5)
RBC MAR: 4.51 10^6/UL (ref 4.7–6.1)
SODIUM SERPLBLD-SCNC: 137 MMOL/L (ref 135–145)
SP GR UR STRIP.AUTO: 1.01 (ref 1–1.03)
SQUAMOUS URNS QL MICRO: (no result)
UROBILINOGEN UR QL STRIP.AUTO: (no result) E.U./DL
UROBILINOGEN UR STRIP.AUTO-MCNC: NEGATIVE MG/DL
WBC CLUMPS URNS QL MICRO: PRESENT

## 2020-06-10 PROCEDURE — 81001 URINALYSIS AUTO W/SCOPE: CPT

## 2020-06-10 PROCEDURE — 99284 EMERGENCY DEPT VISIT MOD MDM: CPT

## 2020-06-10 PROCEDURE — 87181 SC STD AGAR DILUTION PER AGT: CPT

## 2020-06-10 PROCEDURE — 96372 THER/PROPH/DIAG INJ SC/IM: CPT

## 2020-06-10 PROCEDURE — 36415 COLL VENOUS BLD VENIPUNCTURE: CPT

## 2020-06-10 PROCEDURE — 51798 US URINE CAPACITY MEASURE: CPT

## 2020-06-10 PROCEDURE — 81003 URINALYSIS AUTO W/O SCOPE: CPT

## 2020-06-10 PROCEDURE — 85025 COMPLETE CBC W/AUTO DIFF WBC: CPT

## 2020-06-10 PROCEDURE — 83690 ASSAY OF LIPASE: CPT

## 2020-06-10 PROCEDURE — 87086 URINE CULTURE/COLONY COUNT: CPT

## 2020-06-10 PROCEDURE — 99283 EMERGENCY DEPT VISIT LOW MDM: CPT

## 2020-06-10 PROCEDURE — 80053 COMPREHEN METABOLIC PANEL: CPT

## 2020-06-10 NOTE — ED PHYSICIAN DOCUMENTATION
PD HPI MALE 





- Stated complaint


Stated Complaint: M 





- Chief complaint


Chief Complaint: Abd Pain





- History obtained from


History obtained from: Patient, Family





- History of Present Illness


Timing - onset: Today


Timing - duration: Hours


Timing - details: Abrupt onset, Still present


Associated symptoms: Unable to urinate, Pineda problem


PD HPI MALE  CONTRIB FACTORS: Indwelling catheter


Similar symptoms before: Diagnosis (urinary retention)


Recently seen: Admitted





- Additional information


Additional information: 





75-year-old male who previously was self cathing has had an indwelling Pineda 

catheter over the past month after a prior infection.  Today the home health 

nurse came in to change out the catheter and this is not part properly draining 

now.  The catheter was readjusted a small amount of drainage came out the 

patient has a distended bladder and discomfort related to this.  He has not had 

fever or vomiting associated with this.





Review of Systems


Constitutional: denies: Fever


Eyes: denies: Decreased vision


Ears: denies: Ear pain


Nose: denies: Congestion


Throat: denies: Sore throat


Cardiac: denies: Chest pain / pressure


Respiratory: denies: Dyspnea, Cough


GI: denies: Abdominal Pain, Nausea, Vomiting


: reports: Unable to Void, Pineda Problem.  denies: Dysuria, Frequency





PD PAST MEDICAL HISTORY





- Past Medical History


Cardiovascular: Hypertension, High cholesterol, Atrial fibrillation


Respiratory: None


Neuro: Dementia, CVA


Endocrine/Autoimmune: None


GI: None


: Benign prostate hypertrophy, Retention, Incontinence, Indwelling catheter


Psych: Anxiety, Other


Musculoskeletal: Chronic back pain


Derm: None





- Past Surgical History


Past Surgical History: Yes


General: Splenectomy, Other


Ortho: Hip replacement, Knee replacement


HEENT: Tonsil/Adenoidectomy, Other





- Present Medications


Home Medications: 


                                Ambulatory Orders











 Medication  Instructions  Recorded  Confirmed


 


Dabigatran [Pradaxa] 150 mg PO BID 09/22/13 03/15/20


 


Finasteride 5 mg PO DAILY 03/14/20 03/15/20


 


Ciprofloxacin [Cipro] 500 mg PO BID #28 tablet 03/18/20 


 


polyethylene glycoL 3350 [Miralax] 17 gm PO DAILY PRN 04/02/20 04/02/20


 


Cephalexin [Keflex] 500 mg PO Q6H #28 capsule 05/03/20 


 


Sulfamethox/Trimeth 800/160 1 each PO BID #14 tablet 05/03/20 





[Bactrim Ds 800/160]   


 


Nitrofurantoin Monohyd/M-Cryst 100 mg PO BID #14 capsule 06/10/20 





[Macrobid 100 mg Capsule]   














- Allergies


Allergies/Adverse Reactions: 


                                    Allergies











Allergy/AdvReac Type Severity Reaction Status Date / Time


 


No Known Drug Allergies Allergy   Verified 06/10/20 15:01














- Social History


Does the pt smoke?: No


Smoking Status: Never smoker


Does the pt drink ETOH?: Yes


Does the pt have substance abuse?: No





- Immunizations


Immunizations are current?: Yes





- POLST


Patient has POLST: No


POLST Status: Full Code





PD ED PE NORMAL





- Vitals


Vital signs reviewed: Yes (hypertensive and bradycardic)





- General


General: Alert and oriented X 3, No acute distress, Well developed/nourished





- HEENT


HEENT: Atraumatic, PERRL, EOMI





- Respiratory


Respiratory: No respiratory distress





- Abdomen


Abdomen: Soft, Other (There is suprapubic tenderness that is severe and there is

a palpable bladder below the umbilicus.)





- Back


Back: No CVA TTP, No spinal TTP





- Derm


Derm: Normal color, Warm and dry, No rash





- Extremities


Extremities: No deformity, No edema





- Neuro


Neuro: Alert and oriented X 3, CNs 2-12 intact, No motor deficit, No sensory 

deficit, Normal speech


Eye Opening: Spontaneous


Motor: Obeys Commands


Verbal: Oriented


GCS Score: 15





- Psych


Psych: Normal mood, Normal affect





Results





- Vitals


Vitals: 


                               Vital Signs - 24 hr











  06/10/20





  14:54


 


Temperature 98.5 C H


 


Heart Rate 46 L


 


Respiratory 16





Rate 


 


Blood Pressure 188/130 H


 


O2 Saturation 93








                                     Oxygen











O2 Source [With Activity]      Room air


 


O2 Source                      Room air

















- Labs


Labs: 


                                Laboratory Tests











  06/10/20 06/10/20 06/10/20





  15:48 15:48 15:55


 


WBC  14.5 H  


 


RBC  4.51 L  


 


Hgb  14.7  


 


Hct  45.3  


 


MCV  100.4 H  


 


MCH  32.6 H  


 


MCHC  32.5  


 


RDW  14.2  


 


Plt Count  236  


 


MPV  10.9  


 


Neut # (Auto)  11.2 H  


 


Lymph # (Auto)  1.7  


 


Mono # (Auto)  1.3 H  


 


Eos # (Auto)  0.2  


 


Baso # (Auto)  0.1  


 


Absolute Nucleated RBC  0.00  


 


Nucleated RBC %  0.0  


 


Sodium   137 


 


Potassium   3.7 


 


Chloride   99 L 


 


Carbon Dioxide   28 


 


Anion Gap   10.0 


 


BUN   16 


 


Creatinine   1.0 


 


Estimated GFR (MDRD)   72 L 


 


Glucose   119 H 


 


Calcium   9.2 


 


Total Bilirubin   0.9 


 


AST   21 


 


ALT   14 


 


Alkaline Phosphatase   48 


 


Total Protein   7.1 


 


Albumin   4.0 


 


Globulin   3.1 


 


Albumin/Globulin Ratio   1.3 


 


Lipase   37 


 


Urine Color    YELLOW


 


Urine Clarity    CLOUDY


 


Urine pH    6.0


 


Ur Specific Gravity    1.010


 


Urine Protein    TRACE


 


Urine Glucose (UA)    NEGATIVE


 


Urine Ketones    NEGATIVE


 


Urine Occult Blood    LARGE H


 


Urine Nitrite    NEGATIVE


 


Urine Bilirubin    NEGATIVE


 


Urine Urobilinogen    0.2 (NORMAL)


 


Ur Leukocyte Esterase    LARGE H


 


Urine RBC    6-10 H


 


Urine WBC    >25 H


 


Urine WBC Clumps    PRESENT


 


Ur Squamous Epith Cells    NONE SEEN


 


Urine Bacteria    Moderate H


 


Ur Microscopic Review    INDICATED


 


Urine Culture Comments    INDICATED














PD MEDICAL DECISION MAKING





- ED course


Complexity details: reviewed old records, reviewed results, re-evaluated 

patient, considered differential, d/w patient, d/w family


ED course: 





75-year-old male with a recently indwelling Pineda catheter had his catheter 

changed today by the visiting home health nurse and following that he developed 

urinary retention.  Here in the emergency department the RN has removed the 

prior catheter placed a new catheter and she is irrigated debris from the 

bladder.  The patient has evidence of infection and today we will treat.  I have

discussed with the patient and his wife the eventual colonization of his 

catheter and at some point treating what appears to be infection will not be 

indicated





Departure





- Departure


Disposition: 01 Home, Self Care


Clinical Impression: 


 Urinary retention





Urinary tract infection


Qualifiers:


 Urinary tract infection type: catheter-associated UTI Indwelling urinary 

catheter type: indwelling urethral catheter Encounter type: initial encounter 

Qualified Code(s): T83.511A - Infection and inflammatory reaction due to 

indwelling urethral catheter, initial encounter; N39.0 - Urinary tract 

infection, site not specified





Condition: Stable


Instructions:  ED UTI Cystitis Male


Follow-Up: 


Luis Neff MD [Primary Care Provider] - 


Prescriptions: 


Nitrofurantoin Monohyd/M-Cryst [Macrobid 100 mg Capsule] 100 mg PO BID #14 

capsule

## 2020-09-11 ENCOUNTER — HOSPITAL ENCOUNTER (INPATIENT)
Dept: HOSPITAL 76 - ED | Age: 75
LOS: 4 days | Discharge: HOME HEALTH | DRG: 698 | End: 2020-09-15
Attending: SPECIALIST | Admitting: INTERNAL MEDICINE
Payer: MEDICARE

## 2020-09-11 DIAGNOSIS — E78.00: ICD-10-CM

## 2020-09-11 DIAGNOSIS — N39.0: ICD-10-CM

## 2020-09-11 DIAGNOSIS — F03.91: ICD-10-CM

## 2020-09-11 DIAGNOSIS — I48.20: ICD-10-CM

## 2020-09-11 DIAGNOSIS — N43.40: ICD-10-CM

## 2020-09-11 DIAGNOSIS — A41.51: ICD-10-CM

## 2020-09-11 DIAGNOSIS — T83.091A: ICD-10-CM

## 2020-09-11 DIAGNOSIS — T83.518A: Primary | ICD-10-CM

## 2020-09-11 DIAGNOSIS — Z79.01: ICD-10-CM

## 2020-09-11 DIAGNOSIS — R26.2: ICD-10-CM

## 2020-09-11 DIAGNOSIS — N13.8: ICD-10-CM

## 2020-09-11 DIAGNOSIS — R65.20: ICD-10-CM

## 2020-09-11 DIAGNOSIS — A41.9: ICD-10-CM

## 2020-09-11 DIAGNOSIS — R33.8: ICD-10-CM

## 2020-09-11 DIAGNOSIS — I69.919: ICD-10-CM

## 2020-09-11 DIAGNOSIS — Z87.891: ICD-10-CM

## 2020-09-11 DIAGNOSIS — N30.91: ICD-10-CM

## 2020-09-11 DIAGNOSIS — R32: ICD-10-CM

## 2020-09-11 DIAGNOSIS — Z79.2: ICD-10-CM

## 2020-09-11 DIAGNOSIS — N40.1: ICD-10-CM

## 2020-09-11 DIAGNOSIS — Z96.659: ICD-10-CM

## 2020-09-11 DIAGNOSIS — Z87.440: ICD-10-CM

## 2020-09-11 DIAGNOSIS — Z91.81: ICD-10-CM

## 2020-09-11 DIAGNOSIS — E87.2: ICD-10-CM

## 2020-09-11 DIAGNOSIS — I10: ICD-10-CM

## 2020-09-11 DIAGNOSIS — N31.9: ICD-10-CM

## 2020-09-11 DIAGNOSIS — I69.998: ICD-10-CM

## 2020-09-11 DIAGNOSIS — Z96.649: ICD-10-CM

## 2020-09-11 LAB
ALBUMIN DIAFP-MCNC: 3.9 G/DL (ref 3.2–5.5)
ALBUMIN/GLOB SERPL: 1.2 {RATIO} (ref 1–2.2)
ALP SERPL-CCNC: 53 IU/L (ref 42–121)
ALT SERPL W P-5'-P-CCNC: 16 IU/L (ref 10–60)
ANION GAP SERPL CALCULATED.4IONS-SCNC: 11 MMOL/L (ref 6–13)
AST SERPL W P-5'-P-CCNC: 25 IU/L (ref 10–42)
BASOPHILS # BLD MANUAL: 0 10^3/UL (ref 0–0.1)
BASOPHILS NFR BLD AUTO: 0.5 %
BILIRUB BLD-MCNC: 0.7 MG/DL (ref 0.2–1)
BUN SERPL-MCNC: 17 MG/DL (ref 6–20)
CALCIUM UR-MCNC: 9.4 MG/DL (ref 8.5–10.3)
CHLORIDE SERPL-SCNC: 103 MMOL/L (ref 101–111)
CLARITY UR REFRACT.AUTO: (no result)
CO2 SERPL-SCNC: 28 MMOL/L (ref 21–32)
CREAT SERPLBLD-SCNC: 1.1 MG/DL (ref 0.6–1.2)
EOSINOPHIL # BLD MANUAL: 0.4 10^3/UL (ref 0–0.7)
EOSINOPHIL NFR BLD AUTO: 1.6 %
ERYTHROCYTE [DISTWIDTH] IN BLOOD BY AUTOMATED COUNT: 13.3 % (ref 12–15)
GLOBULIN SER-MCNC: 3.2 G/DL (ref 2.1–4.2)
GLUCOSE SERPL-MCNC: 86 MG/DL (ref 70–100)
GLUCOSE UR QL STRIP.AUTO: NEGATIVE MG/DL
HGB UR QL STRIP: 14.9 G/DL (ref 14–18)
KETONES UR QL STRIP.AUTO: NEGATIVE MG/DL
LIPASE SERPL-CCNC: 39 U/L (ref 22–51)
LYMPH ABN NFR BLD MANUAL: 0 %
LYMPHOBLASTS # BLD: 9 %
LYMPHOCYTES # BLD MANUAL: 1 10^3/UL (ref 1.5–3.5)
LYMPHOCYTES NFR BLD AUTO: 10.5 %
MANUAL DIF COMMENT BLD-IMP: (no result)
MCH RBC QN AUTO: 31.9 PG (ref 27–31)
MCHC RBC AUTO-ENTMCNC: 32.2 G/DL (ref 32–36)
MCV RBC AUTO: 99.1 FL (ref 80–94)
MONOCYTES # BLD MANUAL: 0 10^3/UL (ref 0–1)
MONOCYTES NFR BLD AUTO: 0.5 %
NEUTROPHILS # SNV AUTO: 10.6 X10^3/UL (ref 4.8–10.8)
NEUTROPHILS NFR BLD AUTO: 86.3 %
NEUTS BAND NFR BLD: 23 %
NITRITE UR QL STRIP.AUTO: NEGATIVE
PDW BLD AUTO: 10.4 FL (ref 7.4–11.4)
PH UR STRIP.AUTO: 6 PH (ref 5–7.5)
PLAT MORPH BLD: (no result)
PLATELET # BLD: 215 10^3/UL (ref 130–450)
PLATELET BLD QL SMEAR: (no result)
PROT SPEC-MCNC: 7.1 G/DL (ref 6.7–8.2)
PROT UR STRIP.AUTO-MCNC: NEGATIVE MG/DL
RBC # UR STRIP.AUTO: (no result) /UL
RBC MAR: 4.67 10^6/UL (ref 4.7–6.1)
RBC MORPH BLD: (no result)
SODIUM SERPLBLD-SCNC: 142 MMOL/L (ref 135–145)
SP GR UR STRIP.AUTO: 1.01 (ref 1–1.03)
SQUAMOUS URNS QL MICRO: (no result)
UROBILINOGEN UR QL STRIP.AUTO: (no result) E.U./DL
UROBILINOGEN UR STRIP.AUTO-MCNC: NEGATIVE MG/DL

## 2020-09-11 PROCEDURE — 87040 BLOOD CULTURE FOR BACTERIA: CPT

## 2020-09-11 PROCEDURE — 96361 HYDRATE IV INFUSION ADD-ON: CPT

## 2020-09-11 PROCEDURE — 87077 CULTURE AEROBIC IDENTIFY: CPT

## 2020-09-11 PROCEDURE — 36415 COLL VENOUS BLD VENIPUNCTURE: CPT

## 2020-09-11 PROCEDURE — 83735 ASSAY OF MAGNESIUM: CPT

## 2020-09-11 PROCEDURE — 97530 THERAPEUTIC ACTIVITIES: CPT

## 2020-09-11 PROCEDURE — 97161 PT EVAL LOW COMPLEX 20 MIN: CPT

## 2020-09-11 PROCEDURE — 51702 INSERT TEMP BLADDER CATH: CPT

## 2020-09-11 PROCEDURE — 96375 TX/PRO/DX INJ NEW DRUG ADDON: CPT

## 2020-09-11 PROCEDURE — 87181 SC STD AGAR DILUTION PER AGT: CPT

## 2020-09-11 PROCEDURE — 74176 CT ABD & PELVIS W/O CONTRAST: CPT

## 2020-09-11 PROCEDURE — 97116 GAIT TRAINING THERAPY: CPT

## 2020-09-11 PROCEDURE — 80053 COMPREHEN METABOLIC PANEL: CPT

## 2020-09-11 PROCEDURE — 83605 ASSAY OF LACTIC ACID: CPT

## 2020-09-11 PROCEDURE — 97165 OT EVAL LOW COMPLEX 30 MIN: CPT

## 2020-09-11 PROCEDURE — 99291 CRITICAL CARE FIRST HOUR: CPT

## 2020-09-11 PROCEDURE — 85025 COMPLETE CBC W/AUTO DIFF WBC: CPT

## 2020-09-11 PROCEDURE — 76870 US EXAM SCROTUM: CPT

## 2020-09-11 PROCEDURE — 96365 THER/PROPH/DIAG IV INF INIT: CPT

## 2020-09-11 PROCEDURE — 80048 BASIC METABOLIC PNL TOTAL CA: CPT

## 2020-09-11 PROCEDURE — 81003 URINALYSIS AUTO W/O SCOPE: CPT

## 2020-09-11 PROCEDURE — 87086 URINE CULTURE/COLONY COUNT: CPT

## 2020-09-11 PROCEDURE — 83690 ASSAY OF LIPASE: CPT

## 2020-09-11 PROCEDURE — 81001 URINALYSIS AUTO W/SCOPE: CPT

## 2020-09-11 RX ADMIN — ACETAMINOPHEN PRN MG: 325 TABLET ORAL at 22:19

## 2020-09-11 RX ADMIN — SODIUM CHLORIDE SCH MLS/HR: 9 INJECTION, SOLUTION INTRAVENOUS at 22:14

## 2020-09-11 NOTE — CT REPORT
PROCEDURE:  Abdomen/Pelvis WO

 

INDICATIONS:  back pain, uti

 

TECHNIQUE:  

Noncontrast 5 mm thick sections acquired from the diaphragms to the symphysis.  5 mm coronal and sagi
ttal reformats were then performed.  For radiation dose reduction, the following was used:  automated
 exposure control, adjustment of mA and/or kV according to patient size.

 

COMPARISON:  None.

 

FINDINGS:  

Image quality: Adequate. There is respiratory motion at the lung bases and suboptimal positioning of 
the patient's arms..  

 

ABDOMEN:  

Lung bases: No dense consolidations at the lung bases.  Heart size is mildly enlarged.  There is a sm
all paraesophageal hiatal hernia.

 

Solid organs: There has been a splenectomy and redevelopment of multiple small soft tissue masses in 
the high left upper quadrant consistent with splenosis. The unenhanced appearance of the liver is nor
mal. Gallbladder is within normal limits  Pancreas is normal in contours.  No adrenal nodules.  Kidne
ys are normal in size, without hydronephrosis or nephrolithiasis.  There is a small cortical cyst in 
the upper pole of each kidney. Mild, symmetric perinephric inflammation.

 

Peritoneum and bowel:  Unenhanced bowel loops demonstrate normal wall thickness and caliber.  No free
 fluid or air.  Occasional sigmoid diverticulosis.

 

Nodes and vessels:  No retroperitoneal or mesenteric adenopathy by size criteria.  Aorta and inferior
 vena cava are normal in caliber.  Mild abdominal aortic calcification.

 

Miscellaneous: Tiny fat-containing umbilical hernia..  

 

 

PELVIS:  

Genitourinary: The urinary bladder is partially decompressed with Pineda catheter and there is a small
 amount of air anteriorly. The wall demonstrates diffuse thickening and mild perivesicular inflammati
on. Posterior and to the right of midline from the urinary bladder is unencapsulated, slightly irregu
lar thin walled fluid collection measuring roughly 5.6 x 4.8 x 3.6 cm which contains a small amount o
f nondependent gas. This may be a bladder diverticulum, though this is not well assessed secondary to
 beam hardening artifact from the left hip arthroplasty. No definite stones in the urinary bladder or
 in the possible bladder diverticulum. The prostate gland may be enlarged.

 

Miscellaneous:  No inguinal hernias or adenopathy.  

 

Bones:  No suspicious bony lesions.  No vertebral body compression fractures.  Degenerative disc and 
endplate changes at L4-5 and L5-S1.

 

IMPRESSION:  

 

1. There is bladder wall thickening and perivesicular inflammation consistent with cystitis and poten
tially superimposed on chronic outlet obstruction.

2. Possible 5.6 cm bladder diverticulum may predispose the patient to urinary stasis though no stones
 are evident.

3. No CT evidence of obstructive uropathy in the kidneys. Without IV contrast, pyelonephritis cannot 
be excluded.

4. Small periesophageal hiatal hernia.

5. Post splenectomy with splenosis.

 

Reviewed by: Charo Perez MD on 9/11/2020 8:04 PM PDT

Approved by: Charo Perez MD on 9/11/2020 8:04 PM PDT

 

 

Station ID:  [id number]

## 2020-09-11 NOTE — HISTORY & PHYSICAL EXAMINATION
History of Present Illness





- Admitted From


Admitted From:: [facility] ED





- History Obtained From


Records Reviewed: Yes


History obtained from: ED physician and notes





- History of Present Illness


HPI Comment/Other: 





Patient is a 75-year-old male with history of a Atrial fibrillation on pradaxa, 

CVA and cognitive deficit as a result.  He presented to the ED because his Pineda

catheter was replaced after which he developed gross hematuria.  The catheter 

subsequently became obstructed and the patient started experiencing some 

suprapubic pain.  During evaluation in the ED he was noted to have chills and a 

fever with a temperature as high as 38.6C.  He had about 24% bands on his CBC 

and a lactic acid of 4.3


Consequently he was presented for admission for treatment of sepsis due to UTI. 

The patient was last admitted in 2020 with a UTI


At bedside patient is alert and awake but not oriented to place time or reason. 

He denies chest pain, dyspnea, abdominal pain, nausea, vomiting.  He has 

significant scrotal edema on exam.  Pineda catheter is in place draining urine 

which appears clear but with some reddish sedimentation in the tubing.


Attempts to reach his wife for more information regarding his presentation has 

been unsuccessful.  A message was left on the voicemail to call back.  The 

history above was mainly obtained from the ED physician and the ED physicians 

documentation.





History





- Past Medical History


Cardiovascular: reports: Hypertension, High cholesterol, Atrial fibrillation


Respiratory: reports: None


Neuro: reports: Dementia, CVA


Endocrine/Autoimmune: reports: None


GI: reports: None


: reports: Benign prostate hypertrophy, Retention, Incontinence, Indwelling 

catheter


Psych: reports: Anxiety, Other


Musculoskeletal: reports: Chronic back pain


Derm: reports: None


MRSA Hx?: No





- Past Surgical History


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


Ortho: reports: Hip replacement, Knee replacement


HEENT: reports: Tonsil/Adenoidectomy, Other





- Family & Social History


Family History: Mother: , CAD, Diabetes, Type 2, Father: , 

Alzheimer's Disease


Family History Comment/Other: father: dementia.  at age 93.  mother: 

Diabetes mellitus and CHF


Social History Notes: 30+ smoking history. Quit in .  Rarely drinks alcohol.

No illicit drugs





- POLST


Patient has POLST: No


POLST Status: Full Code





Meds/Allgy





- Home Medications


Home Medications: 


                                Ambulatory Orders











 Medication  Instructions  Recorded  Confirmed


 


Dabigatran [Pradaxa] 150 mg PO BID 09/22/13 03/15/20


 


Finasteride 5 mg PO DAILY 03/14/20 03/15/20


 


Ciprofloxacin [Cipro] 500 mg PO BID #28 tablet 20 


 


polyethylene glycoL 3350 [Miralax] 17 gm PO DAILY PRN 20


 


Cephalexin [Keflex] 500 mg PO Q6H #28 capsule 20 


 


Sulfamethox/Trimeth 800/160 1 each PO BID #14 tablet 20 





[Bactrim Ds 800/160]   


 


Nitrofurantoin Monohyd/M-Cryst 100 mg PO BID #14 capsule 06/10/20 





[Macrobid 100 mg Capsule]   














- Allergies


Allergies/Adverse Reactions: 


                                    Allergies











Allergy/AdvReac Type Severity Reaction Status Date / Time


 


No Known Drug Allergies Allergy   Verified 06/10/20 15:01














Review of Systems





- Constitutional


Constitutional: reports: Fever, Chills.  denies: Fatigue





- Eyes


Eyes: denies: Pain





- Ears, Nose & Throat


Ears, Nose & Throat: denies: Ear pain





- Cardiovascular


Cariovascular: denies: Irregular heart rate, Palpitations, Chest pain, Edema, 

Lightheadedness, Syncope





- Respiratory


Respiratory: denies: Cough, Sputum production, Wheezing





- Gastrointestinal


Gastrointestinal: denies: Abdominal pain, Abdominal distention, Black stools, 

Nausea, Vomiting





- Genitourinary


Genitourinary: reports: Hematuria, Flank pain





- Musculoskeletal


Musculoskeletal: denies: Muscle pain, Back pain, Muscle aches





- Integumentary


Integumentary: denies: Rash, Pruritis, Lesions





- Neurological


Neurological: denies: Focal weakness, Headache, Dizziness


Prior Level of Functionality: 





Current to previous records, he lives at home with his wife.  He has some 

cognitive impairment since his stroke.  He is fixated on urinating and wakes up 

every 45 minutes at night to do so, even when told that it is not necessary.  He

is not very stable on his feet.





Exam





- Vital Signs


Vital Signs: 





                                Vital Signs x48h











  Temp Pulse Resp BP Pulse Ox


 


 20 20:16  37.9 C H  105 H  18  148/72 H  98


 


 20 19:37  37.9 C H  96  19  135/72 H  98


 


 20 19:12  99.1 C H  104 H  20  119/55 L  96


 


 20 18:42  99.7 C H  114 H  23  119/55 L  95


 


 20 17:28   75  16  178/105 H  100


 


 20 17:10  38.6 C H  91  18  186/97 H  98














- Physical Exam


General Appearance: positive: Mild distress, Moderate distress


Eyes Bilateral: positive: PERRL, EOMI


ENT: positive: Dry mucous membranes


Neck: positive: No JVD, Trachea midline


Respiratory: positive: Chest non-tender, No respiratory distress, Breath sounds 

nml.  negative: Wheezes, Rales, Rhonchi


Cardiovascular: positive: Regular rate & rhythm, No murmur


Abdomen: positive: Non-tender, No organomegaly, Nml bowel sounds, No distention.

 negative: Guarding, Rebound


Skin: positive: Color nml, No rash, Warm, Dry


Extremities: positive: Non-tender, Full ROM, Nml appearance, No pedal edema


Neurologic/Psychiatric: positive: Mood/affect nml.  negative: Oriented x3


Comments/Other: 





Scrotal edema





Sepsis Event Note (H)





- Evaluation


Current Stage of Sepsis: Sepsis


Possible source of Sepsis: positive: Genitourinary





- Sepsis Criteria


Sepsis Criteria: Recorded Temperature greater than 38.3C or Less than 36C, 

Recorded Heart Rate greater than 90 bpm, WBC count greater than 10% bands, 

Metabolic: lactate > 2 mmol/L





Conclusion/Plan





- Problem List


(1) Sepsis


Conclusion/Plan: 


Secondary to UTI.


Blocked catheter was changed.


Urine and blood cultures were collected.


Patient was started on Rocephin.  Will continue.


IV hydration with normal saline at 100 mils an hour.


Tylenol for fever prn


Qualifiers: 


   Sepsis type: sepsis due to unspecified organism   Sepsis acute organ 

dysfunction status: without acute organ dysfunction   Qualified Code(s): A41.9 -

Sepsis, unspecified organism   





(2) Urinary tract infection


Conclusion/Plan: 


Blocked catheter was changed.


Urine and blood cultures were collected.


Patient was started on Rocephin.  Will continue.


IV hydration with normal saline at 100 mils an hour.


Tylenol for fever prn


Qualifiers: 


   Urinary tract infection type: catheter-associated UTI   Indwelling urinary 

catheter type: indwelling urethral catheter   Encounter type: initial encounter 

 Qualified Code(s): T83.511A - Infection and inflammatory reaction due to 

indwelling urethral catheter, initial encounter; N39.0 - Urinary tract infec

tion, site not specified   





(3) Chronic atrial fibrillation


Conclusion/Plan: 


On Pradaxa.


Not on any rate controlling medication








(4) BPH (benign prostatic hyperplasia)


Conclusion/Plan: 


On finasteride.











(5) Scrotal swelling


Conclusion/Plan: 


Testicular ultrasound pending








- Lab Results


Fish Bones: 


                                 20 18:15





                                 20 18:15

## 2020-09-11 NOTE — ED PHYSICIAN DOCUMENTATION
PD HPI MALE 





- Stated complaint


Stated Complaint: MALE 





- Chief complaint


Chief Complaint: UTI





- History obtained from


History obtained from: Patient, Family





- Additional information


Additional information: 





75-year-old gentleman had his Pineda catheter replaced today and subsequently 

developed gross hematuria and now it is obstructed.


Suprapubic pain, during his evaluation he developed chills, fever, and left 

flank pain as well.





Review of Systems


Ten Systems: 10 systems reviewed and negative


Constitutional: reports: Chills


GI: reports: Abdominal Pain


: reports: Unable to Void, Hematuria





PD PAST MEDICAL HISTORY





- Past Medical History


Cardiovascular: Hypertension, High cholesterol, Atrial fibrillation


Respiratory: None


Neuro: Dementia, CVA


Endocrine/Autoimmune: None


GI: None


: Benign prostate hypertrophy, Retention, Incontinence, Indwelling catheter


Psych: Anxiety, Other


Musculoskeletal: Chronic back pain


Derm: None





- Past Surgical History


Past Surgical History: Yes


General: Splenectomy, Other


Ortho: Hip replacement, Knee replacement


HEENT: Tonsil/Adenoidectomy, Other





- Present Medications


Home Medications: 


                                Ambulatory Orders











 Medication  Instructions  Recorded  Confirmed


 


Dabigatran [Pradaxa] 150 mg PO BID 09/22/13 03/15/20


 


Finasteride 5 mg PO DAILY 03/14/20 03/15/20


 


Ciprofloxacin [Cipro] 500 mg PO BID #28 tablet 03/18/20 


 


polyethylene glycoL 3350 [Miralax] 17 gm PO DAILY PRN 04/02/20 04/02/20


 


Cephalexin [Keflex] 500 mg PO Q6H #28 capsule 05/03/20 


 


Sulfamethox/Trimeth 800/160 1 each PO BID #14 tablet 05/03/20 





[Bactrim Ds 800/160]   


 


Nitrofurantoin Monohyd/M-Cryst 100 mg PO BID #14 capsule 06/10/20 





[Macrobid 100 mg Capsule]   














- Allergies


Allergies/Adverse Reactions: 


                                    Allergies











Allergy/AdvReac Type Severity Reaction Status Date / Time


 


No Known Drug Allergies Allergy   Verified 06/10/20 15:01














- Social History


Does the pt smoke?: No


Smoking Status: Never smoker


Does the pt drink ETOH?: Yes


Does the pt have substance abuse?: No





- Immunizations


Immunizations are current?: Yes





- POLST


Patient has POLST: No


POLST Status: Full Code





PD ED PE NORMAL





- Vitals


Vital signs reviewed: Yes





- General


General: Alert and oriented X 3, No acute distress





- Respiratory


Respiratory: No respiratory distress, Clear bilaterally





- Abdomen


Abdomen: Normal bowel sounds, Soft, Other (Not Much in the leg bag, what is 

there is bloody, the suprapubic area is full and tender.)





Results





- Vitals


Vitals: 


                               Vital Signs - 24 hr











  09/11/20 09/11/20 09/11/20





  17:10 17:28 18:42


 


Temperature 38.6 C H  99.7 C H


 


Heart Rate 91 75 114 H


 


Respiratory 18 16 23





Rate   


 


Blood Pressure 186/97 H 178/105 H 119/55 L


 


O2 Saturation 98 100 95














  09/11/20 09/11/20 09/11/20





  19:12 19:37 20:16


 


Temperature 99.1 C H 37.9 C H 37.9 C H


 


Heart Rate 104 H 96 105 H


 


Respiratory 20 19 18





Rate   


 


Blood Pressure 119/55 L 135/72 H 148/72 H


 


O2 Saturation 96 98 98








                                     Oxygen











O2 Source [With Activity]      Room air


 


O2 Source                      Room air

















- Labs


Labs: 


                                Laboratory Tests











  09/11/20 09/11/20 09/11/20





  18:15 18:15 18:15


 


WBC  10.6  


 


RBC  4.67 L  


 


Hgb  14.9  


 


Hct  46.3  


 


MCV  99.1 H  


 


MCH  31.9 H  


 


MCHC  32.2  


 


RDW  13.3  


 


Plt Count  215  


 


MPV  10.4  


 


Neut # (Auto)  Not Reportable  


 


Lymph # (Auto)  Not Reportable  


 


Mono # (Auto)  Not Reportable  


 


Eos # (Auto)  Not Reportable  


 


Baso # (Auto)  Not Reportable  


 


Absolute Nucleated RBC  Not Reportable  


 


Total Counted  100  


 


Band Neuts % (Manual)  23 H  


 


Abnorm Lymph % (Manual)  0  


 


Nucleated RBC %  Not Reportable  


 


Neutrophils # (Manual)  9.2 H  


 


Lymphocytes # (Manual)  1.0 L  


 


Monocytes # (Manual)  0.0  


 


Eosinophils # (Manual)  0.4  


 


Basophils # (Manual)  0.0  


 


Differential Comment  MANUAL DIFFERENTIAL  


 


Platelet Estimate  NORMAL (130-450,000)  


 


Platelet Morphology  NORMAL APPEARANCE  


 


RBC Morph Micro Appear  NORMAL APPEARANCE  


 


Sodium   142 


 


Potassium   4.0 


 


Chloride   103 


 


Carbon Dioxide   28 


 


Anion Gap   11.0 


 


BUN   17 


 


Creatinine   1.1 


 


Estimated GFR (MDRD)   65 L 


 


Glucose   86 


 


Lactic Acid    4.3 H*


 


Calcium   9.4 


 


Total Bilirubin   0.7 


 


AST   25 


 


ALT   16 


 


Alkaline Phosphatase   53 


 


Total Protein   7.1 


 


Albumin   3.9 


 


Globulin   3.2 


 


Albumin/Globulin Ratio   1.2 


 


Lipase   39 


 


Urine Color   


 


Urine Clarity   


 


Urine pH   


 


Ur Specific Gravity   


 


Urine Protein   


 


Urine Glucose (UA)   


 


Urine Ketones   


 


Urine Occult Blood   


 


Urine Nitrite   


 


Urine Bilirubin   


 


Urine Urobilinogen   


 


Ur Leukocyte Esterase   


 


Urine RBC   


 


Urine WBC   


 


Ur Squamous Epith Cells   


 


Urine Bacteria   


 


Ur Microscopic Review   


 


Urine Culture Comments   














  09/11/20





  18:22


 


WBC 


 


RBC 


 


Hgb 


 


Hct 


 


MCV 


 


MCH 


 


MCHC 


 


RDW 


 


Plt Count 


 


MPV 


 


Neut # (Auto) 


 


Lymph # (Auto) 


 


Mono # (Auto) 


 


Eos # (Auto) 


 


Baso # (Auto) 


 


Absolute Nucleated RBC 


 


Total Counted 


 


Band Neuts % (Manual) 


 


Abnorm Lymph % (Manual) 


 


Nucleated RBC % 


 


Neutrophils # (Manual) 


 


Lymphocytes # (Manual) 


 


Monocytes # (Manual) 


 


Eosinophils # (Manual) 


 


Basophils # (Manual) 


 


Differential Comment 


 


Platelet Estimate 


 


Platelet Morphology 


 


RBC Morph Micro Appear 


 


Sodium 


 


Potassium 


 


Chloride 


 


Carbon Dioxide 


 


Anion Gap 


 


BUN 


 


Creatinine 


 


Estimated GFR (MDRD) 


 


Glucose 


 


Lactic Acid 


 


Calcium 


 


Total Bilirubin 


 


AST 


 


ALT 


 


Alkaline Phosphatase 


 


Total Protein 


 


Albumin 


 


Globulin 


 


Albumin/Globulin Ratio 


 


Lipase 


 


Urine Color  LT RED


 


Urine Clarity  HAZY


 


Urine pH  6.0


 


Ur Specific Gravity  1.010


 


Urine Protein  NEGATIVE


 


Urine Glucose (UA)  NEGATIVE


 


Urine Ketones  NEGATIVE


 


Urine Occult Blood  LARGE H


 


Urine Nitrite  NEGATIVE


 


Urine Bilirubin  NEGATIVE


 


Urine Urobilinogen  0.2 (NORMAL)


 


Ur Leukocyte Esterase  LARGE H


 


Urine RBC  11-25 H


 


Urine WBC  11-25 H


 


Ur Squamous Epith Cells  NONE SEEN


 


Urine Bacteria  Many H


 


Ur Microscopic Review  INDICATED


 


Urine Culture Comments  INDICATED














- Rads (name of study)


  ** CT KUB


Radiology: EMP read contemporaneously (Wall thickening and perivesicular 

inflammation consistent with cystitis.  Possible 5.6 cm bladder diverticulum 

without stones.  No obstructive uropathy.)





PD MEDICAL DECISION MAKING





- ED course


ED course: 





75-year-old gentleman presents with an obstructed Pineda catheter with hematuria.

 Tried irrigating it at bedside which was unsuccessful and subsequently 

replacement of the Pineda was done by the nurse with significant output, however 

despite that his suprapubic pain and some left flank pain worsened and this is 

in the setting of a fever.





Sepsis time of onset as per sepsis orders (1842) when lactic acidosis was noted.

 Given cefepime.  Note made that I had ordered 2 blood cultures but nurse 

started antibiotics after the first without obtaining the second.  Was given 30 

mils per kilogram of crystalloid.  CT done to rule out obstruction and negative 

for same.





- Critical Care


Time(min): 45


Time Includes: Direct patient care, Review records, Reassess patient, Document 

care, Coordinate care, Medical consult, Family consult for tx dec


Data interpretation: Labs, Pulse ox


Procedures included in critical care time: Peripheral IV





- Sepsis Event


Current Stage of Sepsis: Sepsis


Possible source of Sepsis: Genitourinary


Mental/Cognitive Status: Normal for patient


Capillary refill: Less than 2 seconds


Peripheral Pulse Strength: 3+ Normal


Sepsis Comment: 





Lactic acidosis + source





Departure





- Departure


Disposition: [facility] DC/Xfer


Clinical Impression: 


 Neurogenic bladder as late effect of cerebrovascular accident (CVA), Chronic 

atrial fibrillation





Complication, blocked Pineda catheter


Qualifiers:


 Encounter type: initial encounter Qualified Code(s): T83.091A - Other 

mechanical complication of indwelling urethral catheter, initial encounter





Fever


Qualifiers:


 Fever type: due to other condition Qualified Code(s): R50.81 - Fever presenting

with conditions classified elsewhere





Sepsis


Qualifiers:


 Sepsis type: sepsis due to unspecified organism Sepsis acute organ dysfunction 

status: without acute organ dysfunction Qualified Code(s): A41.9 - Sepsis, 

unspecified organism





Condition: Serious


Discharge Date/Time: 09/11/20 21:21

## 2020-09-12 LAB
ANION GAP SERPL CALCULATED.4IONS-SCNC: 8 MMOL/L (ref 6–13)
BASOPHILS # BLD MANUAL: 0 10^3/UL (ref 0–0.1)
BASOPHILS NFR BLD AUTO: 0.5 %
BUN SERPL-MCNC: 17 MG/DL (ref 6–20)
CALCIUM UR-MCNC: 8.7 MG/DL (ref 8.5–10.3)
CHLORIDE SERPL-SCNC: 106 MMOL/L (ref 101–111)
CO2 SERPL-SCNC: 25 MMOL/L (ref 21–32)
CREAT SERPLBLD-SCNC: 1 MG/DL (ref 0.6–1.2)
EOSINOPHIL # BLD MANUAL: 0 10^3/UL (ref 0–0.7)
EOSINOPHIL NFR BLD AUTO: 0.1 %
ERYTHROCYTE [DISTWIDTH] IN BLOOD BY AUTOMATED COUNT: 13.7 % (ref 12–15)
GLUCOSE SERPL-MCNC: 156 MG/DL (ref 70–100)
HGB UR QL STRIP: 13.6 G/DL (ref 14–18)
LYMPH ABN NFR BLD MANUAL: 0 %
LYMPHOBLASTS # BLD: 1 %
LYMPHOCYTES # BLD MANUAL: 0.3 10^3/UL (ref 1.5–3.5)
LYMPHOCYTES NFR BLD AUTO: 1.5 %
MANUAL DIF COMMENT BLD-IMP: (no result)
MCH RBC QN AUTO: 33.1 PG (ref 27–31)
MCHC RBC AUTO-ENTMCNC: 32.8 G/DL (ref 32–36)
MCV RBC AUTO: 101 FL (ref 80–94)
MONOCYTES # BLD MANUAL: 0.9 10^3/UL (ref 0–1)
MONOCYTES NFR BLD AUTO: 4.6 %
NEUTROPHILS # SNV AUTO: 28.6 X10^3/UL (ref 4.8–10.8)
NEUTROPHILS NFR BLD AUTO: 90.3 %
NEUTS BAND NFR BLD: 3 %
PDW BLD AUTO: 11.5 FL (ref 7.4–11.4)
PLAT MORPH BLD: (no result)
PLATELET # BLD: 183 10^3/UL (ref 130–450)
PLATELET BLD QL SMEAR: (no result)
RBC MAR: 4.11 10^6/UL (ref 4.7–6.1)
RBC MORPH BLD: (no result)
SODIUM SERPLBLD-SCNC: 139 MMOL/L (ref 135–145)

## 2020-09-12 RX ADMIN — SODIUM CHLORIDE, PRESERVATIVE FREE SCH: 5 INJECTION INTRAVENOUS at 00:18

## 2020-09-12 RX ADMIN — PANTOPRAZOLE SODIUM SCH MG: 40 TABLET, DELAYED RELEASE ORAL at 06:38

## 2020-09-12 RX ADMIN — ACETAMINOPHEN PRN MG: 325 TABLET ORAL at 06:38

## 2020-09-12 RX ADMIN — DEXTROSE MONOHYDRATE SCH MLS/HR: 50 INJECTION, SOLUTION INTRAVENOUS at 09:00

## 2020-09-12 RX ADMIN — SODIUM CHLORIDE SCH MLS/HR: 9 INJECTION, SOLUTION INTRAVENOUS at 18:45

## 2020-09-12 RX ADMIN — SODIUM CHLORIDE SCH MLS/HR: 9 INJECTION, SOLUTION INTRAVENOUS at 06:43

## 2020-09-12 RX ADMIN — SODIUM CHLORIDE, PRESERVATIVE FREE SCH: 5 INJECTION INTRAVENOUS at 18:46

## 2020-09-12 RX ADMIN — SODIUM CHLORIDE, PRESERVATIVE FREE SCH: 5 INJECTION INTRAVENOUS at 09:01

## 2020-09-12 RX ADMIN — DABIGATRAN ETEXILATE MESYLATE SCH MG: 75 CAPSULE ORAL at 22:00

## 2020-09-12 RX ADMIN — OXYCODONE PRN MG: 5 TABLET ORAL at 22:00

## 2020-09-12 NOTE — PROVIDER PROGRESS NOTE
Assessment/Plan





- Problem List


(1) Sepsis


Assessment/Plan: 


Lactic acid level of 4.5 has corrected down to 1.5


10.6 white blood count at admission has climbed to 28.6 today, and UTI as the 

source.


Continue with IV fluids, iv antibiotics.








(2) Gram-negative bacteremia


Assessment/Plan: 


He has a complicated UTI, chronic indwelling Pineda in place and the CT abdomen 

pelvis showed positive cystitis with bladder wall streaking.


Follow the white blood count 4 improvement.


Await culture results of identification and sensitivity.


Conitnue empiric IV ceftriaxone for now.








(3) UTI (urinary tract infection)


Assessment/Plan: 


This ss a complicated UTI; he has a chronic indwelling Pineda for his neurogenic 

bladder and BPH, there is evidence of cystitis but not pyelonephritis.


Await urine culture and sensitivity results.


Remain on empiric iv ceftriaxone for now








(4) Complication, blocked Pineda catheter


Qualifiers: 


   Encounter type: initial encounter   Qualified Code(s): T83.091A - Other 

mechanical complication of indwelling urethral catheter, initial encounter   


Assessment/Plan: 


As per hx, the Pineda placed in the ED is patent








(5) Scrotal swelling


Assessment/Plan: 


He had tenderness localized to the scrotum when examined by the admitting 

hospitalist.


Ultrasound of the testicles was ordered and shows a large, hypoechoic, septated 

mass of the right side, which is likely a spermatocele.


We will request a consultation regarding management from General Surgery, we do 

not have Urology here.








(6) Neurogenic bladder as late effect of cerebrovascular accident (CVA)


Assessment/Plan: 


As per Hx








(7) Chronic indwelling Pineda catheter


Assessment/Plan: 


As per Hx.


Details of why the Pineda was changed yesterday in the first place, before coming

to the ER, is not clear.


His medication list (which has not yet been reconciled by pharmacy), indicates 

he takes Keflex, Macrobid, Bactrim and Cipro, presumably as prophylaxis/to 

suppress UTIs.








(8) BPH (benign prostatic hyperplasia)


Assessment/Plan: 


As per Hx.


Resume any meds for BPH once his medications are reconciled.








(9) Chronic atrial fibrillation


Assessment/Plan: 


Rate is controlled, without any heart rate slowing meds, this is a sign of 

conduction system disease.


He remains on Pradaxa for stroke prophylaxis.








(10) Dementia


Assessment/Plan: 


Reportedly, this started after his CVA








- Current Meds


Current Meds: 





                               Current Medications











Generic Name Dose Route Start Last Admin





  Trade Name Ancelmoq  PRN Reason Stop Dose Admin


 


Acetaminophen  650 mg  09/11/20 22:16  09/12/20 06:38





  Tylenol  PO   650 mg





  Q4HR PRN   Administration





  Pain 1 to 4  


 


Sodium Chloride  1,000 mls @ 100 mls/hr  09/11/20 21:00  09/12/20 06:43





  Normal Saline 0.9%  IV   100 mls/hr





  .Q10H SEMAJ   Administration


 


Ondansetron HCl  4 mg  09/12/20 04:02  09/12/20 04:13





  Zofran Inj  IVP   4 mg





  Q4HR PRN   Administration





  Nausea / Vomiting  


 


Pantoprazole Sodium  40 mg  09/12/20 07:00  09/12/20 06:38





  Protonix  PO   40 mg





  QDAC SEMAJ   Administration


 


Sodium Chloride  10 ml  09/12/20 01:00  09/12/20 00:18





  Normal Saline Flush 0.9%  IVP   Not Given





  0100,0900,1700 SEMAJ  














- Lab Result


Fish Bone Diagrams: 


                                 09/12/20 04:40





                                 09/12/20 04:40





Subjective





- Subjective


Patient Reports: Resting Comfortably, Other (Awake, confused, was calling for 

help to be repositioned)





Objective


Vital Signs: 





                               Vital Signs - 24 hr











  09/11/20 09/11/20 09/11/20





  17:10 17:28 18:42


 


Temperature 38.6 C H  99.7 C H


 


Heart Rate 91 75 114 H


 


Heart Rate [   





Brachial]   


 


Heart Rate [   





Monitoring   





electrodes]   


 


Respiratory 18 16 23





Rate   


 


Blood Pressure 186/97 H 178/105 H 119/55 L


 


Blood Pressure   





[Right Brachial   





artery]   


 


O2 Saturation 98 100 95














  09/11/20 09/11/20 09/11/20





  19:12 19:37 20:16


 


Temperature 99.1 C H 37.9 C H 37.9 C H


 


Heart Rate 104 H 96 105 H


 


Heart Rate [   





Brachial]   


 


Heart Rate [   





Monitoring   





electrodes]   


 


Respiratory 20 19 18





Rate   


 


Blood Pressure 119/55 L 135/72 H 148/72 H


 


Blood Pressure   





[Right Brachial   





artery]   


 


O2 Saturation 96 98 98














  09/11/20 09/11/20 09/11/20





  20:37 21:01 21:25


 


Temperature   37.6 C H


 


Heart Rate 99 105 H 


 


Heart Rate [   80





Brachial]   


 


Heart Rate [   





Monitoring   





electrodes]   


 


Respiratory 18 19 18





Rate   


 


Blood Pressure 146/79 H 146/79 H 


 


Blood Pressure   136/89 H





[Right Brachial   





artery]   


 


O2 Saturation 98 99 99














  09/11/20 09/11/20 09/12/20





  22:20 23:18 00:05


 


Temperature 38 C H 36.6 C 37.1 C


 


Heart Rate   


 


Heart Rate [   94





Brachial]   


 


Heart Rate [   





Monitoring   





electrodes]   


 


Respiratory   16





Rate   


 


Blood Pressure   


 


Blood Pressure   104/41 L





[Right Brachial   





artery]   


 


O2 Saturation   95














  09/12/20 09/12/20





  03:34 08:15


 


Temperature 37.0 C 36.9 C


 


Heart Rate  


 


Heart Rate [ 74 





Brachial]  


 


Heart Rate [  71





Monitoring  





electrodes]  


 


Respiratory 17 18





Rate  


 


Blood Pressure  


 


Blood Pressure 115/71 102/56 L





[Right Brachial  





artery]  


 


O2 Saturation 96 98








                                     Oxygen











O2 Source [With Activity]      Room air


 


O2 Source                      Room air














I&O (Last 24 Hrs): 





                          Intake and Output Totals x24h











 09/10/20 09/11/20 09/12/20





 23:59 23:59 23:59


 


Intake Total  3121.077 776.667


 


Output Total  2000 350


 


Balance  1121.077 426.667











General: Alert


HEENT: Mucous membr. moist/pink


Neck: Supple


Neuro: Disoriented, Non Focal


Cardiovascular: Other (irreg)


Respiratory: No respiratory distress


Abdomen: Soft


Genitourinary: Other (Pineda in place)


Extremities: No edema





- Results


Results: 





                               Laboratory Results











WBC  28.6 x10^3/uL (4.8-10.8)  H  09/12/20  04:40    


 


RBC  4.11 10^6/uL (4.70-6.10)  L  09/12/20  04:40    


 


Hgb  13.6 g/dL (14.0-18.0)  L  09/12/20  04:40    


 


Hct  41.5 % (42.0-52.0)  L  09/12/20  04:40    


 


MCV  101.0 fL (80.0-94.0)  H  09/12/20  04:40    


 


MCH  33.1 pg (27.0-31.0)  H  09/12/20  04:40    


 


MCHC  32.8 g/dL (32.0-36.0)   09/12/20  04:40    


 


RDW  13.7 % (12.0-15.0)   09/12/20  04:40    


 


Plt Count  183 10^3/uL (130-450)   09/12/20  04:40    


 


MPV  11.5 fL (7.4-11.4)  H  09/12/20  04:40    


 


Neut # (Auto)  Not Reportable   09/12/20  04:40    


 


Lymph # (Auto)  Not Reportable   09/12/20  04:40    


 


Mono # (Auto)  Not Reportable   09/12/20  04:40    


 


Eos # (Auto)  Not Reportable   09/12/20  04:40    


 


Baso # (Auto)  Not Reportable   09/12/20  04:40    


 


Absolute Nucleated RBC  Not Reportable   09/12/20  04:40    


 


Total Counted  100   09/12/20  04:40    


 


Band Neuts % (Manual)  3 % (0-10)  09/12/20  04:40    


 


Abnorm Lymph % (Manual)  0 %  09/12/20  04:40    


 


Nucleated RBC %  Not Reportable   09/12/20  04:40    


 


Neutrophils # (Manual)  27.5 10^3/uL (1.5-6.6)  H  09/12/20  04:40    


 


Lymphocytes # (Manual)  0.3 10^3/uL (1.5-3.5)  L  09/12/20  04:40    


 


Monocytes # (Manual)  0.9 10^3/uL (0.0-1.0)   09/12/20  04:40    


 


Eosinophils # (Manual)  0.0 10^3/uL (0-0.7)   09/12/20  04:40    


 


Basophils # (Manual)  0.0 10^3/uL (0-0.1)   09/12/20  04:40    


 


Differential Comment  MANUAL DIFFERENTIAL   09/12/20  04:40    


 


WBC Morphology  NORMAL APPEARANCE  (NORMAL)   09/12/20  04:40    


 


Platelet Estimate  NORMAL (130-450,000)  (NORMAL)   09/12/20  04:40    


 


Platelet Morphology  NORMAL APPEARANCE  (NORMAL)   09/12/20  04:40    


 


RBC Morph Micro Appear  NORMAL APPEARANCE  (NORMAL)   09/12/20  04:40    


 


Sodium  139 mmol/L (135-145)   09/12/20  04:40    


 


Potassium  4.0 mmol/L (3.5-5.0)   09/12/20  04:40    


 


Chloride  106 mmol/L (101-111)   09/12/20  04:40    


 


Carbon Dioxide  25 mmol/L (21-32)   09/12/20  04:40    


 


Anion Gap  8.0  (6-13)   09/12/20  04:40    


 


BUN  17 mg/dL (6-20)   09/12/20  04:40    


 


Creatinine  1.0 mg/dL (0.6-1.2)   09/12/20  04:40    


 


Estimated GFR (MDRD)  72  (>89)  L  09/12/20  04:40    


 


Glucose  156 mg/dL ()  H  09/12/20  04:40    


 


Lactic Acid  1.5 mmol/L (0.5-2.2)   09/11/20  20:40    


 


Calcium  8.7 mg/dL (8.5-10.3)   09/12/20  04:40    


 


Total Bilirubin  0.7 mg/dL (0.2-1.0)   09/11/20  18:15    


 


AST  25 IU/L (10-42)   09/11/20  18:15    


 


ALT  16 IU/L (10-60)   09/11/20  18:15    


 


Alkaline Phosphatase  53 IU/L ()   09/11/20  18:15    


 


Total Protein  7.1 g/dL (6.7-8.2)   09/11/20  18:15    


 


Albumin  3.9 g/dL (3.2-5.5)   09/11/20  18:15    


 


Globulin  3.2 g/dL (2.1-4.2)   09/11/20  18:15    


 


Albumin/Globulin Ratio  1.2  (1.0-2.2)   09/11/20  18:15    


 


Lipase  39 U/L (22-51)   09/11/20  18:15    


 


Urine Color  LT RED   09/11/20  18:22    


 


Urine Clarity  HAZY  (CLEAR)   09/11/20  18:22    


 


Urine pH  6.0 PH (5.0-7.5)   09/11/20  18:22    


 


Ur Specific Gravity  1.010  (1.002-1.030)   09/11/20  18:22    


 


Urine Protein  NEGATIVE mg/dL (NEGATIVE)   09/11/20  18:22    


 


Urine Glucose (UA)  NEGATIVE mg/dL (NEGATIVE)   09/11/20  18:22    


 


Urine Ketones  NEGATIVE mg/dL (NEGATIVE)   09/11/20  18:22    


 


Urine Occult Blood  LARGE  (NEGATIVE)  H  09/11/20  18:22    


 


Urine Nitrite  NEGATIVE  (NEGATIVE)   09/11/20  18:22    


 


Urine Bilirubin  NEGATIVE  (NEGATIVE)   09/11/20  18:22    


 


Urine Urobilinogen  0.2 (NORMAL) E.U./dL (NORMAL)   09/11/20  18:22    


 


Ur Leukocyte Esterase  LARGE  (NEGATIVE)  H  09/11/20  18:22    


 


Urine RBC  11-25 /HPF (0-5)  H  09/11/20  18:22    


 


Urine WBC  11-25 /HPF (0-3)  H  09/11/20  18:22    


 


Ur Squamous Epith Cells  NONE SEEN  (<= Few)   09/11/20  18:22    


 


Urine Bacteria  Many /HPF (None Seen)  H  09/11/20  18:22    


 


Ur Microscopic Review  INDICATED   09/11/20  18:22    


 


Urine Culture Comments  INDICATED   09/11/20  18:22    














Sepsis Event Note (H)





- Evaluation


Current Stage of Sepsis: Sepsis


Possible source of Sepsis: positive: Genitourinary





- Sepsis Criteria


Sepsis Criteria: Recorded Temperature greater than 38.3C or Less than 36C, 

Recorded Heart Rate greater than 90 bpm, WBC count greater than 10% bands, 

Metabolic: lactate > 2 mmol/L

## 2020-09-12 NOTE — ULTRASOUND REPORT
PROCEDURE:  Testicle

 

INDICATIONS:  scrotal swelling and pain

 

TECHNIQUE:  

Real-time scanning was performed of the scrotum and testicles, with image documentation.  Color and p
ulse Doppler interrogation was performed of both testicles.  

 

COMPARISON:  None.

 

FINDINGS:  

 

Right:  Testicle is normal in size at 3.2 x 2.0 x 2.5 cm, and homogenous in echotexture.  Epididymis 
is not visualized.  No hydrocele or varicoceles. Prominent septated focus of hypoechogenicity appears
 to be surrounding the testicle and present within the scrotal sac. Multiple areas of septation are i
dentified.

 

Left:  Testicle is normal in size at 3.5 x 2.8 x 2.8 cm, and homogeneous in echotexture. There appear
s to be incidental note of prominence of the rete testis. Epididymis is normal in overall size and mo
rphology. Varicocele is present. Overlying scrotal skin is normal in thickness.  

 

Doppler:  Color and pulse Doppler demonstrate normal and symmetric arterial flow in both testicles.  


 

IMPRESSION:  

1. Large hypoechoic focus with septations surrounding the right testicle in the scrotal sac. Overall 
appearance appears most suggestive of a large spermatocele.

 

Reviewed by: Kelley Alvarez MD on 9/12/2020 11:21 AM PDT

Approved by: Kelley Alvarez MD on 9/12/2020 11:21 AM PDT

 

 

Station ID:  [id number]

## 2020-09-13 LAB
ANION GAP SERPL CALCULATED.4IONS-SCNC: 7 MMOL/L (ref 6–13)
BASOPHILS NFR BLD AUTO: 0.1 10^3/UL (ref 0–0.1)
BASOPHILS NFR BLD AUTO: 0.6 %
BUN SERPL-MCNC: 14 MG/DL (ref 6–20)
CALCIUM UR-MCNC: 8.4 MG/DL (ref 8.5–10.3)
CHLORIDE SERPL-SCNC: 108 MMOL/L (ref 101–111)
CO2 SERPL-SCNC: 25 MMOL/L (ref 21–32)
CREAT SERPLBLD-SCNC: 1 MG/DL (ref 0.6–1.2)
EOSINOPHIL # BLD AUTO: 0.1 10^3/UL (ref 0–0.7)
EOSINOPHIL NFR BLD AUTO: 0.5 %
ERYTHROCYTE [DISTWIDTH] IN BLOOD BY AUTOMATED COUNT: 13.8 % (ref 12–15)
GLUCOSE SERPL-MCNC: 103 MG/DL (ref 70–100)
HGB UR QL STRIP: 13.4 G/DL (ref 14–18)
LYMPHOCYTES # SPEC AUTO: 1.3 10^3/UL (ref 1.5–3.5)
LYMPHOCYTES NFR BLD AUTO: 7.2 %
MCH RBC QN AUTO: 32.4 PG (ref 27–31)
MCHC RBC AUTO-ENTMCNC: 33 G/DL (ref 32–36)
MCV RBC AUTO: 98.3 FL (ref 80–94)
MONOCYTES # BLD AUTO: 1 10^3/UL (ref 0–1)
MONOCYTES NFR BLD AUTO: 5.5 %
NEUTROPHILS # BLD AUTO: 15.2 10^3/UL (ref 1.5–6.6)
NEUTROPHILS # SNV AUTO: 17.8 X10^3/UL (ref 4.8–10.8)
NEUTROPHILS NFR BLD AUTO: 85.5 %
PDW BLD AUTO: 11.4 FL (ref 7.4–11.4)
PLATELET # BLD: 185 10^3/UL (ref 130–450)
RBC MAR: 4.13 10^6/UL (ref 4.7–6.1)
SODIUM SERPLBLD-SCNC: 140 MMOL/L (ref 135–145)

## 2020-09-13 RX ADMIN — FINASTERIDE SCH MG: 5 TABLET, FILM COATED ORAL at 08:48

## 2020-09-13 RX ADMIN — DABIGATRAN ETEXILATE MESYLATE SCH MG: 75 CAPSULE ORAL at 08:48

## 2020-09-13 RX ADMIN — ACETAMINOPHEN PRN MG: 325 TABLET ORAL at 21:09

## 2020-09-13 RX ADMIN — SODIUM CHLORIDE SCH MLS/HR: 9 INJECTION, SOLUTION INTRAVENOUS at 17:13

## 2020-09-13 RX ADMIN — SODIUM CHLORIDE SCH MLS/HR: 9 INJECTION, SOLUTION INTRAVENOUS at 05:10

## 2020-09-13 RX ADMIN — SODIUM CHLORIDE, PRESERVATIVE FREE SCH: 5 INJECTION INTRAVENOUS at 17:13

## 2020-09-13 RX ADMIN — SODIUM CHLORIDE, PRESERVATIVE FREE SCH ML: 5 INJECTION INTRAVENOUS at 00:30

## 2020-09-13 RX ADMIN — OXYCODONE PRN MG: 5 TABLET ORAL at 07:52

## 2020-09-13 RX ADMIN — DEXTROSE MONOHYDRATE SCH MLS/HR: 50 INJECTION, SOLUTION INTRAVENOUS at 08:48

## 2020-09-13 RX ADMIN — PANTOPRAZOLE SODIUM SCH: 40 TABLET, DELAYED RELEASE ORAL at 06:53

## 2020-09-13 RX ADMIN — SODIUM CHLORIDE, PRESERVATIVE FREE SCH: 5 INJECTION INTRAVENOUS at 10:16

## 2020-09-13 RX ADMIN — DABIGATRAN ETEXILATE MESYLATE SCH MG: 75 CAPSULE ORAL at 20:33

## 2020-09-13 NOTE — PROVIDER PROGRESS NOTE
Assessment/Plan





- Problem List


(1) Gram-negative bacteremia


Assessment/Plan: 


Awaiting identification and then sensitivities.


Computer continue empiric ceftriaxone IV.


A 14-day total course is planned. Will switch to p.o. antibiotics when 

appropriate.








(2) E. coli UTI


Assessment/Plan: 


The urine culture has grown E. coli which is pansensitive.


Continue with IV antibiotics, because of the bacteremia and cystitis was found 

on imaging of the abdomen pelvis by CT.


A total 14-day course of antibiotics is planned.  Will switch to p.o. 

antibiotics when appropriate.








(3) Complication, blocked Pineda catheter


Qualifiers: 


   Encounter type: initial encounter   Qualified Code(s): T83.091A - Other 

mechanical complication of indwelling urethral catheter, initial encounter   


Assessment/Plan: 


Pineda was blocked and was replaced in the ER this admission.








(4) Spermatocele of epididymis, single


Assessment/Plan: 


He had tenderness localized to the scrotum when examined by the admitting 

Hospitalist.


Ultrasound of the testicles was ordered and shows a large, hypoechoic, septated 

mass of the right side, which is a spermatocele.


We will request a consultation regarding management from General Surgery, we do 

not have Urology here.








(5) Neurogenic bladder as late effect of cerebrovascular accident (CVA)


Assessment/Plan: 


As per history.  He used to have to self catheterize himself, and by report, he 

did this every 45 minutes, probably from his dementia.


Now he has a chronic indwelling Pineda.








(6) Chronic indwelling Pineda catheter


Assessment/Plan: 


As per Hx.


It is unclear why the Pineda was changed on the day that led up to the fever, and

presentation to the ER.


Pineda was blocked and was replaced in the ER this admission.








(7) BPH (benign prostatic hyperplasia)


Assessment/Plan: 


BPH meds have been started to use here








(8) Dementia


Assessment/Plan: 


He has dementia with behavioral disturbances, needs a lot of supervision.


24-hour long-term care as needed after discharge, will communicate this to 

Social Work and discharge RN.








(9) Chronic atrial fibrillation


Assessment/Plan: 


His heart rate is under control, without any heart rate slowing meds; this is a 

sign of conduction system disease but there are no bradycardias that are concer

audra.


He is on Pradaxa for stroke prophylaxis.








(10) Sepsis


Assessment/Plan: 


Cleared








- Current Meds


Current Meds: 





                               Current Medications











Generic Name Dose Route Start Last Admin





  Trade Name Ancelmoq  PRN Reason Stop Dose Admin


 


Acetaminophen  650 mg  09/11/20 22:16  09/12/20 06:38





  Tylenol  PO   650 mg





  Q4HR PRN   Administration





  Pain 1 to 4  


 


Dabigatran  150 mg  09/12/20 21:00  09/13/20 08:48





  Pradaxa  PO   150 mg





  BID SEMAJ   Administration


 


Finasteride  5 mg  09/13/20 09:00  09/13/20 08:48





  Proscar  PO   5 mg





  DAILY SEMAJ   Administration


 


Sodium Chloride  1,000 mls @ 100 mls/hr  09/11/20 21:00  09/13/20 05:10





  Normal Saline 0.9%  IV   100 mls/hr





  .Q10H SEMAJ   Administration


 


Ceftriaxone Sodium 1 gm/  100 mls @ 200 mls/hr  09/12/20 09:00  09/13/20 08:48





  Sodium Chloride  IV   200 mls/hr





  DAILY SEMAJ   Administration


 


Ondansetron HCl  4 mg  09/12/20 04:02  09/12/20 04:13





  Zofran Inj  IVP   4 mg





  Q4HR PRN   Administration





  Nausea / Vomiting  


 


Oxycodone HCl  5 mg  09/11/20 20:21  09/13/20 07:52





  Roxicodone  PO   5 mg





  Q4HR PRN   Administration





  Pain 5 to 7  


 


Pantoprazole Sodium  40 mg  09/12/20 07:00  09/13/20 06:53





  Protonix  PO   Not Given





  QDAC SEMAJ  


 


Sodium Chloride  10 ml  09/12/20 01:00  09/13/20 00:30





  Normal Saline Flush 0.9%  IVP   10 ml





  0100,0900,1700 SEMAJ   Administration














- Lab Result


Fish Bone Diagrams: 


                                 09/13/20 07:25





                                 09/13/20 07:25





- Additional Planning


My Orders: 





My Active Orders





09/12/20 15:20


polyethylene glycoL 3350 [Miralax]   17 gm PO DAILY PRN 





09/12/20 21:00


Dabigatran [Pradaxa]   150 mg PO BID 





09/13/20 09:00


Finasteride [Proscar]   5 mg PO DAILY 





09/14/20 05:00


MAGNESIUM [CHEM] DAILYLAB 














Subjective





- Subjective


Nursing Reports: Other (This patient needs constant monitoring from his RN and 

CNA, he tries to get up out of bed often, has pulled out his IV and then played 

with the iv line, calls for help just to be repositioned in bed.)





Objective


Vital Signs: 





                               Vital Signs - 24 hr











  09/12/20 09/12/20 09/12/20





  11:25 12:42 16:01


 


Temperature  36.7 C 36.7 C


 


Heart Rate [ 72  





Activity]   


 


Heart Rate [  55 L 65





Brachial]   


 


Heart Rate [  99 





Monitoring   





electrodes]   


 


Heart Rate [ 75  





Sitting]   


 


Heart Rate [ 74  





Supine]   


 


Respiratory  20 18





Rate   


 


Blood Pressure 114/64  





[Activity]   


 


Blood Pressure   





[Left Brachial   





artery]   


 


Blood Pressure  113/69 121/65





[Right Brachial   





artery]   


 


Blood Pressure 108/70  





[Sitting]   


 


Blood Pressure 122/75  





[Supine]   


 


O2 Saturation  98 100














  09/12/20 09/12/20 09/13/20





  22:06 23:20 04:30


 


Temperature 36.6 C 36.6 C 36.8 C


 


Heart Rate [   





Activity]   


 


Heart Rate [ 77  





Brachial]   


 


Heart Rate [  62 92





Monitoring   





electrodes]   


 


Heart Rate [   





Sitting]   


 


Heart Rate [   





Supine]   


 


Respiratory 18 16 16





Rate   


 


Blood Pressure   





[Activity]   


 


Blood Pressure   129/94 H





[Left Brachial   





artery]   


 


Blood Pressure 153/92 H 148/89 H 





[Right Brachial   





artery]   


 


Blood Pressure   





[Sitting]   


 


Blood Pressure   





[Supine]   


 


O2 Saturation 98 98 99














  09/13/20





  08:03


 


Temperature 36.7 C


 


Heart Rate [ 





Activity] 


 


Heart Rate [ 





Brachial] 


 


Heart Rate [ 93





Monitoring 





electrodes] 


 


Heart Rate [ 





Sitting] 


 


Heart Rate [ 





Supine] 


 


Respiratory 18





Rate 


 


Blood Pressure 





[Activity] 


 


Blood Pressure 148/93 H





[Left Brachial 





artery] 


 


Blood Pressure 





[Right Brachial 





artery] 


 


Blood Pressure 





[Sitting] 


 


Blood Pressure 





[Supine] 


 


O2 Saturation 97








                                     Oxygen











O2 Source [With Activity]      Room air


 


O2 Source                      Room air














I&O (Last 24 Hrs): 





                          Intake and Output Totals x24h











 09/11/20 09/12/20 09/13/20





 23:59 23:59 23:59


 


Intake Total 3121.077 2176.667 1000


 


Output Total 2000 4610 650


 


Balance 1121.077 -248.333 350











General: Other (Awake, appears in no distress)


HEENT: Atraumatic, Mucous membr. moist/pink


Neck: Supple


Neuro: Disoriented


Cardiovascular: Other (Irreg irreg)


Respiratory: No respiratory distress


Abdomen: Soft


Extremities: No edema





- Results


Results: 





                               Laboratory Results











WBC  17.8 x10^3/uL (4.8-10.8)  H  09/13/20  07:25    


 


RBC  4.13 10^6/uL (4.70-6.10)  L  09/13/20  07:25    


 


Hgb  13.4 g/dL (14.0-18.0)  L  09/13/20  07:25    


 


Hct  40.6 % (42.0-52.0)  L  09/13/20  07:25    


 


MCV  98.3 fL (80.0-94.0)  H  09/13/20  07:25    


 


MCH  32.4 pg (27.0-31.0)  H  09/13/20  07:25    


 


MCHC  33.0 g/dL (32.0-36.0)   09/13/20  07:25    


 


RDW  13.8 % (12.0-15.0)   09/13/20  07:25    


 


Plt Count  185 10^3/uL (130-450)   09/13/20  07:25    


 


MPV  11.4 fL (7.4-11.4)   09/13/20  07:25    


 


Neut # (Auto)  15.2 10^3/uL (1.5-6.6)  H  09/13/20  07:25    


 


Lymph # (Auto)  1.3 10^3/uL (1.5-3.5)  L  09/13/20  07:25    


 


Mono # (Auto)  1.0 10^3/uL (0.0-1.0)   09/13/20  07:25    


 


Eos # (Auto)  0.1 10^3/uL (0.0-0.7)   09/13/20  07:25    


 


Baso # (Auto)  0.1 10^3/uL (0.0-0.1)   09/13/20  07:25    


 


Absolute Nucleated RBC  0.00 x10^3/uL  09/13/20  07:25    


 


Total Counted  100   09/12/20  04:40    


 


Band Neuts % (Manual)  3 % (0-10)  09/12/20  04:40    


 


Abnorm Lymph % (Manual)  0 %  09/12/20  04:40    


 


Nucleated RBC %  0.0 /100WBC  09/13/20  07:25    


 


Neutrophils # (Manual)  27.5 10^3/uL (1.5-6.6)  H  09/12/20  04:40    


 


Lymphocytes # (Manual)  0.3 10^3/uL (1.5-3.5)  L  09/12/20  04:40    


 


Monocytes # (Manual)  0.9 10^3/uL (0.0-1.0)   09/12/20  04:40    


 


Eosinophils # (Manual)  0.0 10^3/uL (0-0.7)   09/12/20  04:40    


 


Basophils # (Manual)  0.0 10^3/uL (0-0.1)   09/12/20  04:40    


 


Differential Comment  MANUAL DIFFERENTIAL   09/12/20  04:40    


 


WBC Morphology  NORMAL APPEARANCE  (NORMAL)   09/12/20  04:40    


 


Platelet Estimate  NORMAL (130-450,000)  (NORMAL)   09/12/20  04:40    


 


Platelet Morphology  NORMAL APPEARANCE  (NORMAL)   09/12/20  04:40    


 


RBC Morph Micro Appear  NORMAL APPEARANCE  (NORMAL)   09/12/20  04:40    


 


Sodium  140 mmol/L (135-145)   09/13/20  07:25    


 


Potassium  3.9 mmol/L (3.5-5.0)   09/13/20  07:25    


 


Chloride  108 mmol/L (101-111)   09/13/20  07:25    


 


Carbon Dioxide  25 mmol/L (21-32)   09/13/20  07:25    


 


Anion Gap  7.0  (6-13)   09/13/20  07:25    


 


BUN  14 mg/dL (6-20)   09/13/20  07:25    


 


Creatinine  1.0 mg/dL (0.6-1.2)   09/13/20  07:25    


 


Estimated GFR (MDRD)  72  (>89)  L  09/13/20  07:25    


 


Glucose  103 mg/dL ()  H  09/13/20  07:25    


 


Lactic Acid  1.5 mmol/L (0.5-2.2)   09/11/20  20:40    


 


Calcium  8.4 mg/dL (8.5-10.3)  L  09/13/20  07:25    


 


Total Bilirubin  0.7 mg/dL (0.2-1.0)   09/11/20  18:15    


 


AST  25 IU/L (10-42)   09/11/20  18:15    


 


ALT  16 IU/L (10-60)   09/11/20  18:15    


 


Alkaline Phosphatase  53 IU/L ()   09/11/20  18:15    


 


Total Protein  7.1 g/dL (6.7-8.2)   09/11/20  18:15    


 


Albumin  3.9 g/dL (3.2-5.5)   09/11/20  18:15    


 


Globulin  3.2 g/dL (2.1-4.2)   09/11/20  18:15    


 


Albumin/Globulin Ratio  1.2  (1.0-2.2)   09/11/20  18:15    


 


Lipase  39 U/L (22-51)   09/11/20  18:15    


 


Urine Color  LT RED   09/11/20  18:22    


 


Urine Clarity  HAZY  (CLEAR)   09/11/20  18:22    


 


Urine pH  6.0 PH (5.0-7.5)   09/11/20  18:22    


 


Ur Specific Gravity  1.010  (1.002-1.030)   09/11/20  18:22    


 


Urine Protein  NEGATIVE mg/dL (NEGATIVE)   09/11/20  18:22    


 


Urine Glucose (UA)  NEGATIVE mg/dL (NEGATIVE)   09/11/20  18:22    


 


Urine Ketones  NEGATIVE mg/dL (NEGATIVE)   09/11/20  18:22    


 


Urine Occult Blood  LARGE  (NEGATIVE)  H  09/11/20  18:22    


 


Urine Nitrite  NEGATIVE  (NEGATIVE)   09/11/20  18:22    


 


Urine Bilirubin  NEGATIVE  (NEGATIVE)   09/11/20  18:22    


 


Urine Urobilinogen  0.2 (NORMAL) E.U./dL (NORMAL)   09/11/20  18:22    


 


Ur Leukocyte Esterase  LARGE  (NEGATIVE)  H  09/11/20  18:22    


 


Urine RBC  11-25 /HPF (0-5)  H  09/11/20  18:22    


 


Urine WBC  11-25 /HPF (0-3)  H  09/11/20  18:22    


 


Ur Squamous Epith Cells  NONE SEEN  (<= Few)   09/11/20  18:22    


 


Urine Bacteria  Many /HPF (None Seen)  H  09/11/20  18:22    


 


Ur Microscopic Review  INDICATED   09/11/20  18:22    


 


Urine Culture Comments  INDICATED   09/11/20  18:22    














Sepsis Event Note (H)





- Evaluation


Current Stage of Sepsis: Sepsis


Possible source of Sepsis: positive: Genitourinary





- Sepsis Criteria


Sepsis Criteria: Recorded Temperature greater than 38.3C or Less than 36C, 

Recorded Heart Rate greater than 90 bpm, WBC count greater than 10% bands, 

Metabolic: lactate > 2 mmol/L

## 2020-09-13 NOTE — CONSULTATION NOTE
Referring Provider


Name of Referring Provider:: Dr. Pily Castro MD


Consult Date: 20





Chief Complaint





- Chief Complaint


Chief Complaint: Scrotal swelling





History of Present Illness





- History Obtained From


Records Reviewed: EMR


History obtained from: EMR, Hospitalist, Patient


Exam Limitations: Minimal





- History of Present Illness


HPI Comment/Other: 





75-year-old male with history of a Atrial fibrillation on pradaxa, CVA and 

cognitive deficit as a result.  





History of neurogenic bladder for which he performs self-catheterization 

regularly.





He presented to the ED because his Pineda catheter was replaced after which he 

developed gross hematuria, and ultimately developed suprapubic pain, fever, 

lactic acidosis as well as leukocytosis.





Consequently he was admitted for treatment of presumptive uroepsis. 





He was noted for scrotal edema and work-up was performed.  Suspected 

spermatocele surgery was consulted.





History





- Past Medical History


Cardiovascular: reports: Hypertension, High cholesterol, Atrial fibrillation


Respiratory: reports: None


Neuro: reports: Dementia, CVA


Endocrine/Autoimmune: reports: None


GI: reports: None


: reports: Benign prostate hypertrophy, Retention, Incontinence, Indwelling 

catheter


Psych: reports: Anxiety, Other


Musculoskeletal: reports: Chronic back pain


Derm: reports: None


MRSA Hx?: No





- Past Surgical History


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


Ortho: reports: Hip replacement, Knee replacement


HEENT: reports: Tonsil/Adenoidectomy, Other





- Family & Social History


Family History: Mother: , CAD, Diabetes, Type 2, Father: , 

Alzheimer's Disease


Family History Comment/Other: father: dementia.  at age 93.  mother: 

Diabetes mellitus and CHF


Social History Notes: 30+ smoking history. Quit in .  Rarely drinks alcohol.

No illicit drugs





- POLST


Patient has POLST: No


POLST Status: Full Code





Meds/Allgy





- Home Medications


Home Medications: 


                                Ambulatory Orders











 Medication  Instructions  Recorded  Confirmed


 


Dabigatran [Pradaxa] 150 mg PO BID 09/22/13 03/15/20


 


Finasteride 5 mg PO DAILY 03/14/20 03/15/20


 


polyethylene glycoL 3350 [Miralax] 17 gm PO DAILY PRN 20














- Allergies


Allergies/Adverse Reactions: 


                                    Allergies











Allergy/AdvReac Type Severity Reaction Status Date / Time


 


No Known Drug Allergies Allergy   Verified 06/10/20 15:01














Review of Systems





- Constitutional


Constitutional: reports: Other (See hospitalist note for specifics of presenting

 review of systems.)





- Eyes


Eyes: reports: Other (See hospitalist note for specifics of presenting review of

 systems.)





- Ears, Nose & Throat


Ears, Nose & Throat: reports: Other (See hospitalist note for specifics of 

presenting review of systems.)





- Cardiovascular


Cariovascular: reports: Other (See hospitalist note for specifics of presenting 

review of systems.)





- Respiratory


Respiratory: reports: Other (See hospitalist note for specifics of presenting 

review of systems.)





- Gastrointestinal


Gastrointestinal: reports: Other (See hospitalist note for specifics of 

presenting review of systems.)





- Genitourinary


Genitourinary: reports: Other (Denies any scrotal pain, redness, discharge, or 

other complication in recent days other than his presenting symptoms as a 

relates to his historic bladder concerns.)





- Musculoskeletal


Musculoskeletal: reports: Other (See hospitalist note for specifics of 

presenting review of systems.)





- Integumentary


Integumentary: reports: Other (See hospitalist note for specifics of presenting 

review of systems.)





- Neurological


Neurological: reports: Other (See hospitalist note for specifics of presenting 

review of systems. He is awake and alert with no gross sensorimotor deficits on 

my focused exam.)





- Psychiatric


Psychiatric: reports: Other (See hospitalist note for specifics of presenting 

review of systems.)





- Endocrine


Endocrine: reports: Other (See hospitalist note for specifics of presenting 

review of systems.)





- Hematologic/Lymphatic


Hematologic/Lymphatic: reports: Other (See hospitalist note for specifics of 

presenting review of systems.)





Exam





- Vital Signs


Vital Signs: 





                                Vital Signs x48h











  Temp Pulse Pulse Resp BP BP Pulse Ox


 


 20 12:08  37.1 C  85   20   118/83 H  98


 


 20 08:03  36.7 C   93  18  148/93 H   97














- Physical Exam


General Appearance: positive: No acute distress, Alert


Eyes Bilateral: positive: Normal inspection, PERRL, EOMI


ENT: positive: ENT inspection nml


Neck: positive: Nml inspection


Respiratory: positive: Chest non-tender, No respiratory distress, Breath sounds 

nml.  negative: Wheezes, Rales, Rhonchi


Abdomen: positive: Non-tender.  negative: Tenderness, Guarding, Rebound


Rectal: positive: Other (No perianal tenderness, no palpable cords, no areas of 

palpable fluctuance, no peritoneal pathology on inspection.  JOANNA was deferred.)


Extremities: positive: Non-tender, Full ROM


Neurologic/Psychiatric: positive: CN's nml (2-12)


Comments/Other: 





Scrotal exam was as follows:





Palpable testicle on left, no palpable hernia bilaterally, area of fullness on 

the right without significant pain, no warmth, no concerns for tracking 

infection, no blanching erythema.





Right testicle palpable mobile as well with no associated pain.  Fullness 

consistent with either hydrocele and/or spermatocele.  No firmness concerning 

for malignancy on physical exam.








Conclusion/Plan





- Diagnosis


Diagnosis: 1.  History of CVA.  2.  Systemic anticoagulation.  3.  Atrial fibr

illation.  4.  Neurogenic bladder.  5.  Urosepsis.  6.  Spermatocele versus 

hydrocele





- Plan


Plan: 





75-year-old male with for urosepsis with multiple comorbid states including 

atrial fibrillation, history of CVA, systemic anticoagulation.





Right scrotal swelling on imaging without any concerns on CT for herniation.  

Patient with no obstructive symptoms from a gastrointestinal standpoint as well.





As it relates to his scrotal ultrasound, concern for spermatocele however could 

also be hydrocele.  In either case absent any significant symptomatology would d

efer any operative intervention.





Would only consider these in someone with chronic discomfort and given this 

patient's age, comorbid states, and complicating features and absence any 

concerns for any infectious etiology which would merit emergent intervention 

especially as relates to a tracking peroneal/perianal infection for which he has

 no evidence, would defer any surgical intervention at this time.





Please note that voice recognition software was used to transcribe this note and

 inadvertent errors might persist in spite of review and editing.  I am obliged 

to you for your attention.  I am thankful to you for allowing me to participate 

with you in this care of this patient.








- Lab Results


Fish Bones: 


                                 20 05:07





                                 20 05:07

## 2020-09-14 LAB
ANION GAP SERPL CALCULATED.4IONS-SCNC: 5 MMOL/L (ref 6–13)
BASOPHILS NFR BLD AUTO: 0.1 10^3/UL (ref 0–0.1)
BASOPHILS NFR BLD AUTO: 0.5 %
BUN SERPL-MCNC: 10 MG/DL (ref 6–20)
CALCIUM UR-MCNC: 8.3 MG/DL (ref 8.5–10.3)
CHLORIDE SERPL-SCNC: 107 MMOL/L (ref 101–111)
CO2 SERPL-SCNC: 27 MMOL/L (ref 21–32)
CREAT SERPLBLD-SCNC: 0.8 MG/DL (ref 0.6–1.2)
EOSINOPHIL # BLD AUTO: 0.1 10^3/UL (ref 0–0.7)
EOSINOPHIL NFR BLD AUTO: 0.9 %
ERYTHROCYTE [DISTWIDTH] IN BLOOD BY AUTOMATED COUNT: 13.5 % (ref 12–15)
GLUCOSE SERPL-MCNC: 107 MG/DL (ref 70–100)
HGB UR QL STRIP: 12.8 G/DL (ref 14–18)
LYMPHOCYTES # SPEC AUTO: 0.8 10^3/UL (ref 1.5–3.5)
LYMPHOCYTES NFR BLD AUTO: 7.1 %
MAGNESIUM SERPL-MCNC: 2 MG/DL (ref 1.7–2.8)
MCH RBC QN AUTO: 32.4 PG (ref 27–31)
MCHC RBC AUTO-ENTMCNC: 33.3 G/DL (ref 32–36)
MCV RBC AUTO: 97.2 FL (ref 80–94)
MONOCYTES # BLD AUTO: 0.7 10^3/UL (ref 0–1)
MONOCYTES NFR BLD AUTO: 6 %
NEUTROPHILS # BLD AUTO: 9.9 10^3/UL (ref 1.5–6.6)
NEUTROPHILS # SNV AUTO: 11.6 X10^3/UL (ref 4.8–10.8)
NEUTROPHILS NFR BLD AUTO: 85.1 %
PDW BLD AUTO: 11.4 FL (ref 7.4–11.4)
PLATELET # BLD: 164 10^3/UL (ref 130–450)
RBC MAR: 3.95 10^6/UL (ref 4.7–6.1)
SODIUM SERPLBLD-SCNC: 139 MMOL/L (ref 135–145)

## 2020-09-14 RX ADMIN — SODIUM CHLORIDE SCH MLS/HR: 9 INJECTION, SOLUTION INTRAVENOUS at 22:07

## 2020-09-14 RX ADMIN — SODIUM CHLORIDE, PRESERVATIVE FREE SCH: 5 INJECTION INTRAVENOUS at 10:07

## 2020-09-14 RX ADMIN — SODIUM CHLORIDE, PRESERVATIVE FREE SCH: 5 INJECTION INTRAVENOUS at 20:55

## 2020-09-14 RX ADMIN — SODIUM CHLORIDE SCH MLS/HR: 9 INJECTION, SOLUTION INTRAVENOUS at 01:13

## 2020-09-14 RX ADMIN — SODIUM CHLORIDE, PRESERVATIVE FREE SCH: 5 INJECTION INTRAVENOUS at 01:14

## 2020-09-14 RX ADMIN — SODIUM CHLORIDE SCH MLS/HR: 9 INJECTION, SOLUTION INTRAVENOUS at 11:13

## 2020-09-14 RX ADMIN — PANTOPRAZOLE SODIUM SCH MG: 40 TABLET, DELAYED RELEASE ORAL at 06:46

## 2020-09-14 RX ADMIN — ACETAMINOPHEN PRN MG: 325 TABLET ORAL at 20:55

## 2020-09-14 RX ADMIN — DEXTROSE MONOHYDRATE SCH MLS/HR: 50 INJECTION, SOLUTION INTRAVENOUS at 10:02

## 2020-09-14 RX ADMIN — DABIGATRAN ETEXILATE MESYLATE SCH MG: 75 CAPSULE ORAL at 10:06

## 2020-09-14 RX ADMIN — DABIGATRAN ETEXILATE MESYLATE SCH MG: 75 CAPSULE ORAL at 20:55

## 2020-09-14 RX ADMIN — FINASTERIDE SCH MG: 5 TABLET, FILM COATED ORAL at 10:04

## 2020-09-14 RX ADMIN — OXYCODONE PRN MG: 5 TABLET ORAL at 20:55

## 2020-09-14 NOTE — PHARMACY PROGRESS NOTE
- Best Possible Medication History


Admit Date and Time: 09/11/20 2021


Processed by: Pharmacy


Medication History completed: Yes


Patient Interview: Pt unable to participate


Secondary Source(s): Spouse/Significant other





As the person ultimately responsible for medication therapy, providers are able 

to order a medication from an existing home medication list in [facility] via the 

"Reconcile Routine" prior to Confirmation of that medication by support staff. 

Such practice is discouraged except when the physician, in their clinical 

judgment, deems that a medical need exists for a medication without regard to 

previous use.

## 2020-09-14 NOTE — PROVIDER PROGRESS NOTE
Assessment/Plan





- Problem List


(1) E coli bacteremia


Assessment/Plan: 


The blood culture bacteria was identified as E coli.


We are awaiting sensitivities to determine duration of treatment and what 

antibiotic will be planned.








(2) E. coli UTI


Assessment/Plan: 


We are awaiting the sensitivities of this E. coli, found on blood cultures, to 

ascertain if it is the same as the urine, and this will determine the plan for 

antibiotic treatment. He can then be changed to oral antibiotics.  


His course of treatment is planned for 3 to 4 weeks since he has a complicated 

UTI as the source.








(3) Complication, blocked Pineda catheter


Qualifiers: 


   Encounter type: subsequent encounter   Qualified Code(s): T83.091D - Other 

mechanical complication of indwelling urethral catheter, subsequent encounter   


Assessment/Plan: 


 Patient had presented after his Pineda was changed by his Home Health agency, 

and apparently he had to wait 5 weeks for this to be done.


The patient's wife would like a new and different Home Health agency referral 

placed, since she said the Urologist wants the chronic Pineda to be changed every

3 to 4 weeks. A Home [facility] referral will be ordered.








(4) Spermatocele of epididymis, single


Assessment/Plan: 


He had tenderness localized to the scrotum when examined by the admitting 

Hospitalist.


Ultrasound of the testicles was ordered and shows a large, hypoechoic, septated 

mass of the right side, which is a spermatocele.


A consultation regarding management from General Surgery was done, outpatient 

management was advised. 








(5) Neurogenic bladder as late effect of cerebrovascular accident (CVA)


Assessment/Plan: 


As per history.  He used to have to self catheterize himself, and by report, he 

did this every 45 minutes, probably from his dementia.


Now he has a chronic indwelling Pineda.








(6) Chronic indwelling Pineda catheter


Assessment/Plan: 


As per Hx.


The Pineda was changed on the day that led up to the fever, and presentation to 

the ER.


Pineda was blocked and was replaced in the ER this admission.











(7) BPH (benign prostatic hyperplasia)


Assessment/Plan: 


BPH meds have been ordered to use here








(8) Dementia


Assessment/Plan: 


He has dementia with behavioral disturbances, needs alot of supervision here.


24-hour retirement care is needed after discharge. Social Work and discharge RN 

have been in contact with the wife, his caregiver.








(9) Chronic atrial fibrillation


Assessment/Plan: 


His heart rate is under control, without any heart rate slowing meds; this is a 

sign of conduction system disease but there are no bradycardias that are 

concerning.


He is on Pradaxa for stroke prophylaxis.








(10) Sepsis


Assessment/Plan: 


Resolved








- Current Meds


Current Meds: 





                               Current Medications











Generic Name Dose Route Start Last Admin





  Trade Name Freq  PRN Reason Stop Dose Admin


 


Acetaminophen  650 mg  09/11/20 22:16  09/13/20 21:09





  Tylenol  PO   650 mg





  Q4HR PRN   Administration





  Pain 1 to 4  


 


Dabigatran  150 mg  09/12/20 21:00  09/14/20 10:06





  Pradaxa  PO   150 mg





  BID SEMAJ   Administration


 


Finasteride  5 mg  09/13/20 09:00  09/14/20 10:04





  Proscar  PO   5 mg





  DAILY SEMAJ   Administration


 


Sodium Chloride  1,000 mls @ 100 mls/hr  09/11/20 21:00  09/14/20 11:13





  Normal Saline 0.9%  IV   100 mls/hr





  .Q10H SEMAJ   Administration


 


Ceftriaxone Sodium 1 gm/  100 mls @ 200 mls/hr  09/12/20 09:00  09/14/20 11:51





  Sodium Chloride  IV   Infused





  DAILY SEMAJ   Infusion


 


Ondansetron HCl  4 mg  09/12/20 04:02  09/12/20 04:13





  Zofran Inj  IVP   4 mg





  Q4HR PRN   Administration





  Nausea / Vomiting  


 


Oxycodone HCl  5 mg  09/11/20 20:21  09/13/20 07:52





  Roxicodone  PO   5 mg





  Q4HR PRN   Administration





  Pain 5 to 7  


 


Pantoprazole Sodium  40 mg  09/12/20 07:00  09/14/20 06:46





  Protonix  PO   40 mg





  QDAC SEMAJ   Administration


 


Polyethylene Glycol  17 gm  09/13/20 12:00  09/14/20 11:50





  Miralax  PO   Not Given





  DAILY SEMAJ  


 


Sodium Chloride  10 ml  09/12/20 01:00  09/14/20 10:07





  Normal Saline Flush 0.9%  IVP   Not Given





  0100,0900,1700 SEMAJ  














- Lab Result


Fish Bone Diagrams: 


                                 09/15/20 05:40





                                 09/15/20 05:40





- Additional Planning


My Orders: 





My Active Orders





09/14/20 11:06


Miscellaenous Nursing Order [RC] QSHIFT 





09/14/20 Lunch


Cardiac Diet [DIET] 














Subjective





- Subjective


Patient Reports: Resting Comfortably


Nursing Reports: Other (He climbs out of bed without calling for assistance.)





Objective


Vital Signs: 





                               Vital Signs - 24 hr











  09/13/20 09/13/20 09/14/20





  21:00 23:55 03:45


 


Temperature 37.2 C 36.6 C 36.3 C L


 


Heart Rate [   





Activity]   


 


Heart Rate [ 52 L  





Brachial]   


 


Heart Rate [  81 95





Monitoring   





electrodes]   


 


Heart Rate [   





Sitting]   


 


Heart Rate [   





Supine]   


 


Respiratory 19 16 16





Rate   


 


Blood Pressure   





[Activity]   


 


Blood Pressure  150/67 H 159/91 H





[Left Brachial   





artery]   


 


Blood Pressure 154/91 H  





[Right Brachial   





artery]   


 


Blood Pressure   





[Sitting]   


 


Blood Pressure   





[Supine]   


 


O2 Saturation 100 97 96














  09/14/20 09/14/20 09/14/20





  07:07 10:35 11:11


 


Temperature 36.8 C  37.2 C


 


Heart Rate [  72 





Activity]   


 


Heart Rate [   





Brachial]   


 


Heart Rate [ 89  92





Monitoring   





electrodes]   


 


Heart Rate [  75 





Sitting]   


 


Heart Rate [  81 





Supine]   


 


Respiratory 18  18





Rate   


 


Blood Pressure  114/64 





[Activity]   


 


Blood Pressure   





[Left Brachial   





artery]   


 


Blood Pressure 166/99 H  147/91 H





[Right Brachial   





artery]   


 


Blood Pressure  108/70 





[Sitting]   


 


Blood Pressure  166/117 H 





[Supine]   


 


O2 Saturation 97  94














  09/14/20





  17:00


 


Temperature 37.0 C


 


Heart Rate [ 





Activity] 


 


Heart Rate [ 51 L





Brachial] 


 


Heart Rate [ 





Monitoring 





electrodes] 


 


Heart Rate [ 





Sitting] 


 


Heart Rate [ 





Supine] 


 


Respiratory 18





Rate 


 


Blood Pressure 





[Activity] 


 


Blood Pressure 





[Left Brachial 





artery] 


 


Blood Pressure 150/70 H





[Right Brachial 





artery] 


 


Blood Pressure 





[Sitting] 


 


Blood Pressure 





[Supine] 


 


O2 Saturation 99








                                     Oxygen











O2 Source [With Activity]      Room air


 


O2 Source                      Room air














I&O (Last 24 Hrs): 





                          Intake and Output Totals x24h











 09/12/20 09/13/20 09/14/20





 23:59 23:59 23:59


 


Intake Total 2176.667 3996.667 2083.333


 


Output Total 2425 2050 2650


 


Balance -615.868 4451.667 -566.667














- Results


Results: 





                               Laboratory Results











WBC  11.6 x10^3/uL (4.8-10.8)  H  09/14/20  05:07    


 


RBC  3.95 10^6/uL (4.70-6.10)  L  09/14/20  05:07    


 


Hgb  12.8 g/dL (14.0-18.0)  L  09/14/20  05:07    


 


Hct  38.4 % (42.0-52.0)  L  09/14/20  05:07    


 


MCV  97.2 fL (80.0-94.0)  H  09/14/20  05:07    


 


MCH  32.4 pg (27.0-31.0)  H  09/14/20  05:07    


 


MCHC  33.3 g/dL (32.0-36.0)   09/14/20  05:07    


 


RDW  13.5 % (12.0-15.0)   09/14/20  05:07    


 


Plt Count  164 10^3/uL (130-450)   09/14/20  05:07    


 


MPV  11.4 fL (7.4-11.4)   09/14/20  05:07    


 


Neut # (Auto)  9.9 10^3/uL (1.5-6.6)  H  09/14/20  05:07    


 


Lymph # (Auto)  0.8 10^3/uL (1.5-3.5)  L  09/14/20  05:07    


 


Mono # (Auto)  0.7 10^3/uL (0.0-1.0)   09/14/20  05:07    


 


Eos # (Auto)  0.1 10^3/uL (0.0-0.7)   09/14/20  05:07    


 


Baso # (Auto)  0.1 10^3/uL (0.0-0.1)   09/14/20  05:07    


 


Absolute Nucleated RBC  0.00 x10^3/uL  09/14/20  05:07    


 


Total Counted  100   09/12/20  04:40    


 


Band Neuts % (Manual)  3 % (0-10)  09/12/20  04:40    


 


Abnorm Lymph % (Manual)  0 %  09/12/20  04:40    


 


Nucleated RBC %  0.0 /100WBC  09/14/20  05:07    


 


Neutrophils # (Manual)  27.5 10^3/uL (1.5-6.6)  H  09/12/20  04:40    


 


Lymphocytes # (Manual)  0.3 10^3/uL (1.5-3.5)  L  09/12/20  04:40    


 


Monocytes # (Manual)  0.9 10^3/uL (0.0-1.0)   09/12/20  04:40    


 


Eosinophils # (Manual)  0.0 10^3/uL (0-0.7)   09/12/20  04:40    


 


Basophils # (Manual)  0.0 10^3/uL (0-0.1)   09/12/20  04:40    


 


Differential Comment  MANUAL DIFFERENTIAL   09/12/20  04:40    


 


WBC Morphology  NORMAL APPEARANCE  (NORMAL)   09/12/20  04:40    


 


Platelet Estimate  NORMAL (130-450,000)  (NORMAL)   09/12/20  04:40    


 


Platelet Morphology  NORMAL APPEARANCE  (NORMAL)   09/12/20  04:40    


 


RBC Morph Micro Appear  NORMAL APPEARANCE  (NORMAL)   09/12/20  04:40    


 


Sodium  139 mmol/L (135-145)   09/14/20  05:07    


 


Potassium  3.7 mmol/L (3.5-5.0)   09/14/20  05:07    


 


Chloride  107 mmol/L (101-111)   09/14/20  05:07    


 


Carbon Dioxide  27 mmol/L (21-32)   09/14/20  05:07    


 


Anion Gap  5.0  (6-13)  L  09/14/20  05:07    


 


BUN  10 mg/dL (6-20)   09/14/20  05:07    


 


Creatinine  0.8 mg/dL (0.6-1.2)   09/14/20  05:07    


 


Estimated GFR (MDRD)  94  (>89)   09/14/20  05:07    


 


Glucose  107 mg/dL ()  H  09/14/20  05:07    


 


Lactic Acid  1.5 mmol/L (0.5-2.2)   09/11/20  20:40    


 


Calcium  8.3 mg/dL (8.5-10.3)  L  09/14/20  05:07    


 


Magnesium  2.0 mg/dL (1.7-2.8)   09/14/20  05:07    


 


Total Bilirubin  0.7 mg/dL (0.2-1.0)   09/11/20  18:15    


 


AST  25 IU/L (10-42)   09/11/20  18:15    


 


ALT  16 IU/L (10-60)   09/11/20  18:15    


 


Alkaline Phosphatase  53 IU/L ()   09/11/20  18:15    


 


Total Protein  7.1 g/dL (6.7-8.2)   09/11/20  18:15    


 


Albumin  3.9 g/dL (3.2-5.5)   09/11/20  18:15    


 


Globulin  3.2 g/dL (2.1-4.2)   09/11/20  18:15    


 


Albumin/Globulin Ratio  1.2  (1.0-2.2)   09/11/20  18:15    


 


Lipase  39 U/L (22-51)   09/11/20  18:15    


 


Urine Color  LT RED   09/11/20  18:22    


 


Urine Clarity  HAZY  (CLEAR)   09/11/20  18:22    


 


Urine pH  6.0 PH (5.0-7.5)   09/11/20  18:22    


 


Ur Specific Gravity  1.010  (1.002-1.030)   09/11/20  18:22    


 


Urine Protein  NEGATIVE mg/dL (NEGATIVE)   09/11/20  18:22    


 


Urine Glucose (UA)  NEGATIVE mg/dL (NEGATIVE)   09/11/20  18:22    


 


Urine Ketones  NEGATIVE mg/dL (NEGATIVE)   09/11/20  18:22    


 


Urine Occult Blood  LARGE  (NEGATIVE)  H  09/11/20  18:22    


 


Urine Nitrite  NEGATIVE  (NEGATIVE)   09/11/20  18:22    


 


Urine Bilirubin  NEGATIVE  (NEGATIVE)   09/11/20  18:22    


 


Urine Urobilinogen  0.2 (NORMAL) E.U./dL (NORMAL)   09/11/20  18:22    


 


Ur Leukocyte Esterase  LARGE  (NEGATIVE)  H  09/11/20  18:22    


 


Urine RBC  11-25 /HPF (0-5)  H  09/11/20  18:22    


 


Urine WBC  11-25 /HPF (0-3)  H  09/11/20  18:22    


 


Ur Squamous Epith Cells  NONE SEEN  (<= Few)   09/11/20  18:22    


 


Urine Bacteria  Many /HPF (None Seen)  H  09/11/20  18:22    


 


Ur Microscopic Review  INDICATED   09/11/20  18:22    


 


Urine Culture Comments  INDICATED   09/11/20  18:22    














Sepsis Event Note (H)





- Evaluation


Current Stage of Sepsis: Sepsis


Possible source of Sepsis: positive: Genitourinary





- Sepsis Criteria


Sepsis Criteria: Recorded Temperature greater than 38.3C or Less than 36C, 

Recorded Heart Rate greater than 90 bpm, WBC count greater than 10% bands, 

Metabolic: lactate > 2 mmol/L

## 2020-09-15 VITALS — DIASTOLIC BLOOD PRESSURE: 88 MMHG | SYSTOLIC BLOOD PRESSURE: 145 MMHG

## 2020-09-15 LAB
ANION GAP SERPL CALCULATED.4IONS-SCNC: 9 MMOL/L (ref 6–13)
BASOPHILS NFR BLD AUTO: 0.1 10^3/UL (ref 0–0.1)
BASOPHILS NFR BLD AUTO: 0.7 %
BUN SERPL-MCNC: 9 MG/DL (ref 6–20)
CALCIUM UR-MCNC: 8.4 MG/DL (ref 8.5–10.3)
CHLORIDE SERPL-SCNC: 108 MMOL/L (ref 101–111)
CO2 SERPL-SCNC: 23 MMOL/L (ref 21–32)
CREAT SERPLBLD-SCNC: 0.7 MG/DL (ref 0.6–1.2)
EOSINOPHIL # BLD AUTO: 0.2 10^3/UL (ref 0–0.7)
EOSINOPHIL NFR BLD AUTO: 1.8 %
ERYTHROCYTE [DISTWIDTH] IN BLOOD BY AUTOMATED COUNT: 13.5 % (ref 12–15)
GLUCOSE SERPL-MCNC: 86 MG/DL (ref 70–100)
HGB UR QL STRIP: 13.3 G/DL (ref 14–18)
LYMPHOCYTES # SPEC AUTO: 1.9 10^3/UL (ref 1.5–3.5)
LYMPHOCYTES NFR BLD AUTO: 16.6 %
MCH RBC QN AUTO: 31.7 PG (ref 27–31)
MCHC RBC AUTO-ENTMCNC: 32.4 G/DL (ref 32–36)
MCV RBC AUTO: 97.9 FL (ref 80–94)
MONOCYTES # BLD AUTO: 0.6 10^3/UL (ref 0–1)
MONOCYTES NFR BLD AUTO: 5.6 %
NEUTROPHILS # BLD AUTO: 8.5 10^3/UL (ref 1.5–6.6)
NEUTROPHILS # SNV AUTO: 11.4 X10^3/UL (ref 4.8–10.8)
NEUTROPHILS NFR BLD AUTO: 74.6 %
PDW BLD AUTO: 11.4 FL (ref 7.4–11.4)
PLATELET # BLD: 174 10^3/UL (ref 130–450)
RBC MAR: 4.19 10^6/UL (ref 4.7–6.1)
SODIUM SERPLBLD-SCNC: 140 MMOL/L (ref 135–145)

## 2020-09-15 RX ADMIN — DABIGATRAN ETEXILATE MESYLATE SCH MG: 75 CAPSULE ORAL at 08:46

## 2020-09-15 RX ADMIN — SODIUM CHLORIDE SCH MLS/HR: 9 INJECTION, SOLUTION INTRAVENOUS at 08:45

## 2020-09-15 RX ADMIN — SODIUM CHLORIDE, PRESERVATIVE FREE SCH: 5 INJECTION INTRAVENOUS at 09:13

## 2020-09-15 RX ADMIN — ACETAMINOPHEN PRN MG: 325 TABLET ORAL at 08:50

## 2020-09-15 RX ADMIN — PANTOPRAZOLE SODIUM SCH MG: 40 TABLET, DELAYED RELEASE ORAL at 08:50

## 2020-09-15 RX ADMIN — SODIUM CHLORIDE, PRESERVATIVE FREE SCH: 5 INJECTION INTRAVENOUS at 03:43

## 2020-09-15 RX ADMIN — FINASTERIDE SCH MG: 5 TABLET, FILM COATED ORAL at 08:50

## 2020-09-15 NOTE — DISCHARGE SUMMARY
Discharge Summary


Admit Date: 09/11/20


Discharge Date: 09/15/20


Discharging Provider: Rosario Kimble MD


Primary Care Provider: Luis Neff MD


Code Status: Attempt Resuscitation


Condition at Discharge: Fair


Discharge Disposition: 06 Home Health Service





- DIAGNOSES


Discharge Diagnoses with Status of Each Condition: 


1.  Sepsis


2.  E. coli UTI


3.  E. coli bacteremia


4.  Complication, blocked Pineda catheter present on admission with status of 

chronic indwelling Pineda catheter


5.  Spermatocele of epididymis


6.  Neurogenic bladder as late effect of stroke


7.  Benign prostatic hypertrophy with lower urinary tract symptoms of 

obstruction


8.  Dementia


9.  Chronic atrial fibrillation








- HPI


History of Present Illness: 





Patient is a 75-year-old male with history of a Atrial fibrillation on pradaxa, 

CVA and cognitive deficit as a result.  He presented to the ED because his Pineda

catheter was replaced after which he developed gross hematuria.  The catheter 

subsequently became obstructed and the patient started experiencing some 

suprapubic pain.  During evaluation in the ED he was noted to have chills and a 

fever with a temperature as high as 38.6C.  He had about 24% bands on his CBC 

and a lactic acid of 4.3


Consequently he was presented for admission for treatment of sepsis due to UTI. 

The patient was last admitted in March 2020 with a UTI


At bedside patient is alert and awake but not oriented to place time or reason. 

He denies chest pain, dyspnea, abdominal pain, nausea, vomiting.  He has 

significant scrotal edema on exam.  Pineda catheter is in place draining urine 

which appears clear but with some reddish sedimentation in the tubing.


Attempts to reach his wife for more information regarding his presentation has 

been unsuccessful.  A message was left on the voicemail to call back.  The 

history above was mainly obtained from the ED physician and the ED physicians 

documentation.





History





- Past Medical History


Cardiovascular: reports: Hypertension, High cholesterol, Atrial fibrillation


Respiratory: reports: None


Neuro: reports: Dementia, CVA


Endocrine/Autoimmune: reports: None


GI: reports: None


: reports: Benign prostate hypertrophy, Retention, Incontinence, Indwelling 

catheter


Psych: reports: Anxiety, Other


Musculoskeletal: reports: Chronic back pain


Derm: reports: None


MRSA Hx?: No





- Past Surgical History


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


Ortho: reports: Hip replacement, Knee replacement


HEENT: reports: Tonsil/Adenoidectomy, Other





- CONSULTS | PROCEDURES


Procedures: 


1.  Abdomen/pelvis CT without contrast.  Bladder wall thickening and 

perivesicular inflammation consistent with cystitis and potentially superimposed

chronic outlet obstruction.  There is a possible 5.6 cm bladder diverticulum.  

No CT evidence of obstruction to of uropathy.  Small paraesophageal hiatal 

hernia.  Postsplenectomy splenosis.





2.  Scrotum ultrasound with large hypoechoic focus with septations surrounding 

the right testicle and the scrotal sac.  Overall appearance suggestive of large 

spermatocele.





3.  Blood culture September 11 with E. coli





4.  Urine culture with E. coli.








- HOSPITAL COURSE


Hospital Course: 


During his stay, he was treated with IV antibiotics.  Sepsis criteria resolved. 

Antibiotics were de-escalated once E. coli was identified.  Because of good 

tissue tendon penetration, he is a candidate for oral quinolone to go home on 

for up to 4 weeks.  Chronic atrial fibrillation roommate controlled.  Remained 

on Pradaxa for stroke prophylaxis.  General surgery saw the patient for an 

enlarged scrotal sac and he was I diagnosed with a spermatocele.  He was kept on

Proscar and Flomax added for his history of benign prostatic hypertrophy.  His 

wife, power of , made it clear that she was not happy with the [facility] agency that was following her  for his Pineda catheter change.  She 

requested a new home health agency and that was done.  As such physical therapy 

is requesting he go home with a walker.  He will need RN for Pineda, PT/OT, and a

bath aide. She is also requesting the patient be referred to urology at [facility].  We have explained to her in the discharge instructions that his primary

care provider, Luis Neff, can make that referral.  She describes a situation 

where his dementia is relatively stable with regards to behavior.  However there

are outburst that he has at home.  She finds those deeply unsettling.





At discharge the patient was upright in bed.  Wrinkling his nose at breakfast 

saying that it did not taste good.  He was also angry with his wife.  Said that 

she was not allowing him to speak to their children and keeping him "a prisoner 

in his own home.  I do not know if there is delusions or paranoia with his 

dementia.  His temperature was 37.  Pulse 78.  Blood pressure 145/88.  

Respirations 20.  98% on room air.  He is a 5 foot 10 inch white male, 84 kg.  

Looks his stated age.  Well-nourished well-developed.  Neck is supple without 

JVD.  Lungs are clear to auscultation and percussion.  He has a slow regular 

rate and rhythm.  To me he sounds like he is in sinus but he does have a history

of A. fib.  Abdomen is soft, nontender.  Normal bowel sounds.  No masses.  No 

suprapubic tenderness.  The Pineda is in place draining clear yellow urine.  He 

is confused, very forgetful.  He is oriented to who he is and that he is in 

[place], and that this is the hospital.  But not why he is here.  Or the 

date.  He is able to feed himself.  Stand but has balance issues. 





Greater than 30 minutes was spent coordinating discharge.





- ALLERGIES


Allergies/Adverse Reactions: 


                                    Allergies











Allergy/AdvReac Type Severity Reaction Status Date / Time


 


No Known Drug Allergies Allergy   Verified 06/10/20 15:01














- MEDICATIONS


Home Medications: 


                                Ambulatory Orders











 Medication  Instructions  Recorded  Confirmed


 


Dabigatran Etexilate Mesylate 150 mg PO BID 09/14/20 09/14/20





[Pradaxa]   


 


Acetaminophen [Tylenol] 650 mg PO Q4HR PRN  tablet 09/15/20 


 


Ciprofloxacin [Cipro] 500 mg PO BID #120 tablet 09/15/20 


 


Finasteride [Proscar] 5 mg PO DAILY  tablet 09/15/20 


 


Tamsulosin [Flomax] 0.4 mg PO DAILY #30 capsule 09/15/20 


 


Walker [Ultra-Light Rollator] 1 each MC DAILY #1 each 09/15/20 














- LABS


Result Diagrams: 


                                 09/15/20 05:40





                                 09/15/20 05:40





- SEPSIS


Current Stage of Sepsis: Sepsis


Possible source of Sepsis: Genitourinary


Sepsis Criteria: Recorded Temperature greater than 38.3C or Less than 36C, 

Recorded Heart Rate greater than 90 bpm, WBC count greater than 10% bands, 

Metabolic: lactate > 2 mmol/L

## 2020-09-15 NOTE — DISCHARGE PLAN
Discharge Plan


Problem Reviewed?: Yes


Disposition: 06 Home Health Service


Condition: Fair


Prescriptions: 


Ciprofloxacin [Cipro] 500 mg PO BID #120 tablet


Tamsulosin [Flomax] 0.4 mg PO DAILY #30 capsule


Walker [Ultra-Light Rollator] 1 each MC DAILY #1 each


Diet: Cardiac


Activity Restrictions: Activity as Tolerated


Shower Restrictions: No


Driving Restrictions: Yes (no driving)


Assistance Devices: Walker


Health Concerns: 


You have an enlarged prostate and you are unable to urinate normally because of 

it.  Due to that you have a chronic indwelling Pineda catheter.  Your catheter 

gets changed periodically by [facility].  In the last few days before he 

came to the hospital, your catheter became blocked, and you began having bladder

pain.  You had chills, fever, and increasing confusion.  In the emergency room 

you were identified as having an obstructed bladder, resulting in sepsis with a 

urinary tract infection.  You also have a large spermatocele in 1 of your 

testicles.  You were treated with IV fluids, antibiotics, and seen by Dr. Jauregui (general surgery) for the spermatocele.  Unfortunately you do have some 

memory loss, and that can result in occasional behavioral problems.


Plan of Treatment: 


1.  You will need antibiotics for at least 4 weeks because of your large 

prostate resulting in a urinary tract infection


2.  You are requesting referral to [facility] u[facility] services.  However, we

cannot make referrals directly.  We are asking that Dr. Zamorano, your primary ca

re provider, make that referral to the [facility].  We will notify his 

office of your request.


3.  You have requested a change from [facility] to [facility].  

We have written that order.


4.  We have asked would be [facility] to change your catheter every 4 weeks.


5.  While you were in the hospital, you have quite a bit of balance problems and

are at risk for falls.  Physical therapy has requested that you received a 

walker.  Since you have [facility] insurance, we cannot send you home with a walker.

 We will be sending a prescription to [facility] and [facility] will send it to your ho

use.


6.  Please see your primary care provider in follow-up in the next 1 to 2 weeks.


Care Goals: 


1.  To hopefully have the Pineda removed in the near future


2.  To remain stable with regards to confusion and behavior due to dementia


Assessment: 


Patient has memory loss, unable to process discharge instructions.  These 

written instructions will be provided to his wife.


Follow-Up Care: Home Health - RN, Home Health - PT


No Smoking: If you smoke, Please STOP!  Call [phone] for help.


Follow-up with: 


Luis Neff MD [Primary Care Provider] -

## 2020-12-27 ENCOUNTER — HOSPITAL ENCOUNTER (EMERGENCY)
Dept: HOSPITAL 76 - ED | Age: 75
Discharge: HOME | End: 2020-12-27
Payer: MEDICARE

## 2020-12-27 VITALS — SYSTOLIC BLOOD PRESSURE: 149 MMHG | DIASTOLIC BLOOD PRESSURE: 56 MMHG

## 2020-12-27 DIAGNOSIS — I48.91: ICD-10-CM

## 2020-12-27 DIAGNOSIS — I10: ICD-10-CM

## 2020-12-27 DIAGNOSIS — N39.0: Primary | ICD-10-CM

## 2020-12-27 DIAGNOSIS — F03.90: ICD-10-CM

## 2020-12-27 DIAGNOSIS — R31.9: ICD-10-CM

## 2020-12-27 DIAGNOSIS — Z79.01: ICD-10-CM

## 2020-12-27 DIAGNOSIS — R33.8: ICD-10-CM

## 2020-12-27 DIAGNOSIS — N40.1: ICD-10-CM

## 2020-12-27 LAB
CLARITY UR REFRACT.AUTO: (no result)
GLUCOSE UR QL STRIP.AUTO: NEGATIVE MG/DL
KETONES UR QL STRIP.AUTO: NEGATIVE MG/DL
NITRITE UR QL STRIP.AUTO: POSITIVE
PH UR STRIP.AUTO: 6.5 PH (ref 5–7.5)
PROT UR STRIP.AUTO-MCNC: 100 MG/DL
RBC # UR STRIP.AUTO: (no result) /UL
RBC # URNS HPF: (no result) /HPF (ref 0–5)
SP GR UR STRIP.AUTO: 1.02 (ref 1–1.03)
SQUAMOUS URNS QL MICRO: (no result)
UROBILINOGEN UR QL STRIP.AUTO: (no result) E.U./DL
UROBILINOGEN UR STRIP.AUTO-MCNC: NEGATIVE MG/DL

## 2020-12-27 PROCEDURE — 99283 EMERGENCY DEPT VISIT LOW MDM: CPT

## 2020-12-27 PROCEDURE — 87077 CULTURE AEROBIC IDENTIFY: CPT

## 2020-12-27 PROCEDURE — 87086 URINE CULTURE/COLONY COUNT: CPT

## 2020-12-27 PROCEDURE — 81001 URINALYSIS AUTO W/SCOPE: CPT

## 2020-12-27 PROCEDURE — 87181 SC STD AGAR DILUTION PER AGT: CPT

## 2020-12-27 NOTE — ED PHYSICIAN DOCUMENTATION
History of Present Illness





- Stated complaint


Stated Complaint: MALE 





- Chief complaint


Chief Complaint: Abd Pain





- History obtained from


History obtained from: Family (wife)





- Additonal information


Additional information: 





75-year-old man with past medical history of UTI, BPH, straight cath 

intermittently, presents with hematuria for the past 2 days.  He has advanced 

dementia and further history is limited by this.  Wife states that he has not 

had fevers at home.





Review of Systems


Unable to obtain: Dementia





PD PAST MEDICAL HISTORY





- Past Medical History


Past Medical History: Yes


Cardiovascular: Hypertension, High cholesterol, Atrial fibrillation


Respiratory: None


Neuro: Dementia, CVA


Endocrine/Autoimmune: None


GI: None


: Benign prostate hypertrophy, Retention, Incontinence, Indwelling catheter


HEENT: None


Psych: Anxiety, Other


Musculoskeletal: Chronic back pain


Derm: None





- Past Surgical History


Past Surgical History: Yes


General: Splenectomy, Other


Ortho: Hip replacement, Knee replacement


HEENT: Tonsil/Adenoidectomy, Other





- Present Medications


Home Medications: 


                                Ambulatory Orders











 Medication  Instructions  Recorded  Confirmed


 


Dabigatran Etexilate Mesylate 150 mg PO BID 09/14/20 09/14/20





[Pradaxa]   


 


Acetaminophen [Tylenol] 650 mg PO Q4HR PRN  tablet 09/15/20 


 


Ciprofloxacin [Cipro] 500 mg PO BID #120 tablet 09/15/20 


 


Finasteride [Proscar] 5 mg PO DAILY  tablet 09/15/20 


 


Tamsulosin [Flomax] 0.4 mg PO DAILY #30 capsule 09/15/20 


 


Walker [Ultra-Light Rollator] 1 each MC DAILY #1 each 09/15/20 


 


Cefpodoxime Proxetil [Vantin] 200 mg PO Q12H 7 Days #14 tablet 12/27/20 














- Allergies


Allergies/Adverse Reactions: 


                                    Allergies











Allergy/AdvReac Type Severity Reaction Status Date / Time


 


No Known Drug Allergies Allergy   Verified 12/27/20 10:11














- Social History


Does the pt smoke?: No


Smoking Status: Never smoker


Does the pt drink ETOH?: Yes


Does the pt have substance abuse?: No





- Immunizations


Immunizations are current?: Yes





- POLST


Patient has POLST: No


POLST Status: Full Code





PD ED PE NORMAL





- Vitals


Vital signs reviewed: Yes





- General


General: Alert and oriented X 3





- HEENT


HEENT: Atraumatic, PERRL, EOMI





- Neck


Neck: Supple, no meningeal sign





- Cardiac


Cardiac: RRR





- Respiratory


Respiratory: No respiratory distress, Clear bilaterally





- Abdomen


Abdomen: Non tender, Non distended





- Male 


Male : Other (External male genitalia.Treatment, however wife states this is 

at baseline.  Normal cremaster reflex bilaterally)





- Rectal


Rectal: Deferred





- Back


Back: No CVA TTP





- Neuro


Neuro: Other (alert, No acute distress)





- Psych


Psych: Normal mood, Normal affect





Results





- Vitals


Vitals: 


                               Vital Signs - 24 hr











  12/27/20





  10:09


 


Temperature 36.0 C L


 


Heart Rate 91


 


Respiratory 20





Rate 


 


Blood Pressure 149/56 H


 


O2 Saturation 100








                                     Oxygen











O2 Source [With Activity]      Room air


 


O2 Source                      Room air

















- Labs


Labs: 


                                  Microbiology











 12/27/20 10:56 Urine Culture - Preliminary





 Urine,Clean Catch    CULTURE IN PROGRESS. RESULTS TO FOLLOW.








                                Laboratory Tests











  12/27/20





  10:56


 


Urine Color  BROWN


 


Urine Clarity  CLOUDY


 


Urine pH  6.5


 


Ur Specific Gravity  1.025


 


Urine Protein  100 H


 


Urine Glucose (UA)  NEGATIVE


 


Urine Ketones  NEGATIVE


 


Urine Occult Blood  LARGE H


 


Urine Nitrite  POSITIVE H


 


Urine Bilirubin  NEGATIVE


 


Urine Urobilinogen  1 (NORMAL)


 


Ur Leukocyte Esterase  SMALL H


 


Urine RBC  TNTC H


 


Urine WBC  6-10 H


 


Ur Squamous Epith Cells  NONE SEEN


 


Urine Bacteria  Moderate H


 


Urine Culture Comments  INDICATED














PD MEDICAL DECISION MAKING





- ED course


ED course: 





75yM with History of BPH requiring self-catheterization, with prior 

hospitalization for urinary tract infection in September presents with 

asymptomatic hematuria x2 days.  Patient states that he otherwise feels fine.  

Had a CT without any signs of cancer or stones at that time.  His urinalysis 

today shows urinary tract infection.  Will treat with antibiotics.  Shared 

decision made to discharge the patient with the understanding that if any of his

symptoms worsen or if he develops new symptoms he should have a low index to 

return.  Strict return precautions given.  Patient will follow up with his 

urologist.





Departure





- Departure


Disposition: 01 Home, Self Care


Clinical Impression: 


 UTI (urinary tract infection), Hematuria





Condition: Good


Instructions:  ED UTI Cystitis Male


Prescriptions: 


Cefpodoxime Proxetil [Vantin] 200 mg PO Q12H 7 Days #14 tablet


Comments: 


you have Been seen in the emergency department for urinary tract infection.  

Make sure that you use sterile procedure when doing self catheterizations from 

now on.  Return to the ED if you develop fevers, abdominal pain, or any other 

new or worsening symptoms.  Take antibiotics as prescribed. follow up with 

urology. 


Discharge Date/Time: 12/27/20 11:41

## 2021-01-05 ENCOUNTER — HOSPITAL ENCOUNTER (OUTPATIENT)
Dept: HOSPITAL 76 - LAB.S | Age: 76
Discharge: HOME | End: 2021-01-05
Attending: PHYSICIAN ASSISTANT
Payer: MEDICARE

## 2021-01-05 DIAGNOSIS — Z86.2: Primary | ICD-10-CM

## 2021-01-05 DIAGNOSIS — R31.9: ICD-10-CM

## 2021-01-05 LAB
ALBUMIN DIAFP-MCNC: 4.2 G/DL (ref 3.2–5.5)
ALBUMIN/GLOB SERPL: 1.4 {RATIO} (ref 1–2.2)
ALP SERPL-CCNC: 48 IU/L (ref 42–121)
ALT SERPL W P-5'-P-CCNC: 17 IU/L (ref 10–60)
ANION GAP SERPL CALCULATED.4IONS-SCNC: 8 MMOL/L (ref 6–13)
AST SERPL W P-5'-P-CCNC: 22 IU/L (ref 10–42)
BASOPHILS NFR BLD AUTO: 0.1 10^3/UL (ref 0–0.1)
BASOPHILS NFR BLD AUTO: 1.1 %
BILIRUB BLD-MCNC: 0.8 MG/DL (ref 0.2–1)
BUN SERPL-MCNC: 12 MG/DL (ref 6–20)
CALCIUM UR-MCNC: 9.5 MG/DL (ref 8.5–10.3)
CHLORIDE SERPL-SCNC: 102 MMOL/L (ref 101–111)
CO2 SERPL-SCNC: 29 MMOL/L (ref 21–32)
CREAT SERPLBLD-SCNC: 1 MG/DL (ref 0.6–1.2)
EOSINOPHIL # BLD AUTO: 0.2 10^3/UL (ref 0–0.7)
EOSINOPHIL NFR BLD AUTO: 2.1 %
ERYTHROCYTE [DISTWIDTH] IN BLOOD BY AUTOMATED COUNT: 14 % (ref 12–15)
GLOBULIN SER-MCNC: 3 G/DL (ref 2.1–4.2)
GLUCOSE SERPL-MCNC: 94 MG/DL (ref 70–100)
HGB UR QL STRIP: 14.9 G/DL (ref 14–18)
LYMPHOCYTES # SPEC AUTO: 1.6 10^3/UL (ref 1.5–3.5)
LYMPHOCYTES NFR BLD AUTO: 17.4 %
MCH RBC QN AUTO: 32.1 PG (ref 27–31)
MCHC RBC AUTO-ENTMCNC: 32.4 G/DL (ref 32–36)
MCV RBC AUTO: 99.1 FL (ref 80–94)
MONOCYTES # BLD AUTO: 0.8 10^3/UL (ref 0–1)
MONOCYTES NFR BLD AUTO: 9 %
NEUTROPHILS # BLD AUTO: 6.3 10^3/UL (ref 1.5–6.6)
NEUTROPHILS # SNV AUTO: 9 X10^3/UL (ref 4.8–10.8)
NEUTROPHILS NFR BLD AUTO: 70.2 %
PDW BLD AUTO: 11.7 FL (ref 7.4–11.4)
PLATELET # BLD: 278 10^3/UL (ref 130–450)
PROT SPEC-MCNC: 7.2 G/DL (ref 6.7–8.2)
RBC MAR: 4.64 10^6/UL (ref 4.7–6.1)
SODIUM SERPLBLD-SCNC: 139 MMOL/L (ref 135–145)

## 2021-01-05 PROCEDURE — 80053 COMPREHEN METABOLIC PANEL: CPT

## 2021-01-05 PROCEDURE — 85025 COMPLETE CBC W/AUTO DIFF WBC: CPT

## 2021-01-05 PROCEDURE — 36415 COLL VENOUS BLD VENIPUNCTURE: CPT

## 2021-01-30 ENCOUNTER — HOSPITAL ENCOUNTER (OUTPATIENT)
Dept: HOSPITAL 76 - EMS | Age: 76
Discharge: ANOTHER HOSPITAL | End: 2021-01-30
Attending: SURGERY
Payer: MEDICARE

## 2021-01-30 ENCOUNTER — HOSPITAL ENCOUNTER (OUTPATIENT)
Dept: HOSPITAL 76 - ED | Age: 76
Setting detail: OBSERVATION
LOS: 2 days | Discharge: HOME HEALTH | End: 2021-02-01
Attending: INTERNAL MEDICINE | Admitting: INTERNAL MEDICINE
Payer: MEDICARE

## 2021-01-30 DIAGNOSIS — I69.354: ICD-10-CM

## 2021-01-30 DIAGNOSIS — R55: ICD-10-CM

## 2021-01-30 DIAGNOSIS — J44.9: ICD-10-CM

## 2021-01-30 DIAGNOSIS — Z79.01: ICD-10-CM

## 2021-01-30 DIAGNOSIS — I48.20: ICD-10-CM

## 2021-01-30 DIAGNOSIS — F41.9: ICD-10-CM

## 2021-01-30 DIAGNOSIS — I95.1: ICD-10-CM

## 2021-01-30 DIAGNOSIS — E86.0: ICD-10-CM

## 2021-01-30 DIAGNOSIS — F03.90: ICD-10-CM

## 2021-01-30 DIAGNOSIS — N31.8: ICD-10-CM

## 2021-01-30 DIAGNOSIS — N40.1: ICD-10-CM

## 2021-01-30 DIAGNOSIS — R53.1: Primary | ICD-10-CM

## 2021-01-30 DIAGNOSIS — R79.89: ICD-10-CM

## 2021-01-30 DIAGNOSIS — N39.0: Primary | ICD-10-CM

## 2021-01-30 DIAGNOSIS — I69.398: ICD-10-CM

## 2021-01-30 DIAGNOSIS — B96.20: ICD-10-CM

## 2021-01-30 DIAGNOSIS — R33.8: ICD-10-CM

## 2021-01-30 DIAGNOSIS — Z79.899: ICD-10-CM

## 2021-01-30 LAB
ALBUMIN DIAFP-MCNC: 3.4 G/DL (ref 3.2–5.5)
ALBUMIN/GLOB SERPL: 1.2 {RATIO} (ref 1–2.2)
ALP SERPL-CCNC: 50 IU/L (ref 42–121)
ALT SERPL W P-5'-P-CCNC: 10 IU/L (ref 10–60)
ANION GAP SERPL CALCULATED.4IONS-SCNC: 16 MMOL/L (ref 6–13)
AST SERPL W P-5'-P-CCNC: 15 IU/L (ref 10–42)
BASOPHILS NFR BLD AUTO: 0.1 10^3/UL (ref 0–0.1)
BASOPHILS NFR BLD AUTO: 0.3 %
BILIRUB BLD-MCNC: 1.3 MG/DL (ref 0.2–1)
BUN SERPL-MCNC: 15 MG/DL (ref 6–20)
C PNEUM DNA NPH QL NAA+NON-PROBE: NOT DETECTED
CALCIUM UR-MCNC: 9.2 MG/DL (ref 8.5–10.3)
CHLORIDE SERPL-SCNC: 100 MMOL/L (ref 101–111)
CLARITY UR REFRACT.AUTO: (no result)
CO2 SERPL-SCNC: 25 MMOL/L (ref 21–32)
CREAT SERPLBLD-SCNC: 1.2 MG/DL (ref 0.6–1.2)
EOSINOPHIL # BLD AUTO: 0 10^3/UL (ref 0–0.7)
EOSINOPHIL NFR BLD AUTO: 0.2 %
ERYTHROCYTE [DISTWIDTH] IN BLOOD BY AUTOMATED COUNT: 14 % (ref 12–15)
GLOBULIN SER-MCNC: 2.9 G/DL (ref 2.1–4.2)
GLUCOSE SERPL-MCNC: 99 MG/DL (ref 70–100)
GLUCOSE UR QL STRIP.AUTO: NEGATIVE MG/DL
HGB UR QL STRIP: 13.4 G/DL (ref 14–18)
KETONES UR QL STRIP.AUTO: NEGATIVE MG/DL
LIPASE SERPL-CCNC: 27 U/L (ref 22–51)
LYMPHOCYTES # SPEC AUTO: 1.2 10^3/UL (ref 1.5–3.5)
LYMPHOCYTES NFR BLD AUTO: 7.4 %
MANUAL DIF COMMENT BLD-IMP: (no result)
MCH RBC QN AUTO: 32.4 PG (ref 27–31)
MCHC RBC AUTO-ENTMCNC: 32.2 G/DL (ref 32–36)
MCV RBC AUTO: 100.7 FL (ref 80–94)
MONOCYTES # BLD AUTO: 1.8 10^3/UL (ref 0–1)
MONOCYTES NFR BLD AUTO: 11.2 %
NEUTROPHILS # BLD AUTO: 13 10^3/UL (ref 1.5–6.6)
NEUTROPHILS # SNV AUTO: 16.1 X10^3/UL (ref 4.8–10.8)
NEUTROPHILS NFR BLD AUTO: 80.7 %
NITRITE UR QL STRIP.AUTO: POSITIVE
PDW BLD AUTO: 11 FL (ref 7.4–11.4)
PH UR STRIP.AUTO: 6.5 PH (ref 5–7.5)
PLAT MORPH BLD: (no result)
PLATELET # BLD: 220 10^3/UL (ref 130–450)
PLATELET BLD QL SMEAR: (no result)
PROT SPEC-MCNC: 6.3 G/DL (ref 6.7–8.2)
PROT UR STRIP.AUTO-MCNC: (no result) MG/DL
RBC # UR STRIP.AUTO: (no result) /UL
RBC # URNS HPF: (no result) /HPF (ref 0–5)
RBC MAR: 4.13 10^6/UL (ref 4.7–6.1)
RBC MORPH BLD: (no result)
SP GR UR STRIP.AUTO: 1.01 (ref 1–1.03)
SQUAMOUS URNS QL MICRO: (no result)
UROBILINOGEN UR QL STRIP.AUTO: (no result) E.U./DL
UROBILINOGEN UR STRIP.AUTO-MCNC: NEGATIVE MG/DL
WBC CLUMPS URNS QL MICRO: PRESENT

## 2021-01-30 PROCEDURE — 96368 THER/DIAG CONCURRENT INF: CPT

## 2021-01-30 PROCEDURE — 83735 ASSAY OF MAGNESIUM: CPT

## 2021-01-30 PROCEDURE — 85025 COMPLETE CBC W/AUTO DIFF WBC: CPT

## 2021-01-30 PROCEDURE — 81003 URINALYSIS AUTO W/O SCOPE: CPT

## 2021-01-30 PROCEDURE — 96366 THER/PROPH/DIAG IV INF ADDON: CPT

## 2021-01-30 PROCEDURE — 51701 INSERT BLADDER CATHETER: CPT

## 2021-01-30 PROCEDURE — 97161 PT EVAL LOW COMPLEX 20 MIN: CPT

## 2021-01-30 PROCEDURE — 83690 ASSAY OF LIPASE: CPT

## 2021-01-30 PROCEDURE — 96365 THER/PROPH/DIAG IV INF INIT: CPT

## 2021-01-30 PROCEDURE — 99284 EMERGENCY DEPT VISIT MOD MDM: CPT

## 2021-01-30 PROCEDURE — 93005 ELECTROCARDIOGRAM TRACING: CPT

## 2021-01-30 PROCEDURE — 51702 INSERT TEMP BLADDER CATH: CPT

## 2021-01-30 PROCEDURE — 84484 ASSAY OF TROPONIN QUANT: CPT

## 2021-01-30 PROCEDURE — 0202U NFCT DS 22 TRGT SARS-COV-2: CPT

## 2021-01-30 PROCEDURE — 74176 CT ABD & PELVIS W/O CONTRAST: CPT

## 2021-01-30 PROCEDURE — 83605 ASSAY OF LACTIC ACID: CPT

## 2021-01-30 PROCEDURE — 99285 EMERGENCY DEPT VISIT HI MDM: CPT

## 2021-01-30 PROCEDURE — 87631 RESP VIRUS 3-5 TARGETS: CPT

## 2021-01-30 PROCEDURE — 36415 COLL VENOUS BLD VENIPUNCTURE: CPT

## 2021-01-30 PROCEDURE — 96375 TX/PRO/DX INJ NEW DRUG ADDON: CPT

## 2021-01-30 PROCEDURE — 87181 SC STD AGAR DILUTION PER AGT: CPT

## 2021-01-30 PROCEDURE — 81001 URINALYSIS AUTO W/SCOPE: CPT

## 2021-01-30 PROCEDURE — 80048 BASIC METABOLIC PNL TOTAL CA: CPT

## 2021-01-30 PROCEDURE — 80053 COMPREHEN METABOLIC PANEL: CPT

## 2021-01-30 PROCEDURE — 87086 URINE CULTURE/COLONY COUNT: CPT

## 2021-01-30 PROCEDURE — 71045 X-RAY EXAM CHEST 1 VIEW: CPT

## 2021-01-30 RX ADMIN — TAMSULOSIN HYDROCHLORIDE SCH MG: 0.4 CAPSULE ORAL at 22:46

## 2021-01-30 RX ADMIN — DABIGATRAN ETEXILATE MESYLATE SCH MG: 75 CAPSULE ORAL at 22:45

## 2021-01-30 RX ADMIN — SODIUM CHLORIDE AND POTASSIUM CHLORIDE SCH MLS/HR: 9; 1.49 INJECTION, SOLUTION INTRAVENOUS at 18:51

## 2021-01-30 RX ADMIN — FAMOTIDINE SCH MG: 20 TABLET, FILM COATED ORAL at 22:45

## 2021-01-30 RX ADMIN — DONEPEZIL HYDROCHLORIDE SCH MG: 5 TABLET, FILM COATED ORAL at 22:45

## 2021-01-30 NOTE — XRAY REPORT
PROCEDURE:  Chest 1 View X-Ray

 

INDICATIONS:  Chest Pain

 

TECHNIQUE:  One view of the chest was acquired.  

 

COMPARISON:  None.

 

FINDINGS:  

 

Surgical changes and devices:  None.  

 

Lungs and pleura:  No pleural effusions or pneumothorax.  Lungs are clear.  

 

Mediastinum:  Mediastinal contours appear normal.  Heart size is normal.  

 

Bones and chest wall:  No suspicious bony lesions.  Overlying soft tissues appear unremarkable.  

 

IMPRESSION:  

No acute disease.

 

Reviewed by: Marvin Bishop MD on 1/30/2021 2:47 PM PST

Approved by: Marvin Bishop MD on 1/30/2021 2:47 PM PST

 

 

Station ID:  [id number]

## 2021-01-30 NOTE — ED PHYSICIAN DOCUMENTATION
History of Present Illness





- Stated complaint


Stated Complaint: NEAR SYNCOPE





- Chief complaint


Chief Complaint: General





PD PAST MEDICAL HISTORY





- Past Medical History


Past Medical History: Yes


Cardiovascular: Hypertension, High cholesterol, Atrial fibrillation


Respiratory: None


Neuro: Dementia, CVA


Endocrine/Autoimmune: None


GI: None


: Benign prostate hypertrophy, Retention, Incontinence, Indwelling catheter


HEENT: None


Psych: Anxiety, Other


Musculoskeletal: Chronic back pain


Derm: None





- Past Surgical History


Past Surgical History: Yes


General: Splenectomy, Other


Ortho: Hip replacement, Knee replacement


HEENT: Tonsil/Adenoidectomy, Other





- Present Medications


Home Medications: 


                                Ambulatory Orders











 Medication  Instructions  Recorded  Confirmed


 


Dabigatran Etexilate Mesylate 150 mg PO BID 09/14/20 09/14/20





[Pradaxa]   


 


Acetaminophen [Tylenol] 650 mg PO Q4HR PRN  tablet 09/15/20 


 


Ciprofloxacin [Cipro] 500 mg PO BID #120 tablet 09/15/20 


 


Finasteride [Proscar] 5 mg PO DAILY  tablet 09/15/20 


 


Tamsulosin [Flomax] 0.4 mg PO DAILY #30 capsule 09/15/20 


 


Walker [Ultra-Light Rollator] 1 each MC DAILY #1 each 09/15/20 


 


Cefpodoxime Proxetil [Vantin] 200 mg PO Q12H 7 Days #14 tablet 12/27/20 














- Allergies


Allergies/Adverse Reactions: 


                                    Allergies











Allergy/AdvReac Type Severity Reaction Status Date / Time


 


No Known Drug Allergies Allergy   Verified 01/30/21 13:37














- Social History


Does the pt smoke?: No


Smoking Status: Never smoker


Does the pt drink ETOH?: Yes


Does the pt have substance abuse?: No





- Immunizations


Immunizations are current?: Yes





- POLST


Patient has POLST: No


POLST Status: Full Code





Results





- Vitals


Vitals: 





                               Vital Signs - 24 hr











  01/30/21 01/30/21





  13:31 13:37


 


Temperature 36.8 C 36.2 C L


 


Heart Rate 66 72


 


Respiratory 13 17





Rate  


 


Blood Pressure 109/61 113/48 L


 


O2 Saturation 98 99








                                     Oxygen











O2 Source [With Activity]      Room air


 


O2 Source                      Room air

















- EKG (time done)


  ** 1347


Rhythm: Atrial fibrillation


Axis: Other


QRS: Normal


Compare to prior EKG: Unchanged from prior EKG


Computer interpretation: Disagree with computer (no ST elevation anterior leads)

## 2021-01-30 NOTE — ED PHYSICIAN DOCUMENTATION
History of Present Illness





- Stated complaint


Stated Complaint: NEAR SYNCOPE





- Chief complaint


Chief Complaint: General





- Additonal information


Additional information: 


75-year-old male who has a history of atrial fibrillation (anticoagulated on 

pradaxa), prostate issues, previous CVA with left-sided deficits presents the 

emergency department with a near syncopal event.  He was at home and his wife 

was helping him to the bathroom when he began to feel as though he would pass 

out or faint.  He did not lose consciousness.  He reports that the symptom of 

feeling that he wanted to pass out improved after he had a bowel movement.





EMS was on scene and they noted a blood sugar of 116.  He however was 

orthostatic for them when they stood him up his blood pressures dropped to the 

80s over 50s.  They initiated IV fluid and gave him approximately 500 mils in 

route to the emergency department.





At this time patient denies chest pain, shortness of breath, abdominal pain.





He does have a history of significant prostate enlargement and self 

catheterizes.  He did recently complete a course of antibiotics for urinary 

tract infection.





meds: Flomax, finasteride, donezepil, Seroquel








Review of Systems


Constitutional: reports: Reviewed and negative


Ears: reports: Reviewed and negative


Nose: reports: Reviewed and negative


Throat: reports: Reviewed and negative


Cardiac: reports: Reviewed and negative


Respiratory: reports: Reviewed and negative


GI: reports: Reviewed and negative


: reports: Unable to Void (self cath at baseline, hx of prostate concerns)


Skin: reports: Reviewed and negative


Musculoskeletal: reports: Reviewed and negative


Neurologic: reports: Focal weakness (mild left sided deficits), Near syncope.  

denies: Syncope, Seizure, Headache, Head injury, LOC





PD PAST MEDICAL HISTORY





- Past Medical History


Cardiovascular: Hypertension, High cholesterol, Atrial fibrillation


Respiratory: None


Neuro: Dementia, CVA


Endocrine/Autoimmune: None


GI: None


: Benign prostate hypertrophy, Retention, Incontinence, Indwelling catheter


HEENT: None


Psych: Anxiety, Other


Musculoskeletal: Chronic back pain


Derm: None





- Past Surgical History


Past Surgical History: Yes


General: Splenectomy, Other


Ortho: Hip replacement, Knee replacement


HEENT: Tonsil/Adenoidectomy, Other





- Present Medications


Home Medications: 


                                Ambulatory Orders











 Medication  Instructions  Recorded  Confirmed


 


Finasteride [Proscar] 5 mg PO DAILY  tablet 09/15/20 01/30/21


 


Tamsulosin [Flomax] 0.4 mg PO DAILY #30 capsule 09/15/20 01/30/21


 


Walker [Ultra-Light Rollator] 1 each MC DAILY #1 each 09/15/20 01/30/21


 


Donepezil [Aricept] 5 mg PO DAILY PM 01/30/21 01/30/21


 


QUEtiapine [SEROquel] 25 mg PO DAILY PM 01/30/21 01/30/21














- Allergies


Allergies/Adverse Reactions: 


                                    Allergies











Allergy/AdvReac Type Severity Reaction Status Date / Time


 


No Known Drug Allergies Allergy   Verified 01/30/21 13:37














- Social History


Does the pt smoke?: No


Smoking Status: Never smoker


Does the pt drink ETOH?: Yes


Does the pt have substance abuse?: No





- Immunizations


Immunizations are current?: Yes





- POLST


Patient has POLST: No


POLST Status: Full Code





PD ED PE EXPANDED





- General


General: Alert, No acute distress, Well developed/nourished





- HEENT


HEENT: Atraumatic, PERRL, Moist mucous membranes, Pharynx normal





- Eyes


Eyes: PERRL, Normal accommodation





- Neck


Neck: Adenopathy, No tenderness





- Cardiac


Cardiac: Irregularly irregular, Radial strong equal, Cap refill < 2 sec, 

Prolonged cap refill.  No: Murmur Present





- Respiratory


Respiratory: Clear to ausultation chepe.  No: Distress, Labored





- Abdomen


Abdomen: Normal Bowel sounds.  No: Tender to palpation





- Derm


Derm: Normal color, Warm and dry.  No: Rash





- Extremities


Extremities: Normal.  No: Deformity, Tenderness





- Neuro


Neuro: Confused (dementia), CNII-XII intact (mild left arm weakness (baseline)),

 Normal speech





- GCS


Eye Opening: Spontaneous


Motor: Obeys Commands


Verbal: Oriented


Total: 15





Results





- Vitals


Vitals: 


                               Vital Signs - 24 hr











  01/30/21 01/30/21 01/30/21





  13:31 13:37 14:23


 


Temperature 36.8 C 36.2 C L 


 


Heart Rate 66 72 


 


Heart Rate [   72





Sitting]   


 


Heart Rate [   76





Standing]   


 


Heart Rate [   71





Supine]   


 


Respiratory 13 17 





Rate   


 


Blood Pressure 109/61 113/48 L 


 


Blood Pressure   108/68





[Sitting]   


 


Blood Pressure   98/68





[Standing]   


 


Blood Pressure   121/63





[Supine]   


 


O2 Saturation 98 99 














  01/30/21





  15:10


 


Temperature 


 


Heart Rate 76


 


Heart Rate [ 





Sitting] 


 


Heart Rate [ 





Standing] 


 


Heart Rate [ 





Supine] 


 


Respiratory 16





Rate 


 


Blood Pressure 132/85 H


 


Blood Pressure 





[Sitting] 


 


Blood Pressure 





[Standing] 


 


Blood Pressure 





[Supine] 


 


O2 Saturation 100








                                     Oxygen











O2 Source [With Activity]      Room air


 


O2 Source                      Room air

















- EKG (time done)


  ** 1347


Rate: Rate (enter#) (70)


Rhythm: Atrial fibrillation


Axis: Normal


Intervals: Normal PA


Ischemia: Non specific changes


Compare to prior EKG: Unchanged from prior EKG


Computer interpretation: Disagree with computer (no VINH)





- Labs


Labs: 


                                Laboratory Tests











  01/30/21 01/30/21 01/30/21





  14:11 14:11 14:11


 


WBC  16.1 H  


 


RBC  4.13 L  


 


Hgb  13.4 L  


 


Hct  41.6 L  


 


MCV  100.7 H  


 


MCH  32.4 H  


 


MCHC  32.2  


 


RDW  14.0  


 


Plt Count  220  


 


MPV  11.0  


 


Manual Slide Review  Indicated  


 


Sodium   141 


 


Potassium   4.1 


 


Chloride   100 L 


 


Carbon Dioxide   25 


 


Anion Gap   16.0 H 


 


BUN   15 


 


Creatinine   1.2 


 


Estimated GFR (MDRD)   59 L 


 


Glucose   99 


 


Lactic Acid   


 


Calcium   9.2 


 


Total Bilirubin   1.3 H 


 


AST   15 


 


ALT   10 


 


Alkaline Phosphatase   50 


 


Troponin I High Sens    12.6


 


Total Protein   6.3 L 


 


Albumin   3.4 


 


Globulin   2.9 


 


Albumin/Globulin Ratio   1.2 


 


Lipase   27 


 


Urine Color   


 


Urine Clarity   


 


Urine pH   


 


Ur Specific Gravity   


 


Urine Protein   


 


Urine Glucose (UA)   


 


Urine Ketones   


 


Urine Occult Blood   


 


Urine Nitrite   


 


Urine Bilirubin   


 


Urine Urobilinogen   


 


Ur Leukocyte Esterase   


 


Urine RBC   


 


Urine WBC   


 


Urine WBC Clumps   


 


Ur Squamous Epith Cells   


 


Urine Bacteria   


 


Ur Microscopic Review   


 


Urine Culture Comments   














  01/30/21 01/30/21





  14:32 14:48


 


WBC  


 


RBC  


 


Hgb  


 


Hct  


 


MCV  


 


MCH  


 


MCHC  


 


RDW  


 


Plt Count  


 


MPV  


 


Manual Slide Review  


 


Sodium  


 


Potassium  


 


Chloride  


 


Carbon Dioxide  


 


Anion Gap  


 


BUN  


 


Creatinine  


 


Estimated GFR (MDRD)  


 


Glucose  


 


Lactic Acid   1.8


 


Calcium  


 


Total Bilirubin  


 


AST  


 


ALT  


 


Alkaline Phosphatase  


 


Troponin I High Sens  


 


Total Protein  


 


Albumin  


 


Globulin  


 


Albumin/Globulin Ratio  


 


Lipase  


 


Urine Color  YELLOW 


 


Urine Clarity  SL. CLOUDY 


 


Urine pH  6.5 


 


Ur Specific Gravity  1.010 


 


Urine Protein  TRACE 


 


Urine Glucose (UA)  NEGATIVE 


 


Urine Ketones  NEGATIVE 


 


Urine Occult Blood  SMALL H 


 


Urine Nitrite  POSITIVE H 


 


Urine Bilirubin  NEGATIVE 


 


Urine Urobilinogen  0.2 (NORMAL) 


 


Ur Leukocyte Esterase  LARGE H 


 


Urine RBC  0-5 


 


Urine WBC  >25 H 


 


Urine WBC Clumps  PRESENT 


 


Ur Squamous Epith Cells  RARE Squamous 


 


Urine Bacteria  Many H 


 


Ur Microscopic Review  INDICATED 


 


Urine Culture Comments  INDICATED 














- Rads (name of study)


  ** CXR


Radiology: Final report received (no acute findings)





PD MEDICAL DECISION MAKING





- ED course


Complexity details: reviewed results, re-evaluated patient, considered 

differential, d/w patient, d/w family


ED course: 


75-year-old male brought into the ER via EMS for a near syncopal event at home 

when he was ambulating with his wife to the bathroom.  He does have a pre-

existing history of atrial fib rate controlled with finasteride and 

anticoagulated on Pradaxa.  He also has a history of chronic urinary retention 

secondary to prostate issues for which he self caths.  He did complete a course 

of antibiotics last week for a recurrent urinary tract infection.





Here in the emergency department he was noted to have an orthostatic blood 

pressure change with a decrease of almost 20 points systolic from laying to 

standing.  This was after 1 L of IV fluid.  Screening labs reveal moderate 

leukocytosis of 16,000.  Lactic acid is not elevated.  He does not have a fever 

here.  However unfortunately his urine does show persistence of infection.  1 g 

of ceftriaxone was ordered.  I discussed this case with our admitting 

hospitalist Dr. CONNOLLY she agrees to bring the patient in on an observation status 

for further management of his presyncopal episodes and evaluation of his urinary

tract infection.  He does not meet the criteria for sepsis.





I did speak on the phone with his wife and she understands that we will be 

bringing him into the hospital overnight.








Departure





- Departure


Disposition: ED Place in Observation


Clinical Impression: 


 Near syncope, Cystitis

## 2021-01-30 NOTE — HISTORY & PHYSICAL EXAMINATION
DATE OF SERVICE: 01/30/2021

Physician: Pily Jarquin MD

 

HISTORY OF PRESENT ILLNESS:  This is a 75-year-old white male with a history of 
stroke and dementia  following that stroke, also has neurogenic bladder because 
of the stroke and BPH.  He required a chronic indwelling Pineda during 2020, 
prior to that he was self-cathing and because of his dementia, he would get up 
45 minutes to do this.  He has been admitted here approximately 6 months ago and
a year ago with UTI growing Escherichia coli and also had gram-negative 
bacteremia with that and a blocked Pineda catheter.  He also has BPH, chronic 
atrial fibrillation and is on Pradaxa.  The patient was walking to the bathroom 
with the assistance of his wife today and had near syncope, but did not entirely
lose consciousness.  He sat on the toilet and then felt better.  Because of 
this, the wife called the ambulance.  On the scene, he was documented to have a 
standing blood pressure of 80s/50s.  He started to get fluids en route by 
ambulance.  In the ER, he still was orthostatic after getting a liter of fluid 
with a supine blood pressure of 121/63 and then upon standing, it was 98/68.  
His heart rate is in the 70s with this and did not have a compensatory 
tachycardia.  His other workup in the ER showed an abnormal urinalysis with 
evidence of bacteriuria and he is being placed in observation for managing near 
syncope, orthostasis, possible recurrent UTI.

 

PAST MEDICAL HISTORY  

1.  Stroke. 

2.  Dementia. 

3.  Neurogenic bladder. 

4.  BPH. 

5.  Chronic atrial fibrillation. 

6.  Prior chronic indwelling Pineda, but this is no longer present and he does 
self-cath at home. 

7.  History of COPD. 

8.  History of prior spermatocele. 

9.  Recurrent UTIs and prior gram-negative bacteremia.

 

ALLERGIES:  NONE.

 

MEDICATIONS  

1.  Pradaxa 150 mg b.i.d.

2.  Tamsulosin 0.4 mg b.i.d.

3.  Finasteride 5 mg every day.

4.  Donepezil 5 mg every night.

5.  Quetiapine 25 mg t.i.d.

 

REVIEW OF SYSTEMS:  The patient states he is still feeling "weak." He cannot 
give me any other details about any other parts of his history.  I tried to call
his wife and there is nobody picking up the phone to give me more details.  A 
comprehensive review of systems was done from chart review and the pertinent 
positives are listed, the rest are negative.

 

FAMILY HISTORY:  No inherited diseases.

 

SOCIAL HISTORY:  He lives with his wife.  He is a Nonsmoker.  No alcohol abuse 
history.

 

PHYSICAL EXAM  

GENERAL:  Elderly white male.  He appears fatigued, but is in no distress.  He 
is supine in bed.

VITAL SIGNS:  Blood pressure 127/81, pulse 84 in atrial fibrillation, afebrile, 
and room air saturation is 100%.

HEENT:  Reveals dry oral mucosa and his eyes looked very tired and he appears 
sleepy.

NECK:  No JVD in a supine position.

CHEST:  Clear anteriorly.

HEART:  Sounds are irregular.  No audible murmur.

ABDOMEN:  Soft, nontender.  No organomegaly.

EXTREMITIES:  No clubbing, cyanosis or edema.

NEUROLOGIC:  Poor memory, nonfocal.

 

LABORATORY DATA:  Sodium 141, potassium 4.1, BUN 15, creatinine 1.2.  His usual 
creatinine is 0.7.  Lactic acid is 1.8.  Troponin is normal at 12.6. bilirubin 
1.3.  Normal liver tests.  Normal lipase.  White blood count 16.1, hemoglobin 
13.4, MCV is 100.7, platelet count normal at 220.  No INR was done and it would 
be inaccurate in a patient on Pradaxa.  [employer] swab was negative for COVID.  
His urinalysis showed a pH of 6.5, specific gravity 1.01, trace protein, small 
occult blood, positive nitrite, large leukocyte esterase, greater than 25 white 
cells and many bacteria.

 

CHEST X-RAY:  No active pulmonary disease.

 

EKG:  Atrial fibrillation, low voltage in the limb leads, there is no 
significant change from prior EKG.

 

IMPRESSION/DIAGNOSES

1.  Near syncope.

2.  Orthostatic hypotension.

3.  Prerenal azotemia.

4.  Probable urinary tract infection with presence of both nitrites, leukocyte 
esterase and many bacteria in a patient who has to self-cath.

5.  Neurogenic bladder.

6.  Benign prostatic hypertrophy.

7.  Dementia.

8.  Old stroke.

9.  Chronic atrial fibrillation and this patient is not on any heart rate 
slowing medications with no orthostatic rise in heart rate to compensate, 
suggesting that he has sick sinus syndrome.

 

PLAN

1.  Admit the patient to Observation status on the Hospitalist service on 
telemetry.

2.  Start IV fluids and follow I's and O's.

3.  Recheck orthostatic vital signs every day.  If he continues to have 
orthostasis and evidence of prerenal azotemia on labs tomorrow, he will be 
admitted to full inpatient status for further management of his intravascular 
volume.

4.  Start treatment for UTI using IV ceftriaxone.  Review if he is sensitive to 
ceftriaxone on old urine cultures.

5.  Obtain CT of the abdomen and pelvis without contrast for evaluation of 
extent of UTI, such as possible pyelonephritis.  If this is present, then he 
will also have indication to be admitted to full inpatient status.

6.  Continue with his medications for dementia and anxiety and agitation.  

7.  Continue his medications for stroke prophylaxis using his Pradaxa.  

Continue medications for his BPH, resuming his tamsulosin and finasteride.

 

CODE STATUS:  FULL CODE (there is no POLST on file in this EMR and he does not 
understand due to dementia).

 

DEEP VENOUS THROMBOSIS PROPHYLAXIS:  LYNETTE stockings for compression.

 

ATTESTATION:  Patient is expected to be discharged or transferred to another 
facility within 96 hours:  Yes.

 

 

cc: Oleksandr Zamorano MD

DD: 01/30/2021 18:10

TD: 01/30/2021 18:11

Job #: [id number]

MTDD

## 2021-01-30 NOTE — CT REPORT
PROCEDURE:  Abdomen/Pelvis WO

 

INDICATIONS:  UTI, self caths for BPH

 

TECHNIQUE:  

Noncontrast 5 mm thick sections acquired from the diaphragms to the symphysis.  5 mm coronal and sagi
ttal reformats were then performed.  For radiation dose reduction, the following was used:  automated
 exposure control, adjustment of mA and/or kV according to patient size.

 

COMPARISON:  None.

 

FINDINGS:  

Image quality:  Excellent.  

 

ABDOMEN:  

Lung bases:  Lung bases are clear.  Heart size is borderline enlarged. Mild coronary artery calcifica
tions. Moderate hiatal hernia.

 

Solid organs:  Liver and spleen are normal in size.  Gallbladder negative. Pancreas is normal in cont
ours.  No adrenal nodules.  Kidneys are normal in size, without hydronephrosis or nephrolithiasis. Mi
ld prominence of the left ureter although the exact etiology and age is unknown. Subcentimeter renal 
foci which are statistically cysts, however too to characterize accurately and therefore technically 
indeterminate.

 

 

 Nonspecific presumed right renal exophytic cyst. This finding technically too small to characterize.


 

Peritoneum and bowel:  Unenhanced bowel loops demonstrate normal wall thickness and caliber.  No free
 fluid or air.  Colonic diverticulosis incidentally noted without evidence of acute inflammation.

 

 

Nodes and vessels:  No retroperitoneal or mesenteric adenopathy by size criteria.  Aorta and inferior
 vena cava are normal in caliber.  Scattered vascular calcifications are present in the aorta.  

 

Miscellaneous:  No ventral hernias.  

 

 

PELVIS:  

Genitourinary: Large right posterior bladder diverticulum. The bladder is distended.

 

Miscellaneous:  No inguinal hernias or adenopathy.  

 

Bones:  No suspicious bony lesions.  No vertebral body compression fractures.  

 

IMPRESSION:  

No urolithiasis. No definite urinary obstruction.

 

Moderate hiatal hernia

 

Additional chronic and incidental findings as above.

 

Distended bladder.

 

 

Reviewed by: Marvin Chaves MD on 1/30/2021 6:56 PM PST

Approved by: Mravin Chaves MD on 1/30/2021 6:56 PM PST

 

 

Station ID:  [id number]

## 2021-01-31 LAB
ANION GAP SERPL CALCULATED.4IONS-SCNC: 9 MMOL/L (ref 6–13)
BASOPHILS # BLD MANUAL: 0 10^3/UL (ref 0–0.1)
BASOPHILS NFR BLD AUTO: 0.6 %
BUN SERPL-MCNC: 11 MG/DL (ref 6–20)
CALCIUM UR-MCNC: 8.6 MG/DL (ref 8.5–10.3)
CHLORIDE SERPL-SCNC: 105 MMOL/L (ref 101–111)
CO2 SERPL-SCNC: 24 MMOL/L (ref 21–32)
CREAT SERPLBLD-SCNC: 0.9 MG/DL (ref 0.6–1.2)
EOSINOPHIL # BLD MANUAL: 0.2 10^3/UL (ref 0–0.7)
EOSINOPHIL NFR BLD AUTO: 2.2 %
ERYTHROCYTE [DISTWIDTH] IN BLOOD BY AUTOMATED COUNT: 14.1 % (ref 12–15)
GLUCOSE SERPL-MCNC: 104 MG/DL (ref 70–100)
HGB UR QL STRIP: 13.3 G/DL (ref 14–18)
LYMPH ABN NFR BLD MANUAL: 0 %
LYMPHOBLASTS # BLD: 22 %
LYMPHOCYTES # BLD MANUAL: 2.6 10^3/UL (ref 1.5–3.5)
LYMPHOCYTES NFR BLD AUTO: 20 %
MAGNESIUM SERPL-MCNC: 2.4 MG/DL (ref 1.7–2.8)
MANUAL DIF COMMENT BLD-IMP: (no result)
MCH RBC QN AUTO: 31.7 PG (ref 27–31)
MCHC RBC AUTO-ENTMCNC: 32 G/DL (ref 32–36)
MCV RBC AUTO: 99.3 FL (ref 80–94)
MONOCYTES # BLD MANUAL: 1.6 10^3/UL (ref 0–1)
MONOCYTES NFR BLD AUTO: 14.5 %
NEUTROPHILS # SNV AUTO: 12 X10^3/UL (ref 4.8–10.8)
NEUTROPHILS NFR BLD AUTO: 62.2 %
NEUTS BAND NFR BLD: 0 %
PDW BLD AUTO: 11.1 FL (ref 7.4–11.4)
PLAT MORPH BLD: (no result)
PLATELET # BLD: 234 10^3/UL (ref 130–450)
PLATELET BLD QL SMEAR: (no result)
RBC MAR: 4.19 10^6/UL (ref 4.7–6.1)
RBC MORPH BLD: (no result)

## 2021-01-31 RX ADMIN — DABIGATRAN ETEXILATE MESYLATE SCH MG: 75 CAPSULE ORAL at 20:37

## 2021-01-31 RX ADMIN — SODIUM CHLORIDE, PRESERVATIVE FREE SCH: 5 INJECTION INTRAVENOUS at 03:38

## 2021-01-31 RX ADMIN — TAMSULOSIN HYDROCHLORIDE SCH MG: 0.4 CAPSULE ORAL at 08:38

## 2021-01-31 RX ADMIN — FAMOTIDINE SCH MG: 20 TABLET, FILM COATED ORAL at 20:37

## 2021-01-31 RX ADMIN — SODIUM CHLORIDE AND POTASSIUM CHLORIDE SCH MLS/HR: 9; 1.49 INJECTION, SOLUTION INTRAVENOUS at 05:00

## 2021-01-31 RX ADMIN — SODIUM CHLORIDE, PRESERVATIVE FREE SCH: 5 INJECTION INTRAVENOUS at 08:38

## 2021-01-31 RX ADMIN — FAMOTIDINE SCH MG: 20 TABLET, FILM COATED ORAL at 08:38

## 2021-01-31 RX ADMIN — SODIUM CHLORIDE, PRESERVATIVE FREE SCH: 5 INJECTION INTRAVENOUS at 16:40

## 2021-01-31 RX ADMIN — SODIUM CHLORIDE AND POTASSIUM CHLORIDE SCH MLS/HR: 9; 1.49 INJECTION, SOLUTION INTRAVENOUS at 14:52

## 2021-01-31 RX ADMIN — DONEPEZIL HYDROCHLORIDE SCH MG: 5 TABLET, FILM COATED ORAL at 20:37

## 2021-01-31 RX ADMIN — FINASTERIDE SCH MG: 5 TABLET, FILM COATED ORAL at 08:39

## 2021-01-31 RX ADMIN — DEXTROSE MONOHYDRATE SCH MLS/HR: 50 INJECTION, SOLUTION INTRAVENOUS at 08:43

## 2021-01-31 RX ADMIN — DABIGATRAN ETEXILATE MESYLATE SCH MG: 75 CAPSULE ORAL at 08:38

## 2021-01-31 RX ADMIN — TAMSULOSIN HYDROCHLORIDE SCH MG: 0.4 CAPSULE ORAL at 20:37

## 2021-01-31 NOTE — PROVIDER PROGRESS NOTE
Assessment/Plan





- Problem List


(1) Orthostatic hypotension


Assessment/Plan: 


Patient has improved in his orthostasis and is no longer hypotensive with 

standing.  His morning labs show resolution of the prerenal azotemia.


Continue with IV fluids.


Continue to treat the underlying infection.








(2) Near syncope


Assessment/Plan: 


Etiology appears to be dehydration, probably from his acute infection.


Continue to check orthostatic vital signs.


We will order PT evaluation since this near syncope happened while he was 

walking.








(3) E. coli UTI


Assessment/Plan: 


Urinalysis was very abnormal at admission yesterday.


CT of abdomen and pelvis did not show nephrolithiasis or hydronephrosis.  He 

does have an enlarged urinary bladder which was expected because of his history 

(see below).


White blood count was elevated at 17, and is improved to 12 after starting IV 

antibiotics


The urine culture is already growing E. coli.


Await sensitivities.


The last infection was with E. coli and it was 1 month ago.  That bacteria was 

sensitive to cephalosporins.


Continue with empiric IV ceftrixone.


Follow CBC daily.








(4) Urinary retention


Assessment/Plan: 


This is due to a neurogenic bladder after having a stroke, and due to BPH.


Since being here, bladder scans are showing 250-1500 cc of fluid in the bladder.


We will order straight cath 3 times daily, this is what he does at home, so as 

to not require inserting a Pineda catheter, which was recently advised to be 

removed by his Urologist.








(5) Dementia


Qualifiers: 


   Dementia behavioral disturbance: without behavioral disturbance 


Assessment/Plan: 


Continue his home donepezil dose and is quiet appearing, with this he has no 

behavioral disturbances and is cooperative.


The patient's wife told me yesterday by phone, there is a POLST form in the 

house but she could not remember the details, however she herself requested the 

patient be a FULL CODE yesterday.


I have requested that Social Work obtain a copy of that POLST form, which is a 

legal document, and we will follow that documented CODE STATUS, unless the wife 

wishes to legally change that request with a new POLST form.








(6) Chronic atrial fibrillation


Assessment/Plan: 


His heart rate is in a good range, and this is without any heart rate slowing 

meds, indicating that he has sick sinus syndrome or intrinsic conduction system 

disease.


He is on his home dose of Pradaxa for stroke prophylaxis.








- Current Meds


Current Meds: 





                               Current Medications











Generic Name Dose Route Start Last Admin





  Trade Name Freq  PRN Reason Stop Dose Admin


 


Dabigatran  150 mg  01/30/21 21:00  01/31/21 08:38





  Dabigatran 75 Mg Capsule  PO   150 mg





  BID SEMAJ   Administration


 


Donepezil HCl  5 mg  01/30/21 21:00  01/30/21 22:45





  Donepezil 5 Mg Tablet  PO   5 mg





  QPM SEMAJ   Administration


 


Famotidine  20 mg  01/30/21 21:00  01/31/21 08:38





  Famotidine 20 Mg Tablet  PO   20 mg





  BID SEMAJ   Administration


 


Finasteride  5 mg  01/31/21 09:00  01/31/21 08:39





  Finasteride 5 Mg Tablet  PO   5 mg





  DAILY SEMAJ   Administration


 


Potassium Chloride/Sodium Chloride  1,000 mls @ 100 mls/hr  01/30/21 18:00  

01/31/21 09:15





  Normal Saline 0.9% W/20 Meq Kcl  IV   100 mls/hr





  .Q10H SEMAJ   Infusion


 


Ceftriaxone Sodium 1 gm/  100 mls @ 200 mls/hr  01/31/21 09:00  01/31/21 09:15





  Sodium Chloride  IV   Infused





  DAILY SEMAJ   Infusion


 


Quetiapine Fumarate  75 mg  01/30/21 21:00  01/30/21 22:45





  Quetiapine 25 Mg Tablet  PO   75 mg





  QPM SEMAJ   Administration


 


Sodium Chloride  10 ml  01/31/21 01:00  01/31/21 08:38





  Sodium Chloride Flush 0.9% 10 Ml Syringe  IVP   Not Given





  0100,0900,1700 SEMAJ  


 


Tamsulosin HCl  0.4 mg  01/30/21 21:00  01/31/21 08:38





  Tamsulosin 0.4 Mg Capsule  PO   0.4 mg





  BID SEMAJ   Administration














- Lab Result


Fish Bone Diagrams: 


                                 01/31/21 04:14





                                 01/31/21 04:14





- Additional Planning


My Orders: 





My Active Orders





01/30/21 Dinner


Soft Mechanical Diet [DIET] 





01/30/21 17:27


Activity Orders [RC] Q2HR 


IO [RC] IOSHIFT 


Initiate Bowel Care Protocol [RC] .protocol 


Initiate Line Care Protocol [RC] QSHIFT 


Initiate Personal Care Protoco [RC] .protocol 


Telemetry (24 Hour) [RC] Q4HR 


Vital Signs [RC] Q4HR 


Acetaminophen [Tylenol]   650 mg PO Q4HR PRN 


Sodium Chloride Flush 0.9% [Normal Saline Flush 0.9%]   10 ml IVP PRN PRN 


Code Status [OTHERS] Routine 


Condition of Patient [OTHERS] Routine 


DVT Prophylaxis [OTHERS] Routine 





01/30/21 17:29


IV Insert [RC] .ONCE 


LYNETTE Hose [RC] QSHIFT 





01/30/21 18:00


Ns W/20 Meq KCl [Normal Saline 0.9% W/20 Meq KCl] 1,000 ml  mls/hr 





01/30/21 21:00


Dabigatran [Pradaxa]   150 mg PO BID 


Donepezil [Aricept]   5 mg PO QPM 


Famotidine [Pepcid]   20 mg PO BID 


QUEtiapine [SEROquel]   75 mg PO QPM 


Tamsulosin [Flomax]   0.4 mg PO BID 





01/31/21


Evaluate and Treat PT [PT] Routine 





01/31/21 01:00


Sodium Chloride Flush 0.9% [Normal Saline Flush 0.9%]   10 ml IVP 0100,0900,1700







01/31/21 08:00


Orthostatic [Vital Signs - Orthostatic] [RC] DAILY 





01/31/21 09:00


Finasteride [Proscar]   5 mg PO DAILY 





01/31/21 10:42


Straight Catheter Insertion [RC] TID 














Subjective





- Subjective


Patient Reports: Resting Comfortably





Objective


Vital Signs: 





                               Vital Signs - 24 hr











  01/30/21 01/30/21 01/30/21





  13:31 13:37 14:23


 


Temperature 36.8 C 36.2 C L 


 


Heart Rate 66 72 


 


Heart Rate [   





Brachial]   


 


Heart Rate [   





Monitoring   





electrodes]   


 


Heart Rate [   72





Sitting]   


 


Heart Rate [   76





Standing]   


 


Heart Rate [   71





Supine]   


 


Respiratory 13 17 





Rate   


 


Blood Pressure 109/61 113/48 L 


 


Blood Pressure   





[Right Brachial   





artery]   


 


Blood Pressure   





[Right Femoral   





artery]   


 


Blood Pressure   108/68





[Sitting]   


 


Blood Pressure   98/68





[Standing]   


 


Blood Pressure   121/63





[Supine]   


 


O2 Saturation 98 99 














  01/30/21 01/30/21 01/30/21





  15:10 16:22 17:37


 


Temperature  37.0 C 


 


Heart Rate 76 74 84


 


Heart Rate [   





Brachial]   


 


Heart Rate [   





Monitoring   





electrodes]   


 


Heart Rate [   





Sitting]   


 


Heart Rate [   





Standing]   


 


Heart Rate [   





Supine]   


 


Respiratory 16 20 19





Rate   


 


Blood Pressure 132/85 H 112/80 127/81 H


 


Blood Pressure   





[Right Brachial   





artery]   


 


Blood Pressure   





[Right Femoral   





artery]   


 


Blood Pressure   





[Sitting]   


 


Blood Pressure   





[Standing]   


 


Blood Pressure   





[Supine]   


 


O2 Saturation 100 99 100














  01/30/21 01/30/21 01/31/21





  18:06 21:00 00:27


 


Temperature 36.7 C 36.7 C 36.6 C


 


Heart Rate   


 


Heart Rate [  75 77





Brachial]   


 


Heart Rate [ 75  





Monitoring   





electrodes]   


 


Heart Rate [   





Sitting]   


 


Heart Rate [   





Standing]   


 


Heart Rate [   





Supine]   


 


Respiratory 18 18 16





Rate   


 


Blood Pressure   


 


Blood Pressure  136/73 H 133/57 H





[Right Brachial   





artery]   


 


Blood Pressure 128/69  





[Right Femoral   





artery]   


 


Blood Pressure   





[Sitting]   


 


Blood Pressure   





[Standing]   


 


Blood Pressure   





[Supine]   


 


O2 Saturation 96 96 94














  01/31/21 01/31/21 01/31/21





  05:00 08:31 11:34


 


Temperature 36.6 C 36.7 C 36.6 C


 


Heart Rate   


 


Heart Rate [ 73 83 71





Brachial]   


 


Heart Rate [   





Monitoring   





electrodes]   


 


Heart Rate [   





Sitting]   


 


Heart Rate [   





Standing]   


 


Heart Rate [   





Supine]   


 


Respiratory 15 16 18





Rate   


 


Blood Pressure   


 


Blood Pressure 136/85 H 136/82 H 134/77 H





[Right Brachial   





artery]   


 


Blood Pressure   





[Right Femoral   





artery]   


 


Blood Pressure   





[Sitting]   


 


Blood Pressure   





[Standing]   


 


Blood Pressure   





[Supine]   


 


O2 Saturation 97 98 98








                                     Oxygen











O2 Source [With Activity]      Room air


 


O2 Source                      Room air














I&O (Last 24 Hrs): 





                          Intake and Output Totals x24h











 01/29/21 01/30/21 01/31/21





 23:59 23:59 23:59


 


Intake Total  1000 3188.333


 


Output Total   1820


 


Balance  1000 1368.333











General: Alert


HEENT: Mucous membr. moist/pink


Neck: Supple, No JVD


Neuro: Alert, Disoriented, Non Focal


Cardiovascular: No murmurs, Other (Irreg irreg)


Respiratory: No respiratory distress


Abdomen: Normal bowel sounds


Extremities: No edema





- Results


Results: 





                               Laboratory Results











WBC  12.0 x10^3/uL (4.8-10.8)  H  01/31/21  04:14    


 


RBC  4.19 10^6/uL (4.70-6.10)  L  01/31/21  04:14    


 


Hgb  13.3 g/dL (14.0-18.0)  L  01/31/21  04:14    


 


Hct  41.6 % (42.0-52.0)  L  01/31/21  04:14    


 


MCV  99.3 fL (80.0-94.0)  H  01/31/21  04:14    


 


MCH  31.7 pg (27.0-31.0)  H  01/31/21  04:14    


 


MCHC  32.0 g/dL (32.0-36.0)   01/31/21  04:14    


 


RDW  14.1 % (12.0-15.0)   01/31/21  04:14    


 


Plt Count  234 10^3/uL (130-450)   01/31/21  04:14    


 


MPV  11.1 fL (7.4-11.4)   01/31/21  04:14    


 


Neut # (Auto)  Not Reportable   01/31/21  04:14    


 


Lymph # (Auto)  Not Reportable   01/31/21  04:14    


 


Mono # (Auto)  Not Reportable   01/31/21  04:14    


 


Eos # (Auto)  Not Reportable   01/31/21  04:14    


 


Baso # (Auto)  Not Reportable   01/31/21  04:14    


 


Absolute Nucleated RBC  Not Reportable   01/31/21  04:14    


 


Total Counted  100   01/31/21  04:14    


 


Band Neuts % (Manual)  0 % (0-10)  01/31/21  04:14    


 


Abnorm Lymph % (Manual)  0 %  01/31/21  04:14    


 


Nucleated RBC %  Not Reportable   01/31/21  04:14    


 


Neutrophils # (Manual)  7.6 10^3/uL (1.5-6.6)  H  01/31/21  04:14    


 


Lymphocytes # (Manual)  2.6 10^3/uL (1.5-3.5)   01/31/21  04:14    


 


Monocytes # (Manual)  1.6 10^3/uL (0.0-1.0)  H  01/31/21  04:14    


 


Eosinophils # (Manual)  0.2 10^3/uL (0-0.7)   01/31/21  04:14    


 


Basophils # (Manual)  0.0 10^3/uL (0-0.1)   01/31/21  04:14    


 


Differential Comment  MANUAL DIFFERENTIAL   01/31/21  04:14    


 


Manual Slide Review  Indicated   01/30/21  14:11    


 


WBC Morphology  NORMAL APPEARANCE  (NORMAL)   01/31/21  04:14    


 


Platelet Estimate  NORMAL (130-450,000)  (NORMAL)   01/31/21  04:14    


 


Platelet Morphology  NORMAL APPEARANCE  (NORMAL)   01/31/21  04:14    


 


RBC Morph Micro Appear  NORMAL APPEARANCE  (NORMAL)   01/31/21  04:14    


 


Sodium  138 mmol/L (135-145)   01/31/21  04:14    


 


Potassium  3.9 mmol/L (3.5-5.0)   01/31/21  04:14    


 


Chloride  105 mmol/L (101-111)   01/31/21  04:14    


 


Carbon Dioxide  24 mmol/L (21-32)   01/31/21  04:14    


 


Anion Gap  9.0  (6-13)   01/31/21  04:14    


 


BUN  11 mg/dL (6-20)   01/31/21  04:14    


 


Creatinine  0.9 mg/dL (0.6-1.2)   01/31/21  04:14    


 


Estimated GFR (MDRD)  82  (>89)  L  01/31/21  04:14    


 


Glucose  104 mg/dL ()  H  01/31/21  04:14    


 


Lactic Acid  1.8 mmol/L (0.5-2.2)   01/30/21  14:48    


 


Calcium  8.6 mg/dL (8.5-10.3)   01/31/21  04:14    


 


Magnesium  2.4 mg/dL (1.7-2.8)   01/31/21  04:14    


 


Total Bilirubin  1.3 mg/dL (0.2-1.0)  H  01/30/21  14:11    


 


AST  15 IU/L (10-42)   01/30/21  14:11    


 


ALT  10 IU/L (10-60)   01/30/21  14:11    


 


Alkaline Phosphatase  50 IU/L ()   01/30/21  14:11    


 


Troponin I High Sens  12.6 ng/L (2.3-19.7)   01/30/21  14:11    


 


Total Protein  6.3 g/dL (6.7-8.2)  L  01/30/21  14:11    


 


Albumin  3.4 g/dL (3.2-5.5)   01/30/21  14:11    


 


Globulin  2.9 g/dL (2.1-4.2)   01/30/21  14:11    


 


Albumin/Globulin Ratio  1.2  (1.0-2.2)   01/30/21  14:11    


 


Lipase  27 U/L (22-51)   01/30/21  14:11    


 


Urine Color  YELLOW   01/30/21  14:32    


 


Urine Clarity  SL. CLOUDY  (CLEAR)   01/30/21  14:32    


 


Urine pH  6.5 PH (5.0-7.5)   01/30/21  14:32    


 


Ur Specific Gravity  1.010  (1.002-1.030)   01/30/21  14:32    


 


Urine Protein  TRACE mg/dL (NEGATIVE)   01/30/21  14:32    


 


Urine Glucose (UA)  NEGATIVE mg/dL (NEGATIVE)   01/30/21  14:32    


 


Urine Ketones  NEGATIVE mg/dL (NEGATIVE)   01/30/21  14:32    


 


Urine Occult Blood  SMALL  (NEGATIVE)  H  01/30/21  14:32    


 


Urine Nitrite  POSITIVE  (NEGATIVE)  H  01/30/21  14:32    


 


Urine Bilirubin  NEGATIVE  (NEGATIVE)   01/30/21  14:32    


 


Urine Urobilinogen  0.2 (NORMAL) E.U./dL (NORMAL)   01/30/21  14:32    


 


Ur Leukocyte Esterase  LARGE  (NEGATIVE)  H  01/30/21  14:32    


 


Urine RBC  0-5 /HPF (0-5)   01/30/21  14:32    


 


Urine WBC  >25 /HPF (0-3)  H  01/30/21  14:32    


 


Urine WBC Clumps  PRESENT   01/30/21  14:32    


 


Ur Squamous Epith Cells  RARE Squamous  (<= Few)   01/30/21  14:32    


 


Urine Bacteria  Many /HPF (None Seen)  H  01/30/21  14:32    


 


Ur Microscopic Review  INDICATED   01/30/21  14:32    


 


Urine Culture Comments  INDICATED   01/30/21  14:32    


 


Nasal Adenovirus (PCR)  NOT DETECTED   01/30/21  15:23    


 


Nasal B. parapertussis DNA (PCR)  NOT DETECTED   01/30/21  15:23    


 


Nasal Coronavir 229E PCR  NOT DETECTED   01/30/21  15:23    


 


Nasal Coronavir HKU1 PCR  NOT DETECTED   01/30/21  15:23    


 


Nasal Coronavir NL63 PCR  NOT DETECTED   01/30/21  15:23    


 


Nasal Coronavir OC43 PCR  NOT DETECTED   01/30/21  15:23    


 


Nasal Enterovir/Rhinovir PCR  NOT DETECTED   01/30/21  15:23    


 


Nasal Influenza B PCR  NOT DETECTED   01/30/21  15:23    


 


Nasal Influenza A PCR  NOT DETECTED   01/30/21  15:23    


 


Nasal Parainfluen 1 PCR  NOT DETECTED   01/30/21  15:23    


 


Nasal Parainfluen 2 PCR  NOT DETECTED   01/30/21  15:23    


 


Nasal Parainfluen 3 PCR  NOT DETECTED   01/30/21  15:23    


 


Nasal Parainfluen 4 PCR  NOT DETECTED   01/30/21  15:23    


 


Nasal RSV (PCR)  NOT DETECTED   01/30/21  15:23    


 


Nasal B.pertussis DNA PCR  NOT DETECTED   01/30/21  15:23    


 


Nasal C.pneumoniae (PCR)  NOT DETECTED   01/30/21  15:23    


 


Pranav Human Metapneumo PCR  NOT DETECTED   01/30/21  15:23    


 


Nasal M.pneumoniae (PCR)  NOT DETECTED   01/30/21  15:23    


 


Nasal SARS-CoV-2 (PCR)  NOT DETECTED   01/30/21  15:23

## 2021-02-01 VITALS — DIASTOLIC BLOOD PRESSURE: 69 MMHG | SYSTOLIC BLOOD PRESSURE: 128 MMHG

## 2021-02-01 LAB
ANION GAP SERPL CALCULATED.4IONS-SCNC: 6 MMOL/L (ref 6–13)
BASOPHILS NFR BLD AUTO: 0.1 10^3/UL (ref 0–0.1)
BASOPHILS NFR BLD AUTO: 0.8 %
BUN SERPL-MCNC: 8 MG/DL (ref 6–20)
CALCIUM UR-MCNC: 8.8 MG/DL (ref 8.5–10.3)
CHLORIDE SERPL-SCNC: 109 MMOL/L (ref 101–111)
CO2 SERPL-SCNC: 25 MMOL/L (ref 21–32)
CREAT SERPLBLD-SCNC: 0.9 MG/DL (ref 0.6–1.2)
EOSINOPHIL # BLD AUTO: 0.3 10^3/UL (ref 0–0.7)
EOSINOPHIL NFR BLD AUTO: 3.1 %
ERYTHROCYTE [DISTWIDTH] IN BLOOD BY AUTOMATED COUNT: 13.8 % (ref 12–15)
GLUCOSE SERPL-MCNC: 101 MG/DL (ref 70–100)
HGB UR QL STRIP: 13.2 G/DL (ref 14–18)
LYMPHOCYTES # SPEC AUTO: 1.9 10^3/UL (ref 1.5–3.5)
LYMPHOCYTES NFR BLD AUTO: 17.1 %
MCH RBC QN AUTO: 32.8 PG (ref 27–31)
MCHC RBC AUTO-ENTMCNC: 32.9 G/DL (ref 32–36)
MCV RBC AUTO: 99.5 FL (ref 80–94)
MONOCYTES # BLD AUTO: 1.3 10^3/UL (ref 0–1)
MONOCYTES NFR BLD AUTO: 11.6 %
NEUTROPHILS # BLD AUTO: 7.3 10^3/UL (ref 1.5–6.6)
NEUTROPHILS # SNV AUTO: 10.9 X10^3/UL (ref 4.8–10.8)
NEUTROPHILS NFR BLD AUTO: 67 %
PDW BLD AUTO: 10.8 FL (ref 7.4–11.4)
PLATELET # BLD: 246 10^3/UL (ref 130–450)
RBC MAR: 4.03 10^6/UL (ref 4.7–6.1)

## 2021-02-01 RX ADMIN — SODIUM CHLORIDE AND POTASSIUM CHLORIDE SCH MLS/HR: 9; 1.49 INJECTION, SOLUTION INTRAVENOUS at 01:10

## 2021-02-01 RX ADMIN — TAMSULOSIN HYDROCHLORIDE SCH MG: 0.4 CAPSULE ORAL at 08:48

## 2021-02-01 RX ADMIN — SODIUM CHLORIDE, PRESERVATIVE FREE SCH: 5 INJECTION INTRAVENOUS at 01:11

## 2021-02-01 RX ADMIN — OFLOXACIN SCH DRP: 3 SOLUTION OPHTHALMIC at 13:30

## 2021-02-01 RX ADMIN — DABIGATRAN ETEXILATE MESYLATE SCH MG: 75 CAPSULE ORAL at 08:47

## 2021-02-01 RX ADMIN — SODIUM CHLORIDE, PRESERVATIVE FREE SCH: 5 INJECTION INTRAVENOUS at 08:50

## 2021-02-01 RX ADMIN — DEXTROSE MONOHYDRATE SCH MLS/HR: 50 INJECTION, SOLUTION INTRAVENOUS at 08:48

## 2021-02-01 RX ADMIN — OFLOXACIN SCH DRP: 3 SOLUTION OPHTHALMIC at 10:00

## 2021-02-01 RX ADMIN — SODIUM CHLORIDE AND POTASSIUM CHLORIDE SCH MLS/HR: 9; 1.49 INJECTION, SOLUTION INTRAVENOUS at 11:08

## 2021-02-01 RX ADMIN — FAMOTIDINE SCH MG: 20 TABLET, FILM COATED ORAL at 08:48

## 2021-02-01 RX ADMIN — FINASTERIDE SCH MG: 5 TABLET, FILM COATED ORAL at 08:48

## 2021-02-01 NOTE — DISCHARGE SUMMARY
Discharge Summary


Admit Date: 01/30/21


Discharge Date: 02/01/21


Discharging Provider: Dr Pily Jarquin


Condition at Discharge: Fair


Discharge Disposition: 06 Home Health Service





- [facility]


History of Present Illness: 





This is a 76 year old white male with history of a stroke that left him with 

dementia and a neurogenic bladder, he has COPD, BPH and chronic Afib, on 

Pradaxa.  He lives with his wife who is his caregiver.  He has had prior 

admissions for UTIs, due to chronic indwelling Pineda and due to self cathing 

(previously he did this every 45 min overnight, presumably due to his dementia).

 He had his Pineda ordered to be removed in Sept 2020, and self cathing has been 

managed just 3 times a day now.  He saw his Urologist just 1 week ago.  Today, 

while walking in his home, supported by his wife, he had near syncope, but the 

wife sat him down and he felt better.  She called paramedics who found him to 

have a BP of 80/50 at the scene.  He was transported to the hospital.  He got iv

fluids but was still orthostatic in the ER, and is being placed in Observation 

for management of near syncope with iv hydration.  In addition, his WBC is 

elevated at 16 and urinalysis shows many WBC, positive Nitrates and Leukocyte 

Esterase and many bacteria, consistent with a UTI.  He will have the urine 

cultured and will be started on empiric antibiotics.  He is aware of himself and

location, but not aware of time and cannot give any detailed history of what 

happened and appears lethargic but not in distress.








- HOSPITAL COURSE


Hospital Course: 





(1) Orthostatic hypotension


Orthostatic vital signs were checked and he was orthostatic yet on the second 

day.   We continued with IV fluids and continued to treat the underlyingurinary 

tract  infection.  Orthostasis improved after 30 hours. His morning labs also 

showed resolution of his prerenal azotemia.  








(2) Near syncope


Etiology appeared to be dehydration, probably from his acute infection.  He was 

evaluated by PT on the day of discharge, since this near syncope happened while 

he was walking, and PT recommended home PT with [facility], which was ordered.








(3) E. coli UTI


The urine culture grew E coli, sensitive to everything but Amp and Bactrim.  He 

received iv Ceftrixone while here.  A CT of abdomen and pelvis was done and did 

not show nephrolithiasis or hydronephrosis.  It did show an enlarged urinary 

bladder (not unexpected because of his history).  His white blood count was 

elevated at 16.7, and is improved to 12 after starting IV antibiotics.  The 

Hospitalist called his Urologist, Dr Moreau, who reported that at the office visit

last week, the urinalysis there had also turned positive for E coli, and Dr Moreau

recommended discharge on Cipro 500 mg po bid for 7 days, which was done.








(4) Urinary retention


He was kept on Flomax and Proscar.  His bladder scans were showing up to 1500 cc

of residual urine in the bladder. This is due to his neurogenic bladder after 

having a stroke, and due to his BPH.  We first ordered straight cath 3 times 

daily, as at home, but eventually he did require inserting a Pineda catheter.  

This was also discussed with Dr Moreau and he advised to discharge him home with 

the Pineda, and he would remove it at an appointment to be arranged in 1 week.  

Home Health nursing was ordered for Pineda care.








(5) Dementia


We continued his home Donepezil and Quetiapine doses and he had no behavioral 

disturbances and was cooperative.


The patient's wife was requested to bring in his POLST form, for scanning into 

his hospital records.








(6) Chronic atrial fibrillation


His heart rate was in a good range, without any heart rate slowing meds, 

indicating that he has sick sinus syndrome or intrinsic conduction system 

disease.  He was kept on his home dose of Pradaxa for stroke prophylaxis.








(7) Conjunctvitis


On the morning of discharge, he was found to have yellow crusty discharge of 

both eyes.  Ofloxacin eyedrops were prescribed for home use.








- ALLERGIES


Allergies/Adverse Reactions: 


                                    Allergies











Allergy/AdvReac Type Severity Reaction Status Date / Time


 


No Known Drug Allergies Allergy   Verified 01/30/21 13:37














- MEDICATIONS


Home Medications: 


                                Ambulatory Orders











 Medication  Instructions  Recorded  Confirmed


 


Finasteride [Proscar] 5 mg PO DAILY  tablet 09/15/20 01/30/21


 


Tamsulosin [Flomax] 0.4 mg PO DAILY #30 capsule 09/15/20 01/30/21


 


Walker [Ultra-Light Rollator] 1 each MC DAILY #1 each 09/15/20 01/30/21


 


Donepezil [Aricept] 5 mg PO DAILY PM 01/30/21 01/30/21


 


QUEtiapine [SEROquel] 75 mg PO DAILY PM 01/30/21 01/31/21


 


Dabigatran Etexilate Mesylate 150 mg PO BID 01/31/21 01/31/21





[Pradaxa]   


 


Ciprofloxacin HCl [Cipro] 500 mg PO BID #14 tablet 02/01/21 


 


Ofloxacin 0.3% Ophth Drops 2 drops EACHEYE QID #1 bottle 02/01/21 





[Ocuflox 0.3% Ophth Drops]   














- PHYSICAL EXAM AT DISCHARGE


General Appearance: positive: No acute distress, Alert


Eyes Bilateral: positive: Other (Yellow crusty secretions of both eyelids)


Neck: positive: Nml inspection, No JVD


Respiratory: positive: No respiratory distress, Breath sounds nml


Cardiovascular: positive: No murmur, Irregularly irregular


Abdomen: positive: Non-tender, Nml bowel sounds, Other (A Pineda catheter is in 

place.)


Skin: positive: Warm, Dry


Extremities: positive: Non-tender, No pedal edema


Neurologic/Psychiatric: positive: Disoriented to time, Other (Alert, non-focal.)





- LABS


Result Diagrams: 


                                 02/01/21 08:50





                                 02/01/21 08:50





- DIAGNOSTIC IMAGING


Diagnostic Imaging Results: Final report reviewed





- FOLLOW UP


Follow Up: 





See PCP as per routine.  See Dr Moreau, Urologist in 1 week.








- TIME SPENT


Time Spent in Discharge (Minutes): 60

## 2021-02-01 NOTE — DISCHARGE PLAN
Discharge Plan


Problem Reviewed?: Yes


Disposition: 06 Home Health Service


Condition: Fair


Prescriptions: 


Ciprofloxacin HCl [Cipro] 500 mg PO BID #14 tablet


Ofloxacin 0.3% Ophth Drops [Ocuflox 0.3% Ophth Drops] 2 drops EACHEYE QID #1 

bottle


Diet: Regular


Activity Restrictions: Activity as Tolerated


Shower Restrictions: No


Driving Restrictions: Yes


Assistance Devices: Walker


Health Concerns: 


The patient was hospitalized for treating dehydration which was causing a low 

blood pressure, which caused him to nearly faint.  





The cause was found to be a new urinary tract infection plus significant urinary

retention.  A new Pineda catheter has been inserted and he is going home with 

this.  The urine culture has grown E. coli.  





The Hospitalist team spoke to his Urologist, Dr. Moreau at [facility] today, 

who advised the antibiotic Ciprofloxacin for 7 days and to continue with the new

Pineda, no straight cathing for now.





Dr. Moreau would like the patient to see him in 1 week in his Urology clinic, 

please make an appointment for that.





Home Health service has been reordered for helping manage the Pineda catheter.





Please resume all pre-hospital medications.





A new medication was electronically prescribed for eyedrops for conjunctivitis.





Plan of Treatment: 


As above.





Care Goals: 


Improvement in symptoms and stabilization are the goals.





Assessment: 


Written instructions are provided for the wife to take care of this patient at 

home.





No Smoking: If you smoke, Please STOP!  Call [phone] for help.


Follow-up with: 


Oleksandr Zamorano MD [Primary Care Provider] - 


Donald Moreau MD [Physician No Access] -

## 2021-02-05 ENCOUNTER — HOSPITAL ENCOUNTER (OUTPATIENT)
Dept: HOSPITAL 76 - DI.S | Age: 76
Discharge: HOME | End: 2021-02-05
Attending: PHYSICIAN ASSISTANT
Payer: MEDICARE

## 2021-02-05 DIAGNOSIS — M19.071: Primary | ICD-10-CM

## 2021-02-05 NOTE — XRAY REPORT
PROCEDURE: Toe(s) RT 

 

INDICATIONS:  RIGHT GREAT TOE PAIN

 

TECHNIQUE:  3 views of the right toe(s) acquired.  

 

COMPARISON:  None

 

FINDINGS:  

 

Bones:  No fractures or dislocations.  No suspicious bony lesions.  Moderate joint space narrowing an
d periarticular osteophyte formation at the first metatarsophalangeal joint. Mild periarticular osteo
phyte formation at the interphalangeal joints of the digits.

 

Soft tissues:  No suspicious soft tissue densities.  

 

IMPRESSION:  

1. Osteoarthritis.

2. No acute fracture. No osseous lesion. If symptoms and/or clinical suspicion for pathology continue
, further assessment with repeat plain films, or advanced imaging (e.g., CT, MRI, or bone scan) is re
commended for further assessment.

 

Reviewed by: Destin Akbar MD on 2/5/2021 12:20 PM PST

Approved by: Destin Akbar MD on 2/5/2021 12:20 PM PST

 

 

Station ID:  [id number]

## 2021-02-23 ENCOUNTER — HOSPITAL ENCOUNTER (OUTPATIENT)
Dept: HOSPITAL 76 - LAB.R | Age: 76
Discharge: HOME | End: 2021-02-23
Attending: INTERNAL MEDICINE
Payer: MEDICARE

## 2021-02-23 DIAGNOSIS — N39.0: Primary | ICD-10-CM

## 2021-02-23 LAB
CLARITY UR REFRACT.AUTO: (no result)
GLUCOSE UR QL STRIP.AUTO: NEGATIVE MG/DL
KETONES UR QL STRIP.AUTO: NEGATIVE MG/DL
MUCOUS THREADS URNS QL MICRO: (no result)
NITRITE UR QL STRIP.AUTO: POSITIVE
PH UR STRIP.AUTO: 6 PH (ref 5–7.5)
PROT UR STRIP.AUTO-MCNC: NEGATIVE MG/DL
RBC # UR STRIP.AUTO: (no result) /UL
RBC # URNS HPF: (no result) /HPF (ref 0–5)
SP GR UR STRIP.AUTO: 1.02 (ref 1–1.03)
SQUAMOUS URNS QL MICRO: (no result)
UROBILINOGEN UR QL STRIP.AUTO: (no result) E.U./DL
UROBILINOGEN UR STRIP.AUTO-MCNC: NEGATIVE MG/DL

## 2021-02-23 PROCEDURE — 87086 URINE CULTURE/COLONY COUNT: CPT

## 2021-02-23 PROCEDURE — 81003 URINALYSIS AUTO W/O SCOPE: CPT

## 2021-02-23 PROCEDURE — 81001 URINALYSIS AUTO W/SCOPE: CPT

## 2021-04-06 ENCOUNTER — HOSPITAL ENCOUNTER (EMERGENCY)
Dept: HOSPITAL 76 - ED | Age: 76
Discharge: HOME | End: 2021-04-06
Payer: MEDICARE

## 2021-04-06 ENCOUNTER — HOSPITAL ENCOUNTER (OUTPATIENT)
Age: 76
Discharge: ANOTHER HOSPITAL | End: 2021-04-06
Payer: MEDICARE

## 2021-04-06 VITALS — SYSTOLIC BLOOD PRESSURE: 125 MMHG | DIASTOLIC BLOOD PRESSURE: 69 MMHG

## 2021-04-06 DIAGNOSIS — R55: Primary | ICD-10-CM

## 2021-04-06 LAB
ALBUMIN DIAFP-MCNC: 3.8 G/DL (ref 3.2–5.5)
ALBUMIN/GLOB SERPL: 1.5 {RATIO} (ref 1–2.2)
ALP SERPL-CCNC: 48 IU/L (ref 42–121)
ALT SERPL W P-5'-P-CCNC: 11 IU/L (ref 10–60)
ANION GAP SERPL CALCULATED.4IONS-SCNC: 5 MMOL/L (ref 6–13)
AST SERPL W P-5'-P-CCNC: 16 IU/L (ref 10–42)
BASOPHILS NFR BLD AUTO: 0.1 10^3/UL (ref 0–0.1)
BASOPHILS NFR BLD AUTO: 0.6 %
BILIRUB BLD-MCNC: 0.8 MG/DL (ref 0.2–1)
BUN SERPL-MCNC: 15 MG/DL (ref 6–20)
CALCIUM UR-MCNC: 8.8 MG/DL (ref 8.5–10.3)
CHLORIDE SERPL-SCNC: 107 MMOL/L (ref 101–111)
CO2 SERPL-SCNC: 28 MMOL/L (ref 21–32)
CREAT SERPLBLD-SCNC: 1 MG/DL (ref 0.6–1.2)
EOSINOPHIL # BLD AUTO: 0.1 10^3/UL (ref 0–0.7)
EOSINOPHIL NFR BLD AUTO: 1.2 %
ERYTHROCYTE [DISTWIDTH] IN BLOOD BY AUTOMATED COUNT: 13.7 % (ref 12–15)
GFRSERPLBLD MDRD-ARVRAT: 72 ML/MIN/{1.73_M2} (ref 89–?)
GLOBULIN SER-MCNC: 2.5 G/DL (ref 2.1–4.2)
GLUCOSE SERPL-MCNC: 112 MG/DL (ref 70–100)
HCT VFR BLD AUTO: 42.2 % (ref 42–52)
HGB UR QL STRIP: 13.7 G/DL (ref 14–18)
INR PPP: 1.4 (ref 0.8–1.2)
LIPASE SERPL-CCNC: 33 U/L (ref 22–51)
LYMPHOCYTES # SPEC AUTO: 1.1 10^3/UL (ref 1.5–3.5)
LYMPHOCYTES NFR BLD AUTO: 11.3 %
MCH RBC QN AUTO: 32.2 PG (ref 27–31)
MCHC RBC AUTO-ENTMCNC: 32.5 G/DL (ref 32–36)
MCV RBC AUTO: 99.1 FL (ref 80–94)
MONOCYTES # BLD AUTO: 0.7 10^3/UL (ref 0–1)
MONOCYTES NFR BLD AUTO: 7.3 %
NEUTROPHILS # BLD AUTO: 7.8 10^3/UL (ref 1.5–6.6)
NEUTROPHILS # SNV AUTO: 9.9 X10^3/UL (ref 4.8–10.8)
NEUTROPHILS NFR BLD AUTO: 79.3 %
NRBC # BLD AUTO: 0 /100WBC
NRBC # BLD AUTO: 0 X10^3/UL
PDW BLD AUTO: 10.6 FL (ref 7.4–11.4)
PLAT MORPH BLD: (no result)
PLATELET # BLD: 219 10^3/UL (ref 130–450)
PLATELET BLD QL SMEAR: (no result)
POTASSIUM SERPL-SCNC: 4.1 MMOL/L (ref 3.5–5)
PROT SPEC-MCNC: 6.3 G/DL (ref 6.7–8.2)
PROTHROM ACT/NOR PPP: 15.5 SECS (ref 9.9–12.6)
RBC MAR: 4.26 10^6/UL (ref 4.7–6.1)
RBC MORPH BLD: (no result)
SODIUM SERPLBLD-SCNC: 140 MMOL/L (ref 135–145)
WBC MORPH BLD: (no result)

## 2021-04-06 PROCEDURE — 99283 EMERGENCY DEPT VISIT LOW MDM: CPT

## 2021-04-06 PROCEDURE — 36415 COLL VENOUS BLD VENIPUNCTURE: CPT

## 2021-04-06 PROCEDURE — 85610 PROTHROMBIN TIME: CPT

## 2021-04-06 PROCEDURE — 84484 ASSAY OF TROPONIN QUANT: CPT

## 2021-04-06 PROCEDURE — 85025 COMPLETE CBC W/AUTO DIFF WBC: CPT

## 2021-04-06 PROCEDURE — 83690 ASSAY OF LIPASE: CPT

## 2021-04-06 PROCEDURE — 93005 ELECTROCARDIOGRAM TRACING: CPT

## 2021-04-06 PROCEDURE — 80053 COMPREHEN METABOLIC PANEL: CPT

## 2021-04-06 PROCEDURE — 83880 ASSAY OF NATRIURETIC PEPTIDE: CPT

## 2021-04-06 NOTE — XRAY REPORT
PROCEDURE:  Chest 1 View X-Ray

 

INDICATIONS:  Chest Pain

 

TECHNIQUE:  One view of the chest was acquired.  

 

COMPARISON:  1/30/2021

 

FINDINGS:  

 

Surgical changes and devices:  None.  

 

Lungs and pleura:  No pleural effusions or pneumothorax.  Lungs are clear.  

 

Mediastinum:  Mediastinal contours appear normal.  Heart size is normal.  Small hiatal hernia is pres
ent.

 

Bones and chest wall:  No suspicious bony lesions.  Overlying soft tissues appear unremarkable.  

 

IMPRESSION:  

 

1. No acute process.

2. Small hiatal hernia, chronic.

 

Reviewed by: Charo Perez MD on 4/6/2021 10:29 AM [facility]YL[facility]

Approved by: Charo Perez MD on 4/6/2021 10:29 AM TYLER

 

 

Station ID:  [id number]

## 2021-04-06 NOTE — ED PHYSICIAN DOCUMENTATION
History of Present Illness





- Stated complaint


Stated Complaint: NEAR SYNCOPE





- Chief complaint


Chief Complaint: Neuro





- History obtained from


History obtained from: Patient





- Additonal information


Additional information: 


Patient comes emergency department chief complaint of a near syncopal episode 

that happened this morning.  He states that he has a history of syncope and near

syncope previously and he is not really sure what triggered this 1.  He denies 

any shortness of breath or chest pain.  No abdominal pain.  No nausea or 

vomiting.  No focal neurologic deficits.  He states he has not felt ill with 

anything recently.  No fevers or chills.  No cough.  No dysuria.  Patient states

that he was walking around his house when he suddenly began to feel lightheaded.

 He states that by the time he sat down, he was removing fruit.  He states that 

he could hear what was going on around him but was not awake enough to move or 

respond.  According to medics, the patient was hypotensive when they picked him 

up but rebounded in route.  Patient denies any other complaints at this time.  

He states he is not known to have any cardiac issues.  He has a history of 

borderline hypertension, he reports.








Review of Systems


Ten Systems: 10 systems reviewed and negative


Constitutional: reports: Reviewed and negative


Eyes: reports: Reviewed and negative


Ears: reports: Reviewed and negative


Nose: reports: Reviewed and negative


Throat: reports: Reviewed and negative


Cardiac: reports: Reviewed and negative


Respiratory: reports: Reviewed and negative


GI: reports: Reviewed and negative


: reports: Reviewed and negative


Skin: reports: Reviewed and negative


Musculoskeletal: reports: Reviewed and negative


Neurologic: reports: Syncope.  denies: Head injury


Psychiatric: reports: Reviewed and negative


Endocrine: reports: Reviewed and negative


Immunocompromised: reports: Reviewed and negative





PD PAST MEDICAL HISTORY





- Past Medical History


Cardiovascular: Hypertension, High cholesterol, Atrial fibrillation


Respiratory: None


Neuro: Dementia, CVA


Endocrine/Autoimmune: None


GI: None


: Benign prostate hypertrophy, Retention, Incontinence, Indwelling catheter


HEENT: None


Psych: Anxiety, Other


Musculoskeletal: Chronic back pain


Derm: None





- Past Surgical History


Past Surgical History: Yes


General: Splenectomy, Other


Ortho: Hip replacement, Knee replacement


HEENT: Tonsil/Adenoidectomy, Other





- Present Medications


Home Medications: 


                                Ambulatory Orders











 Medication  Instructions  Recorded  Confirmed


 


Finasteride [Proscar] 5 mg PO DAILY  tablet 09/15/20 01/30/21


 


Tamsulosin [Flomax] 0.4 mg PO DAILY #30 capsule 09/15/20 01/30/21


 


Walker [Ultra-Light Rollator] 1 each MC DAILY #1 each 09/15/20 01/30/21


 


Donepezil [Aricept] 5 mg PO DAILY PM 01/30/21 01/30/21


 


QUEtiapine [SEROquel] 75 mg PO DAILY PM 01/30/21 01/31/21


 


Dabigatran Etexilate Mesylate 150 mg PO BID 01/31/21 01/31/21





[Pradaxa]   


 


Ciprofloxacin HCl [Cipro] 500 mg PO BID #14 tablet 02/01/21 


 


Ofloxacin 0.3% Ophth Drops 2 drops EACHEYE QID #1 bottle 02/01/21 





[Ocuflox 0.3% Ophth Drops]   














- Allergies


Allergies/Adverse Reactions: 


                                    Allergies











Allergy/AdvReac Type Severity Reaction Status Date / Time


 


No Known Drug Allergies Allergy   Verified 04/06/21 10:26














- Social History


Does the pt smoke?: No


Smoking Status: Never smoker


Does the pt drink ETOH?: Yes


Does the pt have substance abuse?: No





- Immunizations


Immunizations are current?: Yes





- POLST


Patient has POLST: No


POLST Status: Full Code





PD ED PE NORMAL





- Vitals


Vital signs reviewed: Yes





- General


General: No acute distress, Well developed/nourished, Other (Patient is alert 

and well-appearing.)





- HEENT


HEENT: Atraumatic, PERRL, EOMI, Moist mucous membranes





- Neck


Neck: Supple, no meningeal sign





- Cardiac


Cardiac: RRR, No murmur, Strong equal pulses





- Respiratory


Respiratory: No respiratory distress, Clear bilaterally





- Abdomen


Abdomen: Soft, Non tender, Non distended





- Derm


Derm: Normal color, Warm and dry, No rash





- Extremities


Extremities: No deformity, No edema, No calf tenderness / cord





- Neuro


Neuro: CNs 2-12 intact, No motor deficit, No sensory deficit, Normal speech, 

Other (Patient is alert and appropriate and answers questions for himself.)





- Psych


Psych: Normal mood, Normal affect





Results





- Vitals


Vitals: 


                               Vital Signs - 24 hr











  04/06/21 04/06/21 04/06/21





  10:26 11:00 11:30


 


Temperature 36.2 C L  


 


Heart Rate 76 65 67


 


Respiratory 16 17 14





Rate   


 


Blood Pressure 111/72 122/75 120/70


 


O2 Saturation 98 98 99














  04/06/21





  12:00


 


Temperature 37.0 C


 


Heart Rate 60


 


Respiratory 15





Rate 


 


Blood Pressure 125/69


 


O2 Saturation 98








                                     Oxygen











O2 Source [With Activity]      Room air


 


O2 Source                      Room air

















- Labs


Labs: 


                                Laboratory Tests











  04/06/21 04/06/21 04/06/21





  11:05 11:05 11:05


 


WBC  9.9  


 


RBC  4.26 L  


 


Hgb  13.7 L  


 


Hct  42.2  


 


MCV  99.1 H  


 


MCH  32.2 H  


 


MCHC  32.5  


 


RDW  13.7  


 


Plt Count  219  


 


MPV  10.6  


 


Neut # (Auto)  7.8 H  


 


Lymph # (Auto)  1.1 L  


 


Mono # (Auto)  0.7  


 


Eos # (Auto)  0.1  


 


Baso # (Auto)  0.1  


 


Absolute Nucleated RBC  0.00  


 


Nucleated RBC %  0.0  


 


Manual Slide Review  Indicated  


 


WBC Morphology  NORMAL APPEARANCE  


 


Platelet Estimate  NORMAL (130-450,000)  


 


Platelet Morphology  NORMAL APPEARANCE  


 


RBC Morph Micro Appear  1+ MACROCYTOSIS  


 


PT   15.5 H 


 


INR   1.4 H 


 


Sodium    140


 


Potassium    4.1


 


Chloride    107


 


Carbon Dioxide    28


 


Anion Gap    5.0 L


 


BUN    15


 


Creatinine    1.0


 


Estimated GFR (MDRD)    72 L


 


Glucose    112 H


 


Calcium    8.8


 


Total Bilirubin    0.8


 


AST    16


 


ALT    11


 


Alkaline Phosphatase    48


 


Troponin I High Sens   


 


B-Natriuretic Peptide   


 


Total Protein    6.3 L


 


Albumin    3.8


 


Globulin    2.5


 


Albumin/Globulin Ratio    1.5


 


Lipase    33














  04/06/21 04/06/21





  11:05 11:05


 


WBC  


 


RBC  


 


Hgb  


 


Hct  


 


MCV  


 


MCH  


 


MCHC  


 


RDW  


 


Plt Count  


 


MPV  


 


Neut # (Auto)  


 


Lymph # (Auto)  


 


Mono # (Auto)  


 


Eos # (Auto)  


 


Baso # (Auto)  


 


Absolute Nucleated RBC  


 


Nucleated RBC %  


 


Manual Slide Review  


 


WBC Morphology  


 


Platelet Estimate  


 


Platelet Morphology  


 


RBC Morph Micro Appear  


 


PT  


 


INR  


 


Sodium  


 


Potassium  


 


Chloride  


 


Carbon Dioxide  


 


Anion Gap  


 


BUN  


 


Creatinine  


 


Estimated GFR (MDRD)  


 


Glucose  


 


Calcium  


 


Total Bilirubin  


 


AST  


 


ALT  


 


Alkaline Phosphatase  


 


Troponin I High Sens  12.0 


 


B-Natriuretic Peptide   224 H


 


Total Protein  


 


Albumin  


 


Globulin  


 


Albumin/Globulin Ratio  


 


Lipase  














- Rads (name of study)


  ** CXR


Radiology: Final report received, EMP read indepedently, See rad report (NAD)





PD MEDICAL DECISION MAKING





- ED course


Complexity details: reviewed old records, reviewed results, re-evaluated 

patient, considered differential, d/w patient


ED course: 


The patient was worked up with labs, EKG, urinalysis, and chest x-ray, all of 

which were unremarkable.  We have discussed the importance of drinking plenty of

fluids.  Pt has been given an IV fluid bolus in the ED.  We have discussed the 

usual indications for return.








Departure





- Departure


Disposition: 01 Home, Self Care


Clinical Impression: 


 Syncope





Condition: Stable


Instructions:  ED Syncope Vasovagal


Comments: 


Your labs, x-ray, and EKG all look good.  There is no evidence of an emergent 

cause of your fainting episode today.  Please follow-up with your primary care 

physician as needed if you continue to have these episodes of lightheadedness.  

Please also be sure to drink plenty of water to keep yourself well-hydrated and 

put yourself at less risk of fainting again.  If you become faint and also 

experienced chest pain and shortness of breath, you should return to the [facility] department immediately.


Discharge Date/Time: 04/06/21 12:14

## 2021-04-07 NOTE — EXTERNAL MEDICAL SUMMARY RPT
Continuity of Care Document

                            Created on:2021



Patient:BAIRON ALVAREZ

Sex:Male

:1945

External Reference #:[id number]





Demographics







                          Phone                     Unavailable

 

                          Preferred Language        Unknown

 

                          Marital Status            Unknown

 

                          [place] Affiliation     Unknown

 

                          Race                      Unknown

 

                          Ethnic Group              Unknown









Author







                          Organization              [facility]

 

                          Address                    [place] [id number]

 

                          Phone                     [phone]









Care Team Providers







                    Name                Role                Phone

 

                    PA-C                Unavailable         Unavailable









Problems







                     date                description         facility

 

                     2021            Acquired hallux rigidus  [facility]



                                                            [facility]

 

                     [id number]            Alcohol intake      [facility]

 

                     [id number]            Alcohol use         [facility]

 

                     [id number]            Details of drug misuse behavior  [facility]

 

                     [id number]            Exercise            [facility] [place]

[facility]

 

                     [id number]            FOOT COMPLETE MIN 3 VIEW  [facility]



                                                            [facility]

 

                     [id number]            Former smoker       [facility] [place]

 

                     [id number]            Hallux rigidus      [facility] [place]

[facility]

 

                     [id number]            Hallux rigidus, right foot  [facility]

 

                     [id number]            Health-related behavior  [facility]

 [facility]

 

                     [id number]            Pain in limb        [facility] [place]

[facility]

 

                     [id number]            Pain in toe         [facility]



                                                            [facility] [place]

 

                     [id number]            Pain in unspecified toe(s)  [facility]

 

                     [id number]            Tobacco smoking status NHIS  [facility]

 

                     [id number]            Tobacco use and exposure  [facility]







Medications







                     date                description         facility

 

                     2021            PREDNISONE          [facility]



                                                            [place]

 

                     [id number]            FINASTERIDE         [facility]



                                                            [place]

 

                     [id number]            COLCHICINE          [facility] [place]

[facility]



                                                            [place]

 

                     [id number]            CIPROFLOXACIN HCL   [facility] [place]

[facility]



                                                            [place]

 

                     [id number]            COLCHICINE          [facility] [place]

[facility]



                                                            [place]

 

                     [id number]            CIPROFLOXACIN HCL   [facility] [place]

[facility]



                                                            [place]

 

                     [id number]            FINASTERIDE         [facility]



                                                            [place]

 

                     2021            PREDNISONE          [facility]



                                                            [place]







Vital Signs







                 date            measurement     value           source

 

                 2021        BMI             27.31           kg/m2

 

                 2021        BP_diastolic    70              mm[Hg]

 

                 2021        BP_systolic     105             mm[Hg]

 

                 2021        heart_rate      95              /min

 

                 2021        height_metric   175.9           cm

 

                 2021        height_standard  69.25           in

 

                 2021        respiration_rate  17              /min

 

                 2021        temperature_metric  36.56           C

 

                 2021        temperature_standard  97.8            F

 

                 2021        weight_metric   84.19           kg

 

                 2021        weight_standard  185.6           lb







Social History







                     date                description         facility

 

                     [id number]

## 2021-05-23 ENCOUNTER — HOSPITAL ENCOUNTER (OUTPATIENT)
Dept: HOSPITAL 76 - LAB.S | Age: 76
Discharge: HOME | End: 2021-05-23
Attending: PHYSICIAN ASSISTANT
Payer: MEDICARE

## 2021-05-23 DIAGNOSIS — R30.0: Primary | ICD-10-CM

## 2021-05-23 PROCEDURE — 87086 URINE CULTURE/COLONY COUNT: CPT

## 2021-05-23 PROCEDURE — 87077 CULTURE AEROBIC IDENTIFY: CPT

## 2021-05-23 PROCEDURE — 87181 SC STD AGAR DILUTION PER AGT: CPT

## 2021-09-20 ENCOUNTER — HOSPITAL ENCOUNTER (OUTPATIENT)
Dept: HOSPITAL 76 - RT | Age: 76
Discharge: HOME | End: 2021-09-20
Attending: PSYCHIATRY & NEUROLOGY
Payer: MEDICARE

## 2021-09-20 DIAGNOSIS — Z79.899: ICD-10-CM

## 2021-09-20 DIAGNOSIS — F03.91: Primary | ICD-10-CM

## 2021-09-20 PROCEDURE — 93005 ELECTROCARDIOGRAM TRACING: CPT

## 2022-10-19 ENCOUNTER — HOSPITAL ENCOUNTER (OUTPATIENT)
Dept: HOSPITAL 76 - EMS | Age: 77
End: 2022-10-19
Payer: MEDICARE

## 2022-10-19 DIAGNOSIS — Z03.89: Primary | ICD-10-CM

## 2022-10-20 ENCOUNTER — HOSPITAL ENCOUNTER (OUTPATIENT)
Dept: HOSPITAL 76 - DI.S | Age: 77
Discharge: HOME | End: 2022-10-20
Attending: PHYSICIAN ASSISTANT
Payer: MEDICARE

## 2022-10-20 DIAGNOSIS — M25.562: Primary | ICD-10-CM

## 2022-10-20 DIAGNOSIS — Z96.652: ICD-10-CM

## 2022-10-20 NOTE — XRAY REPORT
PROCEDURE:  Knee 3 View LT

 

INDICATIONS:  LEFT KNEE PAIN

 

TECHNIQUE:  3 views of the left knee(s) were acquired.  

 

COMPARISON:  None.

 

FINDINGS:  

 

Bones:  No fractures or dislocations.  No suspicious bony lesions.  Knee arthroplasty is present. Fan
dware is intact without evidence of hardware fracture or periprosthetic lucency to suggest loosening.


 

Soft tissues:  Mild joint effusion.  No suspicious soft tissue calcifications.  

 

 

IMPRESSION:  

 

Knee arthroplasty as above.

 

Reviewed by: Kelley Alvarez MD on 10/20/2022 11:18 AM PDT

Approved by: Kelley Alvarez MD on 10/20/2022 11:18 AM PDT

 

 

Station ID:  [id number]

## 2023-02-01 ENCOUNTER — HOSPITAL ENCOUNTER (OUTPATIENT)
Dept: HOSPITAL 76 - EMS | Age: 78
End: 2023-02-01
Payer: MEDICARE

## 2023-02-01 DIAGNOSIS — Z03.89: Primary | ICD-10-CM

## 2023-02-02 ENCOUNTER — HOSPITAL ENCOUNTER (OUTPATIENT)
Dept: HOSPITAL 76 - EMS | Age: 78
Discharge: ANOTHER HOSPITAL | End: 2023-02-02
Payer: MEDICARE

## 2023-02-02 ENCOUNTER — HOSPITAL ENCOUNTER (EMERGENCY)
Dept: HOSPITAL 76 - ED | Age: 78
LOS: 5 days | Discharge: HOME | End: 2023-02-07
Payer: MEDICARE

## 2023-02-02 ENCOUNTER — HOSPITAL ENCOUNTER (EMERGENCY)
Dept: HOSPITAL 76 - ED | Age: 78
Discharge: HOME | End: 2023-02-02
Payer: MEDICARE

## 2023-02-02 VITALS — DIASTOLIC BLOOD PRESSURE: 74 MMHG | SYSTOLIC BLOOD PRESSURE: 142 MMHG

## 2023-02-02 DIAGNOSIS — Z20.822: ICD-10-CM

## 2023-02-02 DIAGNOSIS — F03.90: ICD-10-CM

## 2023-02-02 DIAGNOSIS — M54.9: Primary | ICD-10-CM

## 2023-02-02 DIAGNOSIS — Z76.4: ICD-10-CM

## 2023-02-02 DIAGNOSIS — F03.90: Primary | ICD-10-CM
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
## 2023-02-02 DIAGNOSIS — Z75.1: ICD-10-CM

## 2023-02-02 DIAGNOSIS — S32.010A: Primary | ICD-10-CM

## 2023-02-02 DIAGNOSIS — I10: ICD-10-CM

## 2023-02-02 DIAGNOSIS — M54.50: Primary | ICD-10-CM
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
## 2023-02-02 DIAGNOSIS — Z74.1: ICD-10-CM

## 2023-02-02 DIAGNOSIS — S32.010A: ICD-10-CM

## 2023-02-02 DIAGNOSIS — R33.9: ICD-10-CM

## 2023-02-02 LAB
ALBUMIN DIAFP-MCNC: 3.6 G/DL (ref 3.2–5.5)
ALBUMIN/GLOB SERPL: 1.4 {RATIO} (ref 1–2.2)
ALP SERPL-CCNC: 44 IU/L (ref 42–121)
ALT SERPL W P-5'-P-CCNC: 17 IU/L (ref 10–60)
ANION GAP SERPL CALCULATED.4IONS-SCNC: 9 MMOL/L (ref 6–13)
AST SERPL W P-5'-P-CCNC: 19 IU/L (ref 10–42)
B PARAPERT DNA SPEC QL NAA+PROBE: NOT DETECTED
B PERT DNA SPEC QL NAA+PROBE: NOT DETECTED
BASOPHILS NFR BLD AUTO: 0 10^3/UL (ref 0–0.1)
BASOPHILS NFR BLD AUTO: 0.4 %
BILIRUB BLD-MCNC: 0.9 MG/DL (ref 0.2–1)
BUN SERPL-MCNC: 15 MG/DL (ref 6–20)
C PNEUM DNA NPH QL NAA+NON-PROBE: NOT DETECTED
CALCIUM UR-MCNC: 8.4 MG/DL (ref 8.5–10.3)
CHLORIDE SERPL-SCNC: 101 MMOL/L (ref 101–111)
CLARITY UR REFRACT.AUTO: CLEAR
CO2 SERPL-SCNC: 25 MMOL/L (ref 21–32)
CREAT SERPLBLD-SCNC: 0.9 MG/DL (ref 0.6–1.2)
EOSINOPHIL # BLD AUTO: 0.2 10^3/UL (ref 0–0.7)
EOSINOPHIL NFR BLD AUTO: 1.6 %
ERYTHROCYTE [DISTWIDTH] IN BLOOD BY AUTOMATED COUNT: 13.3 % (ref 12–15)
FLUAV RNA RESP QL NAA+PROBE: NOT DETECTED
GFRSERPLBLD MDRD-ARVRAT: 81 ML/MIN/{1.73_M2} (ref 89–?)
GLOBULIN SER-MCNC: 2.6 G/DL (ref 2.1–4.2)
GLUCOSE SERPL-MCNC: 115 MG/DL (ref 70–100)
GLUCOSE UR QL STRIP.AUTO: NEGATIVE MG/DL
HAEM INFLU B DNA SPEC QL NAA+PROBE: NOT DETECTED
HCOV 229E RNA SPEC QL NAA+PROBE: NOT DETECTED
HCOV HKU1 RNA UPPER RESP QL NAA+PROBE: NOT DETECTED
HCOV NL63 RNA ASPIRATE QL NAA+PROBE: NOT DETECTED
HCOV OC43 RNA SPEC QL NAA+PROBE: NOT DETECTED
HCT VFR BLD AUTO: 41.9 % (ref 42–52)
HGB UR QL STRIP: 13.7 G/DL (ref 14–18)
HMPV AG SPEC QL: NOT DETECTED
HPIV1 RNA NPH QL NAA+PROBE: NOT DETECTED
HPIV2 SPEC QL CULT: NOT DETECTED
HPIV3 AB TITR SER CF: NOT DETECTED {TITER}
HPIV4 RNA SPEC QL NAA+PROBE: NOT DETECTED
INR PPP: 1.4 (ref 0.8–1.2)
KETONES UR QL STRIP.AUTO: NEGATIVE MG/DL
LIPASE SERPL-CCNC: 38 U/L (ref 22–51)
LYMPHOCYTES # SPEC AUTO: 1.3 10^3/UL (ref 1.5–3.5)
LYMPHOCYTES NFR BLD AUTO: 13.7 %
M PNEUMO DNA SPEC QL NAA+PROBE: NOT DETECTED
MCH RBC QN AUTO: 32.2 PG (ref 27–31)
MCHC RBC AUTO-ENTMCNC: 32.7 G/DL (ref 32–36)
MCV RBC AUTO: 98.4 FL (ref 80–94)
MONOCYTES # BLD AUTO: 0.9 10^3/UL (ref 0–1)
MONOCYTES NFR BLD AUTO: 9.5 %
NEUTROPHILS # BLD AUTO: 7.3 10^3/UL (ref 1.5–6.6)
NEUTROPHILS # SNV AUTO: 9.8 X10^3/UL (ref 4.8–10.8)
NEUTROPHILS NFR BLD AUTO: 74.4 %
NITRITE UR QL STRIP.AUTO: NEGATIVE
NRBC # BLD AUTO: 0 /100WBC
NRBC # BLD AUTO: 0 X10^3/UL
PDW BLD AUTO: 10.2 FL (ref 7.4–11.4)
PH UR STRIP.AUTO: 6 PH (ref 5–7.5)
PLATELET # BLD: 184 10^3/UL (ref 130–450)
POTASSIUM SERPL-SCNC: 3.9 MMOL/L (ref 3.5–5)
PROT SPEC-MCNC: 6.2 G/DL (ref 6.7–8.2)
PROT UR STRIP.AUTO-MCNC: NEGATIVE MG/DL
PROTHROM ACT/NOR PPP: 15.2 SECS (ref 9.9–12.6)
RBC # UR STRIP.AUTO: (no result) /UL
RBC MAR: 4.26 10^6/UL (ref 4.7–6.1)
RSV RNA RESP QL NAA+PROBE: NOT DETECTED
RV+EV RNA SPEC QL NAA+PROBE: NOT DETECTED
SARS-COV-2 RNA PNL SPEC NAA+PROBE: NOT DETECTED
SODIUM SERPLBLD-SCNC: 135 MMOL/L (ref 135–145)
SP GR UR STRIP.AUTO: 1.02 (ref 1–1.03)
SQUAMOUS URNS QL MICRO: (no result)
UROBILINOGEN UR QL STRIP.AUTO: (no result) E.U./DL
UROBILINOGEN UR STRIP.AUTO-MCNC: NEGATIVE MG/DL
WBC # UR MANUAL: (no result) /HPF (ref 0–3)

## 2023-02-02 PROCEDURE — 83690 ASSAY OF LIPASE: CPT

## 2023-02-02 PROCEDURE — 81003 URINALYSIS AUTO W/O SCOPE: CPT

## 2023-02-02 PROCEDURE — 81001 URINALYSIS AUTO W/SCOPE: CPT

## 2023-02-02 PROCEDURE — 99284 EMERGENCY DEPT VISIT MOD MDM: CPT

## 2023-02-02 PROCEDURE — 74177 CT ABD & PELVIS W/CONTRAST: CPT

## 2023-02-02 PROCEDURE — 87086 URINE CULTURE/COLONY COUNT: CPT

## 2023-02-02 PROCEDURE — 85025 COMPLETE CBC W/AUTO DIFF WBC: CPT

## 2023-02-02 PROCEDURE — 87635 SARS-COV-2 COVID-19 AMP PRB: CPT

## 2023-02-02 PROCEDURE — 72125 CT NECK SPINE W/O DYE: CPT

## 2023-02-02 PROCEDURE — 96374 THER/PROPH/DIAG INJ IV PUSH: CPT

## 2023-02-02 PROCEDURE — 51798 US URINE CAPACITY MEASURE: CPT

## 2023-02-02 PROCEDURE — 80053 COMPREHEN METABOLIC PANEL: CPT

## 2023-02-02 PROCEDURE — 70450 CT HEAD/BRAIN W/O DYE: CPT

## 2023-02-02 PROCEDURE — 80048 BASIC METABOLIC PNL TOTAL CA: CPT

## 2023-02-02 PROCEDURE — 87633 RESP VIRUS 12-25 TARGETS: CPT

## 2023-02-02 PROCEDURE — 71045 X-RAY EXAM CHEST 1 VIEW: CPT

## 2023-02-02 PROCEDURE — 36415 COLL VENOUS BLD VENIPUNCTURE: CPT

## 2023-02-02 PROCEDURE — 71260 CT THORAX DX C+: CPT

## 2023-02-02 PROCEDURE — 85610 PROTHROMBIN TIME: CPT

## 2023-02-02 PROCEDURE — 93005 ELECTROCARDIOGRAM TRACING: CPT

## 2023-02-02 RX ADMIN — ACETAMINOPHEN PRN MG: 500 TABLET ORAL at 18:37

## 2023-02-02 RX ADMIN — OXYCODONE PRN MG: 5 TABLET ORAL at 18:37

## 2023-02-02 NOTE — ED PHYSICIAN DOCUMENTATION
History of Present Illness





- Stated complaint


Stated Complaint: GLF





- Chief complaint


Chief Complaint: Trauma Ch/Bk





- History obtained from


History obtained from: Family (Patient's wife)





- Additonal information


Additional information: 


Patient is an 78-year-old male with a history of dementia, on Pradaxa presenting

for evaluation after a fall and continued low back pain.  He was seen this 

morning after a fall last night at home.  He was evaluated with a CT head, 

cervical spine, chest abdomen pelvis and found to have an L1 compression 

fracture.He was initially given fentanyl from EMS and was very drowsy and not 

able to give much information but once that had metabolized he was much more 

lucid and able to tell us where his pain was.  He was tolerating p.o. pain 

medications and is able to ambulate here with a walker.Wife took him home.  When

they got home she states that he had difficulties getting out of the car and she

was having trouble helping him and he slumped down.  He did not hit his head or 

have LOC.  She was able to get some neighbors to come help and get him into the 

home and placed him into a recliner.  She was not able to get the pain 

medication filled at [facility] as they did not have the prescription ready yet.He

slept for a few hours and when he woke up wanted to use the bathroom and could 

not get back up due to having severe pain in his low back.EMS gave 100 mcg of 

fentanyl for transport.








Review of Systems


Unable to obtain: Dementia


Constitutional: reports: Fever





PD PAST MEDICAL HISTORY





- Past Medical History


Cardiovascular: Hypertension, High cholesterol, Atrial fibrillation


Respiratory: None


Neuro: Dementia, CVA


Endocrine/Autoimmune: None


GI: None


: Benign prostate hypertrophy, Retention, Incontinence, Indwelling catheter


HEENT: None


Psych: Anxiety, Other


Musculoskeletal: Chronic back pain


Derm: None





- Past Surgical History


Past Surgical History: Yes


General: Splenectomy, Other


Ortho: Hip replacement, Knee replacement


HEENT: Tonsil/Adenoidectomy, Other





- Present Medications


Home Medications: 


                                Ambulatory Orders











 Medication  Instructions  Recorded  Confirmed


 


Finasteride [Proscar] 5 mg PO DAILY  tablet 09/15/20 01/30/21


 


Tamsulosin [Flomax] 0.4 mg PO DAILY #30 capsule 09/15/20 01/30/21


 


Walker [Ultra-Light Rollator] 1 each MC DAILY #1 each 09/15/20 01/30/21


 


Donepezil [Aricept] 5 mg PO DAILY PM 01/30/21 01/30/21


 


QUEtiapine [SEROquel] 75 mg PO DAILY PM 01/30/21 01/31/21


 


Dabigatran Etexilate Mesylate 150 mg PO BID 01/31/21 01/31/21





[Pradaxa]   


 


Ciprofloxacin HCl [Cipro] 500 mg PO BID #14 tablet 02/01/21 


 


Ofloxacin 0.3% Ophth Drops 2 drops EACHEYE QID #1 bottle 02/01/21 





[Ocuflox 0.3% Ophth Drops]   


 


Lidocaine Patch 5% [Lidoderm Patch] 1 patch TOP DAILY PRN #10 patch 02/02/23 


 


Oxycodone HCl/Acetaminophen 1 each PO Q6H PRN #14 tablet 02/02/23 





[Percocet 5-325 mg Tablet]   














- Allergies


Allergies/Adverse Reactions: 


                                    Allergies











Allergy/AdvReac Type Severity Reaction Status Date / Time


 


No Known Drug Allergies Allergy   Verified 04/06/21 10:26














- Social History


Does the pt smoke?: No


Smoking Status: Never smoker


Does the pt drink ETOH?: Yes


Does the pt have substance abuse?: No





- Immunizations


Immunizations are current?: Yes





- POLST


Patient has POLST: No


POLST Status: Full Code





PD ED PE NORMAL





- General


General: No acute distress, Well developed/nourished, Other (Alert and oriented 

to person and place)





- HEENT


HEENT: Atraumatic, PERRL, EOMI, Moist mucous membranes, Pharynx benign





- Neck


Neck: Supple, no meningeal sign





- Cardiac


Cardiac: RRR





- Respiratory


Respiratory: No respiratory distress





- Abdomen


Abdomen: Soft, Non tender, Non distended





- Back


Back: Other (Low lumbar tenderness to palpation)





- Extremities


Extremities: No tenderness to palpate





- Neuro


Neuro: No motor deficit, No sensory deficit, Normal speech.  No: Alert and 

oriented X 3 (Alert and oriented to person and place)


Eye Opening: Spontaneous


Motor: Obeys Commands


Verbal: Confused


GCS Score: 14





Results





- Vitals


Vitals: 


                               Vital Signs - 24 hr











  02/02/23





  16:56


 


Temperature 37.0 C


 


Heart Rate 82


 


Respiratory 19





Rate 


 


Blood Pressure 167/105 H


 


O2 Saturation 94








                                     Oxygen











O2 Source [With Activity]      Room air


 


O2 Source                      Room air

















PD Medical Decision Making





- ED course


ED course: 


Patient is seen in the emergency department for the second time today after a 

fall.  He was seenThis morning after a fall and found to have an L1 compression 

fracture.  He did have another fall when he was discharged home but did not hit 

his head and did not sound like he had significant trauma with the second 

fall.He was helped back into a chair.  And the wife was not able to get his pain

medications when he woke up from his nap had severe pain and she was unsure what

to do so called EMS.Patient's exam here appears unchanged.  He had a thorough 

work-up this morning with a CT of his head, cervical spine chest abdomen and 

pelvis and I do not feel he needs a repeat.  His labs were also reviewed from 

this morning without significant findings.At this time her goal will be pain 

control and to ambulate the patient.  Wife states that she is not able to take 

him home this evening as she does not drive at night.  I offered to send home 

pain medication with him and we could arrange for ambulance transfer due to his 

dementia and compression fracture but she states that she does not feel 

comfortable with this plan and cannot accept him home tonight.We will plan to 

have social work evaluate the patient and speak to the wife tomorrow to see how 

we can further support them at home.  At this time I do not feel he needs 

admission to the hospital for his L1 compression fracture.








Patient will be signed out at shift change.








Departure





- Departure


Clinical Impression: 


 Compression fracture of L1 lumbar vertebra, Dementia





Condition: Stable

## 2023-02-02 NOTE — CT REPORT
PROCEDURE:  ABDOMEN/PELVIS W

 

INDICATIONS:  fall/pain

 

CONTRAST: 100ml OMnipaque 300 

 

TECHNIQUE:  

After the administration of IV contrast, 5 mm thick sections acquired from the diaphragms to the symp
hysis.  5 mm thick coronal and sagittal reformats were acquired.  For radiation dose reduction, the f
ollowing was used:  automated exposure control, adjustment of mA and/or kV according to patient size.
  

 

COMPARISON:  CT of abdomen and pelvis dated 1/31/2021 and 9/11/2020.

 

FINDINGS:  

Image quality:  Excellent.  

 

ABDOMEN:  

Lung bases: Dependent atelectasis in posterior aspect of bilateral lung bases are seen. Heart size is
 enlarged, no pericardial effusion.  

 

Solid organs: Liver is normal in size and enhancement. Again noted is prior splenectomy and multiple 
soft tissue masses seen in high left upper quadrant consistent with splenosis unchanged from prior st
udies. Gallbladder is within normal limits.  Biliary system is non dilated.  Pancreas enhances normal
ly.  No adrenal nodules.  Kidneys demonstrate normal size and enhancement, without hydronephrosis.  B
ilateral renal cysts are seen not significantly changed from prior studies.

 

Peritoneum and bowel:  Bowel loops demonstrate normal wall thickness and caliber.  No free fluid or a
ir.  

 

Nodes and vessels:  No retroperitoneal or mesenteric adenopathy by size criteria.  Aorta and inferior
 vena cava are normal in size.  Moderate atherosclerotic calcifications throughout abdominal aorta an
d bilateral iliac vessels are noted.

 

Miscellaneous: Small umbilical hernia is noted containing fat only.

 

 

PELVIS:  

Genitourinary:  Bladder wall thickness is normal.  Large right posterior lateral bladder wall diverti
culum is again seen unchanged from prior studies. No discrete bladder wall mass is noted.

 

Miscellaneous:  No inguinal hernias or adenopathy.  Large right hydrocele is seen.

 

Bones:  Patient is status post left total hip arthroplasty. No gross hardware loosening or failure is
 seen. No suspicious bony lesions. Acute appearing compression fracture involving superior endplate o
f L1 with up to 20% loss of L1 vertebral body height anteriorly. No other compression fracture. Degen
erative disc disease throughout lower thoracic and lumbar spine is seen. 

 

IMPRESSION:  

1. No acute solid organ injury within abdomen or pelvis.

2. Acute appearing compression deformity involving superior endplate of L1 as above. No significant r
etropulsion of the posterior wall. Degenerative disc disease throughout lower thoracic and lumbar spi
ne. No other fracture or dislocation is seen. Prior left total hip arthroplasty.

3. Chronic findings as above, unchanged from prior studies.

 

Reviewed by: Monico Bermudez MD on 2/2/2023 9:32 AM PST

Approved by: Monico Bermudez MD on 2/2/2023 9:32 AM PST

 

 

Station ID:  [id number]

## 2023-02-02 NOTE — CT REPORT
PROCEDURE:  CERVICAL SPINE WO

 

INDICATIONS:  fall/dementia

 

TECHNIQUE:  

Noncontrast 3 mm thick sections acquired from the skull base to the T4 level.  Sagittal and coronal r
eformats were then constructed.  For radiation dose reduction, the following was used:  automated exp
osure control, adjustment of mA and/or kV according to patient size.

 

COMPARISON:  None.

 

FINDINGS:  

Image quality:  Excellent.  

 

Bones:  No fractures or dislocations.  There is mild reversal of normal cervical lordosis. Loss of di
sc height, degenerative endplate changes and bilateral facet hypertrophic changes are noted throughou
t cervical spine with dorsal disc osteophyte complex formation causing mild-to-moderate central canal
 stenosis and bilateral neural foraminal narrowing most notably at C5-6 and C6-7 levels Visualized jordan
perior ribs are intact.  

 

Soft tissues:  Prevertebral soft tissues are normal in thickness.  No paravertebral hematomas.  No ap
ical pneumothoraces.  

 

IMPRESSION:  

 

1. No acute cervical spine fracture or dislocation.

2. Degenerative disc disease throughout cervical spine as above.

 

Reviewed by: Monico Bermudez MD on 2/2/2023 9:04 AM PST

Approved by: Monico Bermudez MD on 2/2/2023 9:04 AM PST

 

 

Station ID:  [id number]

## 2023-02-02 NOTE — XRAY REPORT
PROCEDURE:  Chest 1 View X-Ray

 

INDICATIONS:  fall

 

TECHNIQUE:  One view of the chest was acquired.  

 

COMPARISON:  Chest x-ray 4/6/2021.

 

FINDINGS:  

 

Surgical changes and devices:  None.  

 

Lungs and pleura:  No pleural effusions or pneumothorax.  Lungs are clear.  

 

Mediastinum:  Mediastinal contours appear normal.  Heart size is normal.  

 

Bones and chest wall:  No suspicious bony lesions.  Overlying soft tissues appear unremarkable.  

 

 

IMPRESSION:  

 

No acute pulmonary process.

 

Reviewed by: Kelley Alvarez MD on 2/2/2023 10:21 AM PST

Approved by: Kelley Alvarez MD on 2/2/2023 10:21 AM [facility]

 

 

Station ID:  [id number]

## 2023-02-02 NOTE — ED PHYSICIAN DOCUMENTATION
History of Present Illness





- Stated complaint


Stated Complaint: GLF





- Chief complaint


Chief Complaint: General





- History obtained from


History obtained from: Family, EMS (Patient's wife)





- Additonal information


Additional information: 


Patient is an 78-year-old male with a history of dementia presenting for 

evaluation after a ground-level fall at home yesterday.  Patient received 

fentanyl from EMS and is not able to provide any meaningful history. He is not 

able to tell me what hurts or about any recent events. Per his wife he does have

some baseline confusion. Yesterday he had a fall at home around 10 PM.  He was 

in his bedroom with his wife standing behind him.  He was trying to get to the 

bathroom and his pants were undone.  He started to urinate and she believes that

made him panic and he turned quickly and fell.  He did hit his head.  He did not

have LOC.  He was not able to get up from the floor.  She called EMS who 

evaluated him and did not feel he required transport at that time and it lifted 

him back into his bed.  Contrary to this morning's report he did not ambulate 

after the fall per the wife.  He was complaining to her that his lower back was 

hurting.This morning she was unable to get him out of bed and called EMS.  He is

on Pradaxa.








Review of Systems


Unable to obtain: Dementia





PD PAST MEDICAL HISTORY





- Past Medical History


Cardiovascular: Hypertension, High cholesterol, Atrial fibrillation


Respiratory: None


Neuro: Dementia, CVA


Endocrine/Autoimmune: None


GI: None


: Benign prostate hypertrophy, Retention, Incontinence, Indwelling catheter


HEENT: None


Psych: Anxiety, Other


Musculoskeletal: Chronic back pain


Derm: None





- Past Surgical History


Past Surgical History: Yes


General: Splenectomy, Other


Ortho: Hip replacement, Knee replacement


HEENT: Tonsil/Adenoidectomy, Other





- Present Medications


Home Medications: 


                                Ambulatory Orders











 Medication  Instructions  Recorded  Confirmed


 


Finasteride [Proscar] 5 mg PO DAILY  tablet 09/15/20 01/30/21


 


Tamsulosin [Flomax] 0.4 mg PO DAILY #30 capsule 09/15/20 01/30/21


 


Walker [Ultra-Light Rollator] 1 each MC DAILY #1 each 09/15/20 01/30/21


 


Donepezil [Aricept] 5 mg PO DAILY PM 01/30/21 01/30/21


 


QUEtiapine [SEROquel] 75 mg PO DAILY PM 01/30/21 01/31/21


 


Dabigatran Etexilate Mesylate 150 mg PO BID 01/31/21 01/31/21





[Pradaxa]   


 


Ciprofloxacin HCl [Cipro] 500 mg PO BID #14 tablet 02/01/21 


 


Ofloxacin 0.3% Ophth Drops 2 drops EACHEYE QID #1 bottle 02/01/21 





[Ocuflox 0.3% Ophth Drops]   


 


Lidocaine Patch 5% [Lidoderm Patch] 1 patch TOP DAILY PRN #10 patch 02/02/23 


 


Oxycodone HCl/Acetaminophen 1 each PO Q6H PRN #14 tablet 02/02/23 





[Percocet 5-325 mg Tablet]   














- Allergies


Allergies/Adverse Reactions: 


                                    Allergies











Allergy/AdvReac Type Severity Reaction Status Date / Time


 


No Known Drug Allergies Allergy   Verified 04/06/21 10:26














- Social History


Does the pt smoke?: No


Smoking Status: Never smoker


Does the pt drink ETOH?: Yes


Does the pt have substance abuse?: No





- Immunizations


Immunizations are current?: Yes





- POLST


Patient has POLST: No


POLST Status: Full Code





PD ED PE NORMAL





- General


General: No acute distress, Well developed/nourished.  No: Alert and oriented X 

3 (Alert and oriented to person only)





- HEENT


HEENT: Atraumatic, PERRL, EOMI, Moist mucous membranes, Pharynx benign





- Neck


Neck: No bony TTP.  No: C-Spine cleared by NEXUS criteria





- Cardiac


Cardiac: RRR





- Respiratory


Respiratory: No respiratory distress, Clear bilaterally





- Abdomen


Abdomen: Soft, Non tender, Non distended





- Back


Back: Other (Lumbar tenderness to palpation; Prefers to lay on his side)





- Derm


Derm: Warm and dry





- Extremities


Extremities: No deformity, No tenderness to palpate





- Neuro


Neuro: No: Alert and oriented X 3


Eye Opening: Spontaneous


Motor: Localizes to Pain


Verbal: Confused


GCS Score: 13





Results





- Vitals


Vitals: 


                               Vital Signs - 24 hr











  02/02/23 02/02/23 02/02/23





  07:50 08:04 10:48


 


Temperature 36.7 C  


 


Heart Rate 68 70 74


 


Respiratory 10 L 16 18





Rate   


 


Blood Pressure 127/80 152/97 H 142/74 H


 


O2 Saturation 91 L 98 99


 


If not protocol  2 





: Oxygen Flow,   





liters/minute   








                                     Oxygen











O2 Source []                   Room air


 


O2 Source                      Room air

















- EKG (time done)


  ** 0802


Rate: Rate (enter#) (57)


Rhythm: Atrial fibrillation


Intervals: No: Prolonged QT


Ischemia: No: ST elevation c/w ischemia





- Labs


Labs: 


                                Laboratory Tests











  02/02/23 02/02/23 02/02/23





  07:56 07:56 07:56


 


WBC  9.8  


 


RBC  4.26 L  


 


Hgb  13.7 L  


 


Hct  41.9 L  


 


MCV  98.4 H  


 


MCH  32.2 H  


 


MCHC  32.7  


 


RDW  13.3  


 


Plt Count  184  


 


MPV  10.2  


 


Neut # (Auto)  7.3 H  


 


Lymph # (Auto)  1.3 L  


 


Mono # (Auto)  0.9  


 


Eos # (Auto)  0.2  


 


Baso # (Auto)  0.0  


 


Absolute Nucleated RBC  0.00  


 


Nucleated RBC %  0.0  


 


PT    15.2 H


 


INR    1.4 H


 


Sodium   135 


 


Potassium   3.9 


 


Chloride   101 


 


Carbon Dioxide   25 


 


Anion Gap   9.0 


 


BUN   15 


 


Creatinine   0.9 


 


Estimated GFR (MDRD)   81 L 


 


Glucose   115 H 


 


Calcium   8.4 L 


 


Total Bilirubin   0.9 


 


AST   19 


 


ALT   17 


 


Alkaline Phosphatase   44 


 


Total Protein   6.2 L 


 


Albumin   3.6 


 


Globulin   2.6 


 


Albumin/Globulin Ratio   1.4 


 


Lipase   38 


 


Urine Color   


 


Urine Clarity   


 


Urine pH   


 


Ur Specific Gravity   


 


Urine Protein   


 


Urine Glucose (UA)   


 


Urine Ketones   


 


Urine Occult Blood   


 


Urine Nitrite   


 


Urine Bilirubin   


 


Urine Urobilinogen   


 


Ur Leukocyte Esterase   


 


Urine RBC   


 


Urine WBC   


 


Ur Squamous Epith Cells   


 


Urine Bacteria   


 


Ur Microscopic Review   


 


Urine Culture Comments   


 


Nasal Adenovirus (PCR)   


 


Nasal B. parapertussis DNA (PCR)   


 


Nasal Coronavir 229E PCR   


 


Nasal Coronavir HKU1 PCR   


 


Nasal Coronavir NL63 PCR   


 


Nasal Coronavir OC43 PCR   


 


Nasal Enterovir/Rhinovir PCR   


 


Nasal Influenza B PCR   


 


Nasal Influenza A PCR   


 


Nasal Parainfluen 1 PCR   


 


Nasal Parainfluen 2 PCR   


 


Nasal Parainfluen 3 PCR   


 


Nasal Parainfluen 4 PCR   


 


Nasal RSV (PCR)   


 


Nasal B.pertussis DNA PCR   


 


Nasal C.pneumoniae (PCR)   


 


Pranav Human Metapneumo PCR   


 


Nasal M.pneumoniae (PCR)   


 


Nasal SARS-CoV-2 (PCR)   














  02/02/23 02/02/23





  08:02 09:12


 


WBC  


 


RBC  


 


Hgb  


 


Hct  


 


MCV  


 


MCH  


 


MCHC  


 


RDW  


 


Plt Count  


 


MPV  


 


Neut # (Auto)  


 


Lymph # (Auto)  


 


Mono # (Auto)  


 


Eos # (Auto)  


 


Baso # (Auto)  


 


Absolute Nucleated RBC  


 


Nucleated RBC %  


 


PT  


 


INR  


 


Sodium  


 


Potassium  


 


Chloride  


 


Carbon Dioxide  


 


Anion Gap  


 


BUN  


 


Creatinine  


 


Estimated GFR (MDRD)  


 


Glucose  


 


Calcium  


 


Total Bilirubin  


 


AST  


 


ALT  


 


Alkaline Phosphatase  


 


Total Protein  


 


Albumin  


 


Globulin  


 


Albumin/Globulin Ratio  


 


Lipase  


 


Urine Color   DARK YELLOW


 


Urine Clarity   CLEAR


 


Urine pH   6.0


 


Ur Specific Gravity   1.025


 


Urine Protein   NEGATIVE


 


Urine Glucose (UA)   NEGATIVE


 


Urine Ketones   NEGATIVE


 


Urine Occult Blood   MODERATE H


 


Urine Nitrite   NEGATIVE


 


Urine Bilirubin   NEGATIVE


 


Urine Urobilinogen   0.2 (NORMAL)


 


Ur Leukocyte Esterase   NEGATIVE


 


Urine RBC   11-25 H


 


Urine WBC   0-3


 


Ur Squamous Epith Cells   NONE SEEN


 


Urine Bacteria   Few


 


Ur Microscopic Review   INDICATED


 


Urine Culture Comments   NOT INDICATED


 


Nasal Adenovirus (PCR)  NOT DETECTED 


 


Nasal B. parapertussis DNA (PCR)  NOT DETECTED 


 


Nasal Coronavir 229E PCR  NOT DETECTED 


 


Nasal Coronavir HKU1 PCR  NOT DETECTED 


 


Nasal Coronavir NL63 PCR  NOT DETECTED 


 


Nasal Coronavir OC43 PCR  NOT DETECTED 


 


Nasal Enterovir/Rhinovir PCR  NOT DETECTED 


 


Nasal Influenza B PCR  NOT DETECTED 


 


Nasal Influenza A PCR  NOT DETECTED 


 


Nasal Parainfluen 1 PCR  NOT DETECTED 


 


Nasal Parainfluen 2 PCR  NOT DETECTED 


 


Nasal Parainfluen 3 PCR  NOT DETECTED 


 


Nasal Parainfluen 4 PCR  NOT DETECTED 


 


Nasal RSV (PCR)  NOT DETECTED 


 


Nasal B.pertussis DNA PCR  NOT DETECTED 


 


Nasal C.pneumoniae (PCR)  NOT DETECTED 


 


Pranav Human Metapneumo PCR  NOT DETECTED 


 


Nasal M.pneumoniae (PCR)  NOT DETECTED 


 


Nasal SARS-CoV-2 (PCR)  NOT DETECTED 














PD Medical Decision Making





- ED course


Complexity details: reviewed results, re-evaluated patient, d/w patient, d/w 

family


ED course: 








0954 -Patient seems much more awake.  He is easily able to tell me his name and 

that he fell yesterday and knows that he is at a hospital and would be.  He is 

able to tell me that he has pain in his low back.  I did review his images with 

him and reviewed the findings of a compression fracture in his back.  Discussed 

plan for p.o. pain medications as well as lidocaine patch and reassessment.








Patient presenting for evaluation after a ground-level fall last night where he 

did strike his head.  He is on Pradaxa.  His CT head and C-spine were obtained. 

 Patient received fentanyl from EMS and it was not able to provide much history 

initially so a CT of the chest and abdomen pelvis were also done.I reviewed 

these images as well and that there appears to be a compression fracture of L1. 

 No other deformities noted.Patient appeared improved once fentanyl had worn off

 and was able to tell me that he was at the hospital being evaluated after a 

fall and that he hurt in his low back. Labs were reviewed.  Patient has baseline

 anemia which is unchanged. He did tolerate p.o. pain medication and was able to

 ambulate steadily with a walker.  His wife came to the emergency department and

 they were comfortable with plan for discharge with continued pain medication.








Departure





- Departure


Disposition: 01 Home, Self Care


Clinical Impression: 


 Fall at home, Compression fracture of L1 lumbar vertebra





Condition: Stable


Instructions:  ED Fx Comp Vertebral


Prescriptions: 


Lidocaine Patch 5% [Lidoderm Patch] 1 patch TOP DAILY PRN #10 patch


 PRN Reason: pain


Oxycodone HCl/Acetaminophen [Percocet 5-325 mg Tablet] 1 each PO Q6H PRN #14 

tablet


 PRN Reason: pain


Comments: 


You have a compression fracture of your L1 vertebra which is in your low back.  

I have sent a prescription for pain medication and lidocaine patches to [facility]

in [place].  I would recommend close follow-up with your primary care doctor.  

I would recommend that you continue to use a walker to help you get around at 

home.  If you have any new or worsening symptoms please consider return to the 

emergency department.





I am prescribing a short course of narcotic pain medication for you. These are 

potentially dangerous and addictive medications that should be used carefully.


These medications may constipate you. Take an over-the-counter stool softener 

(docusate) twice daily with plenty of water while taking these medications. If 

you go 24 hours without a bowel movement, take over-the-counter miralax, per 

package instructions.


Do not drink or drive while taking these medications.


If you received narcotic or sedating medications while in the emergency 

department, do not drive for 24 hours.


Store this medication in a safe, secure place and out of reach of children.


It is a violation of federal law to give or sell this medication to another 

person or to use in a manner other than prescribed.


The ED will not refill narcotic prescriptions, including prescriptions lost or 

stolen.


To dispose of unwanted medications:


1. [facility] at [address] in 

[place] has a medication drop box. They accept prescription medications (in 

pill form) Monday through Friday 9:00 a.m. to 5:00 p.m.


2. [facility] [employer] accepts prescription medications (in

pill form only) for disposal year round. Call [phone] for more informati

on.


3. Contact the [facility] for the next [facility] sponsored prescription 

drug collection event. [phone], [phone], or [phone] 

[phone];


Note that many narcotic pain relievers also contain Tylenol/acetaminophen. 

Please ensure that your total dose of acetaminophen from all sources does not 

exceed 3 g (3000 mg) per day.


Discharge Date/Time: 02/02/23 11:20

## 2023-02-02 NOTE — CT REPORT
PROCEDURE:  CHEST W

 

INDICATIONS:  fall/pain

 

CONTRAST:100ml OMnipaque 300 

 

TECHNIQUE:  

After the administration of intravenous contrast, 1 mm axial images were acquired from the pulmonary 
apices through the posterior costophrenic angles.  Axial 5 mm soft tissue kernel reconstructions were
 performed as well as 8 mm axial MIP and coronal and sagittal 5 mm reformations.  For radiation dose 
reduction, the following was used:  automated exposure control, adjustment of mA and/or kV according 
to patient size.

 

COMPARISON:  None.

 

FINDINGS:  

Image quality:  Excellent.  

 

Lungs and pleura: Dependent atelectasis in posterior aspect of bilateral lung fields are seen. No isaias
dence of pulmonary contusion or laceration. No acute air space opacities.  No pleural effusions or pn
eumothorax.  Central and peripheral airways are patent and normal in caliber.  

 

Mediastinum:  Heart size is enlarged.  No pericardial effusion. Mild to moderate atherosclerotic calc
ifications in coronary vessels and thoracic aorta are seen. No mediastinal hematoma. No mediastinal o
r hilar adenopathy by size criteria.  Thoracic aorta and central pulmonary arteries are normal in siz
e.  Esophagus is normal in caliber. Moderate sized hiatal hernia.  

 

Bones and chest wall:  No suspicious bony lesions. Acute appearing compression deformity involving jordan
perior endplate of L1 is seen with up to 20% loss of L1 vertebral body height anteriorly. This is a n
ew finding since 1/30/2021 study. Degenerative endplate changes are noted throughout thoracic spine v
ertebral bodies. No axillary or supraclavicular adenopathy by size criteria.  The thyroid is normal i
n size and there are no incidental findings..

 

Abdomen:  Visualized upper abdominal solid organs appear normal.  Upper abdominal bowel loops are nor
mal in caliber.  

 

IMPRESSION:  

 

1. Acute appearing superior endplate compression deformity at L1 level with up to 20% loss of L1 vert
ebral body height. No significant retropulsion of the posterior wall is seen. No other compression fr
acture or spondylolisthesis.

2. No acute rib fracture is seen.

3. Dependent atelectasis in posterior aspect of bilateral lung fields. No pulmonary contusion or lace
ration. No pleural effusion or pneumothorax. Airway is patent.

4. Cardiomegaly, no pericardial effusion. No mediastinal hematoma or lymphadenopathy.

 

 

 

CLINICAL RECOMMENDATION STATEMENTS:

In patients <35 years with an ITN detected on CT, MRI, or extrathyroidal ultrasound, the Committee re
commends further evaluation with dedicated thyroid ultrasound if the nodule is "e1 cm and has no susp
icious imaging features, and if the patient has normal life expectancy.

In patients "e35 years with an ITN detected on CT, MRI, or extrathyroidal ultrasound, the Committee r
ecommends further evaluation with dedicated thyroid ultrasound if the nodule is "e1.5 cm and has no s
uspicious imaging features, and if the patient has normal life expectancy. (ACR, 2014)

 

Reviewed by: Monico Bermudez MD on 2/2/2023 9:22 AM PST

Approved by: Monico Bermudez MD on 2/2/2023 9:22 AM PST

 

 

Station ID:  [id number]

## 2023-02-02 NOTE — CT REPORT
PROCEDURE:  HEAD WO

 

INDICATIONS:  fall/dementia

 

TECHNIQUE:  

Noncontrast 4.5 mm thick angled axial sections acquired from the foramen magnum to the vertex.  For r
adiation dose reduction, the following was used:  automated exposure control, adjustment of mA and/or
 kV according to patient size.

 

COMPARISON:  None.

 

FINDINGS:  

Image quality: Diagnostic. Patient motion is noted.  

 

CSF spaces:  Basal cisterns are patent.  No extra-axial fluid collections.  Ventricles are normal in 
size and shape.  

 

Brain: Old infarction involving right frontal parietal lobe near vertex is seen with encephalomalacia
. There is moderate cerebral and cerebellar volume loss. Mild periventricular and deep white matter c
hronic ischemic small vessel changes are seen. No midline shift.  No intracranial masses or hemorrhag
e.  Gray-white matter interface is normal.  

 

Skull and face:  Calvarium and visualized facial bones are intact, without suspicious lesions.  

 

Sinuses: Mild mucosal thickening in bilateral ethmoid sinuses is seen.

 

IMPRESSION:  

1. No CT evidence of acute intracranial abnormalities.

2. Age-related volume loss and mild white matter chronic small vessel ischemic changes. Old infarctio
n in right frontoparietal lobe with encephalomalacia.

3. Mild bilateral ethmoid sinusitis.

 

Reviewed by: Monico Bermudez MD on 2/2/2023 9:08 AM PST

Approved by: Monico Bermudez MD on 2/2/2023 9:08 AM PST

 

 

Station ID:  [id number]

## 2023-02-03 LAB
ANION GAP SERPL CALCULATED.4IONS-SCNC: 10 MMOL/L (ref 6–13)
BASOPHILS NFR BLD AUTO: 0.1 10^3/UL (ref 0–0.1)
BASOPHILS NFR BLD AUTO: 0.4 %
BUN SERPL-MCNC: 15 MG/DL (ref 6–20)
CALCIUM UR-MCNC: 8.9 MG/DL (ref 8.5–10.3)
CHLORIDE SERPL-SCNC: 102 MMOL/L (ref 101–111)
CLARITY UR REFRACT.AUTO: (no result)
CO2 SERPL-SCNC: 26 MMOL/L (ref 21–32)
CREAT SERPLBLD-SCNC: 1 MG/DL (ref 0.6–1.2)
EOSINOPHIL # BLD AUTO: 0 10^3/UL (ref 0–0.7)
EOSINOPHIL NFR BLD AUTO: 0.3 %
ERYTHROCYTE [DISTWIDTH] IN BLOOD BY AUTOMATED COUNT: 13.4 % (ref 12–15)
GFRSERPLBLD MDRD-ARVRAT: 72 ML/MIN/{1.73_M2} (ref 89–?)
GLUCOSE SERPL-MCNC: 104 MG/DL (ref 70–100)
GLUCOSE UR QL STRIP.AUTO: NEGATIVE MG/DL
HCT VFR BLD AUTO: 44.7 % (ref 42–52)
HGB UR QL STRIP: 14.5 G/DL (ref 14–18)
KETONES UR QL STRIP.AUTO: (no result) MG/DL
LYMPHOCYTES # SPEC AUTO: 1.3 10^3/UL (ref 1.5–3.5)
LYMPHOCYTES NFR BLD AUTO: 11.1 %
MCH RBC QN AUTO: 32.1 PG (ref 27–31)
MCHC RBC AUTO-ENTMCNC: 32.4 G/DL (ref 32–36)
MCV RBC AUTO: 98.9 FL (ref 80–94)
MONOCYTES # BLD AUTO: 0.9 10^3/UL (ref 0–1)
MONOCYTES NFR BLD AUTO: 7.4 %
MUCOUS THREADS URNS QL MICRO: (no result)
NEUTROPHILS # BLD AUTO: 9.5 10^3/UL (ref 1.5–6.6)
NEUTROPHILS # SNV AUTO: 11.8 X10^3/UL (ref 4.8–10.8)
NEUTROPHILS NFR BLD AUTO: 80.5 %
NITRITE UR QL STRIP.AUTO: NEGATIVE
NRBC # BLD AUTO: 0 /100WBC
NRBC # BLD AUTO: 0 X10^3/UL
PDW BLD AUTO: 10.7 FL (ref 7.4–11.4)
PH UR STRIP.AUTO: 6 PH (ref 5–7.5)
PLATELET # BLD: 169 10^3/UL (ref 130–450)
POTASSIUM SERPL-SCNC: 4.2 MMOL/L (ref 3.5–5)
PROT UR STRIP.AUTO-MCNC: (no result) MG/DL
RBC # UR STRIP.AUTO: (no result) /UL
RBC # URNS HPF: (no result) /HPF (ref 0–5)
RBC MAR: 4.52 10^6/UL (ref 4.7–6.1)
SODIUM SERPLBLD-SCNC: 138 MMOL/L (ref 135–145)
SP GR UR STRIP.AUTO: 1.02 (ref 1–1.03)
SQUAMOUS URNS QL MICRO: (no result)
UROBILINOGEN UR QL STRIP.AUTO: (no result) E.U./DL
UROBILINOGEN UR STRIP.AUTO-MCNC: NEGATIVE MG/DL
WBC # UR MANUAL: (no result) /HPF (ref 0–3)

## 2023-02-03 RX ADMIN — ACETAMINOPHEN PRN MG: 500 TABLET ORAL at 02:14

## 2023-02-03 RX ADMIN — OXYCODONE PRN MG: 5 TABLET ORAL at 02:14

## 2023-02-03 RX ADMIN — OXYCODONE SCH MG: 5 TABLET ORAL at 21:09

## 2023-02-03 RX ADMIN — OXYCODONE SCH MG: 5 TABLET ORAL at 09:29

## 2023-02-03 RX ADMIN — OXYCODONE PRN MG: 5 TABLET ORAL at 15:06

## 2023-02-03 RX ADMIN — ACETAMINOPHEN SCH MG: 325 TABLET ORAL at 17:22

## 2023-02-03 RX ADMIN — ACETAMINOPHEN SCH MG: 325 TABLET ORAL at 21:08

## 2023-02-03 RX ADMIN — ACETAMINOPHEN SCH: 325 TABLET ORAL at 09:29

## 2023-02-03 RX ADMIN — OXYCODONE PRN MG: 5 TABLET ORAL at 09:27

## 2023-02-03 RX ADMIN — DONEPEZIL HYDROCHLORIDE SCH MG: 5 TABLET, FILM COATED ORAL at 21:09

## 2023-02-03 RX ADMIN — ACETAMINOPHEN SCH: 325 TABLET ORAL at 13:11

## 2023-02-03 RX ADMIN — PANTOPRAZOLE SODIUM SCH MG: 40 TABLET, DELAYED RELEASE ORAL at 06:22

## 2023-02-03 NOTE — ED PHYSICIAN DOCUMENTATION
ED Addendum





- Addendum


Addendum: 





02/03/23 13:56


The patient was reportedly very restless overnight and calling for assistance in

trying to get out of bed often.  I looked at his medication list and it actually

included Seroquel 75 mg and Aricept 5 mg at night which she had not had.  I 

think this likely accounts for some of his agitation.  He could also relate to 

back pain.  He was receiving pain medications as needed but question would be 

whether he was able to ask for them.





I changed order from oxycodone and Tylenol as needed every 6 hours to straight 

dosing of acetaminophen 650 mg 4 times daily and then oxycodone 5 mg twice daily

with continuing of the every 6 hours if needed interpose doses.  We will see if 

this is more effective for him.





[facility] is working [facility] getting [facility] to [facility] today or [facility].  Temporary 

orders were filled out for placement.  Social work will work on getting the rest

from his primary care.  I wrote a prescription for the oxycodone and also some 

nasal calcitonin which can be helpful in compression fracture.  I sent these to 

[facility] pharmacy in [place] which is listed as his preferred pharmacy.

## 2023-02-04 RX ADMIN — PANTOPRAZOLE SODIUM SCH MG: 40 TABLET, DELAYED RELEASE ORAL at 06:37

## 2023-02-04 RX ADMIN — ACETAMINOPHEN SCH MG: 325 TABLET ORAL at 13:50

## 2023-02-04 RX ADMIN — OXYCODONE PRN MG: 5 TABLET ORAL at 05:18

## 2023-02-04 RX ADMIN — ACETAMINOPHEN SCH MG: 325 TABLET ORAL at 20:28

## 2023-02-04 RX ADMIN — ACETAMINOPHEN SCH MG: 325 TABLET ORAL at 08:57

## 2023-02-04 RX ADMIN — OXYCODONE SCH MG: 5 TABLET ORAL at 08:57

## 2023-02-04 RX ADMIN — ACETAMINOPHEN SCH MG: 325 TABLET ORAL at 17:02

## 2023-02-04 RX ADMIN — DABIGATRAN ETEXILATE MESYLATE SCH: 75 CAPSULE ORAL at 21:02

## 2023-02-04 RX ADMIN — OXYCODONE PRN MG: 5 TABLET ORAL at 15:05

## 2023-02-04 RX ADMIN — OXYCODONE SCH MG: 5 TABLET ORAL at 20:28

## 2023-02-04 RX ADMIN — DONEPEZIL HYDROCHLORIDE SCH MG: 5 TABLET, FILM COATED ORAL at 20:28

## 2023-02-04 NOTE — ED PHYSICIAN DOCUMENTATION
ED Addendum





- Addendum


Addendum: 





02/04/23 17:39


Nursing staff has requested and as needed for anxiety and agitation. The patient

has intermittently been requiring in and out catheterizations and nursing staff 

just did a bladder scan which showed more than 500 of urine.  They attempted to 

get the patient to urinate but he was unable thus and in and out was completed. 

They report his agitation has improved.





Nonetheless I have written for a twice daily as needed Seroquel of 25 mg.  I 

have asked nursing staff to make sure that they are doing a bladder scan to 

assess for urinary obstruction as a cause of agitation before medicating him.

## 2023-02-04 NOTE — ED PHYSICIAN DOCUMENTATION
ED Addendum





- Addendum


Addendum: 





02/04/23 09:54


The patient reportedly rested with some restlessness overnight.  He had received

his p.m. medications and that seemed to do better last night than the night 

before when he had not had them.





He was not remembering directions and explanations well this morning similar to 

expected for dementia. He is awake and conversant otherwise.  He was having some

small urine outputs.  He is typically on tamsulosin and finasteride and had not 

had those.  I reordered the tamsulosin.  We do not have finasteride and stock 

here.  Hopefully this will improve his urine output.  He was having some urinary

retention and a bladder scanner had 400 mL during the night and did seem a littl

e bit restless at the time.  He had a in and out catheterization and seem to be 

less restless.





We will check bladder scanner periodically and I have ordered it for every 4-6 

hours.  Hopefully he will be urinating and enough to not have too large of a 

residual as he was more bothered by the [place] catheter yesterday.





The expectation is for the patient to go to [facility] today.  Social work should 

be in a while and help facilitate that but that was the plan yesterday.

## 2023-02-05 RX ADMIN — ACETAMINOPHEN SCH MG: 325 TABLET ORAL at 15:11

## 2023-02-05 RX ADMIN — APIXABAN SCH MG: 5 TABLET, FILM COATED ORAL at 09:38

## 2023-02-05 RX ADMIN — OXYCODONE PRN MG: 5 TABLET ORAL at 15:12

## 2023-02-05 RX ADMIN — OXYCODONE SCH MG: 5 TABLET ORAL at 20:49

## 2023-02-05 RX ADMIN — APIXABAN SCH MG: 5 TABLET, FILM COATED ORAL at 20:49

## 2023-02-05 RX ADMIN — DONEPEZIL HYDROCHLORIDE SCH MG: 5 TABLET, FILM COATED ORAL at 20:49

## 2023-02-05 RX ADMIN — ACETAMINOPHEN SCH MG: 325 TABLET ORAL at 20:49

## 2023-02-05 RX ADMIN — ACETAMINOPHEN SCH: 325 TABLET ORAL at 17:25

## 2023-02-05 RX ADMIN — ACETAMINOPHEN SCH MG: 325 TABLET ORAL at 09:25

## 2023-02-05 RX ADMIN — OXYCODONE SCH MG: 5 TABLET ORAL at 09:25

## 2023-02-05 RX ADMIN — DABIGATRAN ETEXILATE MESYLATE SCH: 75 CAPSULE ORAL at 09:38

## 2023-02-05 RX ADMIN — PANTOPRAZOLE SODIUM SCH MG: 40 TABLET, DELAYED RELEASE ORAL at 09:24

## 2023-02-06 RX ADMIN — OXYCODONE SCH MG: 5 TABLET ORAL at 20:44

## 2023-02-06 RX ADMIN — OXYCODONE SCH MG: 5 TABLET ORAL at 09:20

## 2023-02-06 RX ADMIN — ACETAMINOPHEN SCH MG: 325 TABLET ORAL at 14:24

## 2023-02-06 RX ADMIN — ACETAMINOPHEN SCH MG: 325 TABLET ORAL at 20:45

## 2023-02-06 RX ADMIN — APIXABAN SCH MG: 5 TABLET, FILM COATED ORAL at 09:20

## 2023-02-06 RX ADMIN — APIXABAN SCH MG: 5 TABLET, FILM COATED ORAL at 20:43

## 2023-02-06 RX ADMIN — DONEPEZIL HYDROCHLORIDE SCH MG: 5 TABLET, FILM COATED ORAL at 20:44

## 2023-02-06 RX ADMIN — ACETAMINOPHEN SCH MG: 325 TABLET ORAL at 09:20

## 2023-02-06 RX ADMIN — ACETAMINOPHEN SCH MG: 325 TABLET ORAL at 17:53

## 2023-02-06 RX ADMIN — PANTOPRAZOLE SODIUM SCH MG: 40 TABLET, DELAYED RELEASE ORAL at 06:59

## 2023-02-06 NOTE — ED PHYSICIAN DOCUMENTATION
ED Addendum





- Addendum


Addendum: 





02/06/23 17:18





78-year-old male continues to board in the emergency department.  In brief this 

gentleman does have a history of dementia.  He has been boarding here now for a 

number of days as his wife does not feel that she can continue to care for him 

at home.  She is hoping that he will be able to go to a skilled nursing facility

though social work has explained to the patient that skilled nursing facility 

may not be appropriate if there is no diagnosis from which we would expect him 

to recover.  He does have a known L1 fracture.





Over the last 24 hours vital signs have been reviewed.  He does have some modest

hypertension though this is in the setting of a known history of hypertension.  

He appears to be eating his diet.  Social work continues to work with [employer] and

insurance for authorization for further evaluation and placement which may 

include respite care versus skilled nursing facility.

## 2023-02-07 ENCOUNTER — HOSPITAL ENCOUNTER (OUTPATIENT)
Dept: HOSPITAL 76 - EMS | Age: 78
Discharge: NURSING, REHAB OR LONG-TERM CARE | End: 2023-02-07
Attending: EMERGENCY MEDICINE
Payer: MEDICARE

## 2023-02-07 VITALS — DIASTOLIC BLOOD PRESSURE: 75 MMHG | SYSTOLIC BLOOD PRESSURE: 140 MMHG

## 2023-02-07 DIAGNOSIS — R53.1: Primary | ICD-10-CM

## 2023-02-07 DIAGNOSIS — R41.0: ICD-10-CM

## 2023-02-07 DIAGNOSIS — R29.6: ICD-10-CM

## 2023-02-07 DIAGNOSIS — M48.56XA: ICD-10-CM

## 2023-02-07 RX ADMIN — ACETAMINOPHEN SCH MG: 325 TABLET ORAL at 13:56

## 2023-02-07 RX ADMIN — ACETAMINOPHEN SCH MG: 325 TABLET ORAL at 09:19

## 2023-02-07 RX ADMIN — APIXABAN SCH MG: 5 TABLET, FILM COATED ORAL at 09:20

## 2023-02-07 RX ADMIN — OXYCODONE SCH MG: 5 TABLET ORAL at 09:20

## 2023-02-07 RX ADMIN — PANTOPRAZOLE SODIUM SCH MG: 40 TABLET, DELAYED RELEASE ORAL at 06:34

## 2023-02-07 NOTE — ED PHYSICIAN DOCUMENTATION
ED Addendum





- Addendum


Addendum: 


Patient has been boarding in the emergency department overnight.  He has a 

nonoperative compression fracture in his back.





Pt has been Accepted to [facility].  He has been resting comfortably 

This morning without any complaints.








Departure





- Departure


Disposition: 01 Home, Self Care


Clinical Impression: 


 Compression fracture of L1 lumbar vertebra, Dementia





Condition: Stable


Instructions:  ED Fx Comp Vertebral


Follow-Up: 


[facility] [Provider Group]


Prescriptions: 


Calcitonin [Fortical] 1 sprays KARL DAILY #1 each


oxyCODONE [Roxicodone] 5 mg PO Q6H PRN #20 tablet


 PRN Reason: Pain

## 2023-04-10 ENCOUNTER — HOSPITAL ENCOUNTER (OUTPATIENT)
Dept: HOSPITAL 76 - LAB.S | Age: 78
Discharge: HOME | End: 2023-04-10
Attending: PHYSICIAN ASSISTANT
Payer: MEDICARE

## 2023-04-10 DIAGNOSIS — R30.0: Primary | ICD-10-CM

## 2023-04-10 PROCEDURE — 87086 URINE CULTURE/COLONY COUNT: CPT

## 2023-05-19 ENCOUNTER — HOSPITAL ENCOUNTER (OUTPATIENT)
Dept: HOSPITAL 76 - LAB.S | Age: 78
Discharge: HOME | End: 2023-05-19
Attending: UROLOGY
Payer: MEDICARE

## 2023-05-19 DIAGNOSIS — N39.0: Primary | ICD-10-CM

## 2023-05-19 PROCEDURE — 87086 URINE CULTURE/COLONY COUNT: CPT

## 2023-05-19 PROCEDURE — 87077 CULTURE AEROBIC IDENTIFY: CPT

## 2023-05-19 PROCEDURE — 87181 SC STD AGAR DILUTION PER AGT: CPT

## 2023-05-29 ENCOUNTER — HOSPITAL ENCOUNTER (EMERGENCY)
Dept: HOSPITAL 76 - ED | Age: 78
Discharge: HOME | End: 2023-05-29
Payer: MEDICARE

## 2023-05-29 VITALS — SYSTOLIC BLOOD PRESSURE: 108 MMHG | DIASTOLIC BLOOD PRESSURE: 73 MMHG

## 2023-05-29 DIAGNOSIS — R79.1: ICD-10-CM

## 2023-05-29 DIAGNOSIS — N48.1: ICD-10-CM

## 2023-05-29 DIAGNOSIS — N30.00: Primary | ICD-10-CM

## 2023-05-29 LAB
ALBUMIN DIAFP-MCNC: 3.4 G/DL (ref 3.2–5.5)
ALBUMIN/GLOB SERPL: 1.1 {RATIO} (ref 1–2.2)
ALP SERPL-CCNC: 55 IU/L (ref 42–121)
ALT SERPL W P-5'-P-CCNC: < 10 IU/L (ref 10–60)
ANION GAP SERPL CALCULATED.4IONS-SCNC: 2 MMOL/L (ref 6–13)
AST SERPL W P-5'-P-CCNC: 14 IU/L (ref 10–42)
BASOPHILS NFR BLD AUTO: 0.1 10^3/UL (ref 0–0.1)
BASOPHILS NFR BLD AUTO: 0.5 %
BILIRUB BLD-MCNC: 0.7 MG/DL (ref 0.2–1)
BILIRUB UR QL CFM: NEGATIVE
BUN SERPL-MCNC: 14 MG/DL (ref 6–20)
CALCIUM UR-MCNC: 8.5 MG/DL (ref 8.5–10.3)
CHLORIDE SERPL-SCNC: 111 MMOL/L (ref 101–111)
CLARITY UR REFRACT.AUTO: (no result)
CO2 SERPL-SCNC: 28 MMOL/L (ref 21–32)
CREAT SERPLBLD-SCNC: 0.9 MG/DL (ref 0.6–1.2)
EOSINOPHIL # BLD AUTO: 0.1 10^3/UL (ref 0–0.7)
EOSINOPHIL NFR BLD AUTO: 1.3 %
ERYTHROCYTE [DISTWIDTH] IN BLOOD BY AUTOMATED COUNT: 14.1 % (ref 12–15)
GFRSERPLBLD MDRD-ARVRAT: 81 ML/MIN/{1.73_M2} (ref 89–?)
GLOBULIN SER-MCNC: 3.2 G/DL (ref 2.1–4.2)
GLUCOSE SERPL-MCNC: 110 MG/DL (ref 70–100)
GLUCOSE UR QL STRIP.AUTO: NEGATIVE MG/DL
HCT VFR BLD AUTO: 39.5 % (ref 42–52)
HGB UR QL STRIP: 12.4 G/DL (ref 14–18)
INR PPP: 1.3 (ref 0.8–1.2)
KETONES UR QL STRIP.AUTO: (no result) MG/DL
LIPASE SERPL-CCNC: 35 U/L (ref 22–51)
LYMPHOCYTES # SPEC AUTO: 1.4 10^3/UL (ref 1.5–3.5)
LYMPHOCYTES NFR BLD AUTO: 12.3 %
MCH RBC QN AUTO: 32.4 PG (ref 27–31)
MCHC RBC AUTO-ENTMCNC: 31.4 G/DL (ref 32–36)
MCV RBC AUTO: 103.1 FL (ref 80–94)
MONOCYTES # BLD AUTO: 1.5 10^3/UL (ref 0–1)
MONOCYTES NFR BLD AUTO: 13.2 %
NEUTROPHILS # BLD AUTO: 8.1 10^3/UL (ref 1.5–6.6)
NEUTROPHILS # SNV AUTO: 11.2 X10^3/UL (ref 4.8–10.8)
NEUTROPHILS NFR BLD AUTO: 72.3 %
NITRITE UR QL STRIP.AUTO: POSITIVE
NRBC # BLD AUTO: 0 /100WBC
NRBC # BLD AUTO: 0 X10^3/UL
PDW BLD AUTO: 10.6 FL (ref 7.4–11.4)
PH UR STRIP.AUTO: 6.5 PH (ref 5–7.5)
PLATELET # BLD: 295 10^3/UL (ref 130–450)
POTASSIUM SERPL-SCNC: 3.8 MMOL/L (ref 3.5–5)
PROT SPEC-MCNC: 6.6 G/DL (ref 6.7–8.2)
PROT UR STRIP.AUTO-MCNC: 100 MG/DL
PROTHROM ACT/NOR PPP: 14.8 SECS (ref 9.9–12.6)
RBC # UR STRIP.AUTO: (no result) /UL
RBC # URNS HPF: (no result) /HPF (ref 0–5)
RBC MAR: 3.83 10^6/UL (ref 4.7–6.1)
SODIUM SERPLBLD-SCNC: 141 MMOL/L (ref 135–145)
SP GR UR STRIP.AUTO: >=1.03 (ref 1–1.03)
SQUAMOUS URNS QL MICRO: (no result)
UROBILINOGEN UR QL STRIP.AUTO: (no result) E.U./DL
UROBILINOGEN UR STRIP.AUTO-MCNC: NEGATIVE MG/DL
WBC # UR MANUAL: >25 /HPF (ref 0–3)
YEAST URNS QL MICRO: PRESENT

## 2023-05-29 PROCEDURE — 96374 THER/PROPH/DIAG INJ IV PUSH: CPT

## 2023-05-29 PROCEDURE — 85610 PROTHROMBIN TIME: CPT

## 2023-05-29 PROCEDURE — 74177 CT ABD & PELVIS W/CONTRAST: CPT

## 2023-05-29 PROCEDURE — 71045 X-RAY EXAM CHEST 1 VIEW: CPT

## 2023-05-29 PROCEDURE — 81003 URINALYSIS AUTO W/O SCOPE: CPT

## 2023-05-29 PROCEDURE — 83690 ASSAY OF LIPASE: CPT

## 2023-05-29 PROCEDURE — 85025 COMPLETE CBC W/AUTO DIFF WBC: CPT

## 2023-05-29 PROCEDURE — 87040 BLOOD CULTURE FOR BACTERIA: CPT

## 2023-05-29 PROCEDURE — 87150 DNA/RNA AMPLIFIED PROBE: CPT

## 2023-05-29 PROCEDURE — 36415 COLL VENOUS BLD VENIPUNCTURE: CPT

## 2023-05-29 PROCEDURE — 83605 ASSAY OF LACTIC ACID: CPT

## 2023-05-29 PROCEDURE — 70450 CT HEAD/BRAIN W/O DYE: CPT

## 2023-05-29 PROCEDURE — 99284 EMERGENCY DEPT VISIT MOD MDM: CPT

## 2023-05-29 PROCEDURE — 80053 COMPREHEN METABOLIC PANEL: CPT

## 2023-05-29 PROCEDURE — 87077 CULTURE AEROBIC IDENTIFY: CPT

## 2023-05-29 PROCEDURE — 51701 INSERT BLADDER CATHETER: CPT

## 2023-05-29 PROCEDURE — 87181 SC STD AGAR DILUTION PER AGT: CPT

## 2023-05-29 PROCEDURE — 87086 URINE CULTURE/COLONY COUNT: CPT

## 2023-05-29 PROCEDURE — 81001 URINALYSIS AUTO W/SCOPE: CPT

## 2023-05-29 NOTE — EXTERNAL MEDICAL SUMMARY RPT
Continuity of Care Document



                            Created on: May 29, 2023





BAIRON ALVAREZ

External Reference #: [id number]

: 1945

Sex: Male



Demographics





                                        Address             [address] [place]  [address]

 

                                        Phone               Unavailable

 

                                        Preferred Language  English

 

                                        Marital Status      

 

                                        Yarsanism Affiliation Unknown

 

                                        Race                Unknown

 

                                        Ethnic Group        Not  or Lati

no





Author





                                        Name                Unknown

 

                                        [place]  [address]

 

                                        Phone               [phone]

 

                                        Organization        [facility]

 

                                        [place]  [address]

 

                                        Phone               [phone]





Care Team Providers





                                Care Team Member Name Role            Phone

 

                                Unavailable     Unavailable     Unavailable

 

                                Trevor Patient Registrar, Risa Unavailable 

    Unavailable

 

                                Brady Fuller Pa-C Unavailable     Unavailable







Medications





                                date            description     facility

 

                                2023-04-10 00:00 cephalexin      [facility]

 

                                2023-04-10 00:00 cephalexin      [facility]

 

                                2023 00:00 cephalexin      [facility]

 

                                2023 00:00 cephalexin      [facility]

 

                                2023-04-10 00:00 ampicillin      [facility]

 

                                2023-04-10 00:00 ampicillin      [facility]

 

                                2023-04-10 00:00 cephalexin      [facility]

 

                                2023-04-10 00:00 cephalexin      [facility]

 

                                2023 00:00 cephalexin      [facility]

 

                                2023 00:00 cephalexin      [facility]

 

                                2023-04-10 00:00 ciprofloxacin hcl [facility]

 

                                2023-04-10 00:00 ampicillin      [facility]

 

                                2023-04-10 00:00 sulfamethoxazole-trimethoprim W

[facility]

 

                                2023 00:00 sulfamethoxazole-trimethoprim W

[facility]

 

                                2023 00:00 sulfamethoxazole-trimethoprim W

[facility]

 

                                2023-04-10 00:00 ciprofloxacin hcl [facility]

 

                                2023-04-10 00:00 sulfamethoxazole-trimethoprim W

[facility]

 

                                2023 00:00 sulfamethoxazole-trimethoprim W

[facility]

 

                                2023 00:00 sulfamethoxazole-trimethoprim W

[facility]

 

                                2023-04-10 00:00 sulfamethoxazole-trimethoprim W

[facility]

 

                                2023 00:00 sulfamethoxazole-trimethoprim W

[facility]

 

                                2023 00:00 sulfamethoxazole-trimethoprim W

[facility]

 

                                2023-04-10 00:00 cephalexin      [facility]

 

                                2023-04-10 00:00 cephalexin      [facility]

 

                                2023 00:00 cephalexin      [facility]

 

                                2023 00:00 cephalexin      [facility]

 

                                2023-04-10 00:00 ampicillin      [facility]

 

                                2023-04-10 00:00 cephalexin      [facility]

 

                                2023-04-10 00:00 cephalexin      [facility]

 

                                2023 00:00 cephalexin      [facility]

 

                                2023 00:00 cephalexin      [facility]

 

                                2023-04-10 00:00 sulfamethoxazole-trimethoprim W

[facility]

 

                                2023 00:00 sulfamethoxazole-trimethoprim W

[facility]

 

                                2023 00:00 sulfamethoxazole-trimethoprim W

[facility]

 

                                2023-04-10 00:00 ciprofloxacin hcl [facility]

[facility]

 

                                2023-04-10 00:00 ciprofloxacin hcl [facility]

[facility]







Problems





                                date            description     facility

 

                                2023-04-10 00:00 Blood in urine  [facility] 

[facility]

 

                                2023-04-10 00:00 Dysuria         [facility]

 

                                2023-04-10 00:00 Hematuria, unspecified [facility] 

[facility] [place]







Procedures





                                date            description     facility

 

                                2023-04-10 00:00 Visit Code Hold [facility] 

[facility] 

[place]

 

                                2023-04-10 00:00 POC URINALYSIS DIP [facility]

[facility] 

[place]







Results/Labs





                test    date    author  facility value   unit    interpretation

 

                                                    Result panel 1 

 

                          (unknown)    (no date)    (unknown)    [facility]          (no value)                (units 

unknown)                                (unknown)

 

                                                    Result panel 2 

 

                          (unknown)    (no date)    (unknown)    [facility] 

[facility] [place]          (no value)                (units 

unknown)                                (unknown)

 

                                                    Result panel 3 

 

                          (unknown)    (no date)    (unknown)    [facility]          (no value)                (units 

unknown)                                (unknown)

 

                                                    Result panel 4 

 

                          (unknown)    (no date)    (unknown)    [facility]          (no value)                (units 

unknown)                                (unknown)

 

                                                    Result panel 5 

 

                          (unknown)    (no date)    (unknown)    [facility]          (no value)                (units 

unknown)                                (unknown)

 

                                                    Result panel 6 

 

                          (unknown)    (no date)    (unknown)    [facility] 

[facility] [place]          (no value)                (units 

unknown)                                (unknown)

 

                                                    Result panel 7 

 

                          (unknown)    (no date)    (unknown)    [facility]          (no value)                (units 

unknown)                                (unknown)

 

                                                    Result panel 8 

 

                          (unknown)    (no date)    (unknown)    [facility]          (no value)                (units 

unknown)                                (unknown)

 

                                                    Result panel 9 

 

                          (unknown)    (no date)    (unknown)    [facility]          (no value)                (units 

unknown)                                (unknown)

 

                                                    Result panel 10 

 

                          (unknown)    (no date)    (unknown)    [facility]          (no value)                (units 

unknown)                                (unknown)

 

                                                    Result panel 11 

 

                          (unknown)    (no date)    (unknown)    [facility]          (no value)                (units 

unknown)                                (unknown)

 

                                                    Result panel 12 

 

                          (unknown)    (no date)    (unknown)    [facility]          (no value)                (units 

unknown)                                (unknown)

 

                                                    Result panel 13 

 

                          (unknown)    (no date)    (unknown)    [facility]          (no value)                (units 

unknown)                                (unknown)

 

                                                    Result panel 14 

 

                          (unknown)    (no date)    (unknown)    [facility]          (no value)                (units 

unknown)                                (unknown)

 

                                                    Result panel 15 

 

                          (unknown)    (no date)    (unknown)    [facility]          (no value)                (units 

unknown)                                (unknown)

 

                                                    Result panel 16 

 

                          (unknown)    (no date)    (unknown)    [facility]          (no value)                (units 

unknown)                                (unknown)

 

                                                    Result panel 17 

 

                          (unknown)    (no date)    (unknown)    [facility]          (no value)                (units 

unknown)                                (unknown)

 

                                                    Result panel 18 

 

                          (unknown)    (no date)    (unknown)    [facility]          (no value)                (units 

unknown)                                (unknown)

 

                                                    Result panel 19 

 

                          (unknown)    (no date)    (unknown)    [facility]          (no value)                (units 

unknown)                                (unknown)

 

                                                    Result panel 20 

 

                          (unknown)    (no date)    (unknown)    [facility]          (no value)                (units 

unknown)                                (unknown)

 

                                                    Result panel 21 

 

                          (unknown)    (no date)    (unknown)    [facility]          (no value)                (units 

unknown)                                (unknown)

 

                                                    Result panel 22 

 

                          (unknown)    (no date)    (unknown)    [facility]          (no value)                (units 

unknown)                                (unknown)

 

                                                    Result panel 23 

 

                          (unknown)    (no date)    (unknown)    [facility]          (no value)                (units 

unknown)                                (unknown)

 

                                                    Result panel 24 

 

                          (unknown)    (no date)    (unknown)    [facility]          (no value)                (units 

unknown)                                (unknown)

 

                                                    Result panel 25 

 

                          (unknown)    (no date)    (unknown)    [facility]          (no value)                (units 

unknown)                                (unknown)

 

                                                    Result panel 26 

 

                          (unknown)    (no date)    (unknown)    [facility]          (no value)                (units 

unknown)                                (unknown)

 

                                                    Result panel 27 

 

                          (unknown)    (no date)    (unknown)    [facility]          (no value)                (units 

unknown)                                (unknown)

 

                                                    Result panel 28 

 

                          (unknown)    (no date)    (unknown)    [facility]          (no value)                (units 

unknown)                                (unknown)

 

                                                    Result panel 29 

 

                          (unknown)    (no date)    (unknown)    [facility]          (no value)                (units 

unknown)                                (unknown)

 

                                                    Result panel 30 

 

                          (unknown)    (no date)    (unknown)    [facility]          (no value)                (units 

unknown)                                (unknown)

 

                                                    Result panel 31 

 

                          (unknown)    (no date)    (unknown)    [facility]          (no value)                (units 

unknown)                                (unknown)

 

                                                    Result panel 32 

 

                          (unknown)    (no date)    (unknown)    [facility]          (no value)                (units 

unknown)                                (unknown)

 

                                                    Result panel 33 

 

                          (unknown)    (no date)    (unknown)    [facility]          (no value)                (units 

unknown)                                (unknown)

 

                                                    Result panel 34 

 

                          (unknown)    (no date)    (unknown)    [facility]          (no value)                (units 

unknown)                                (unknown)

 

                                                    Result panel 35 

 

                          (unknown)    (no date)    (unknown)    [facility]          (no value)                (units 

unknown)                                (unknown)

 

                                                    Result panel 36 

 

                          (unknown)    (no date)    (unknown)    [facility]          (no value)                (units 

unknown)                                (unknown)

 

                                                    Result panel 37 

 

                          (unknown)    (no date)    (unknown)    [facility]          (no value)                (units 

unknown)                                (unknown)

 

                                                    Result panel 38 

 

                          (unknown)    (no date)    (unknown)    [facility]          (no value)                (units 

unknown)                                (unknown)

 

                                                    Result panel 39 

 

                          (unknown)    (no date)    (unknown)    [facility]          (no value)                (units 

unknown)                                (unknown)

 

                                                    Result panel 40 

 

                          (unknown)    (no date)    (unknown)    [facility]          (no value)                (units 

unknown)                                (unknown)

 

                                                    Result panel 41 

 

                          (unknown)    (no date)    (unknown)    [facility]          (no value)                (units 

unknown)                                (unknown)

 

                                                    Result panel 42 

 

                          (unknown)    (no date)    (unknown)    [facility]          (no value)                (units 

unknown)                                (unknown)

 

                                                    Result panel 43 

 

                          (unknown)    (no date)    (unknown)    [facility]          (no value)                (units 

unknown)                                (unknown)

 

                                                    Result panel 44 

 

                          (unknown)    (no date)    (unknown)    [facility]          (no value)                (units 

unknown)                                (unknown)

 

                                                    Result panel 45 

 

                          (unknown)    (no date)    (unknown)    [facility]          (no value)                (units 

unknown)                                (unknown)

 

                                                    Result panel 46 

 

                          (unknown)    (no date)    (unknown)    [facility]          (no value)                (units 

unknown)                                (unknown)

 

                                                    Result panel 47 

 

                          (unknown)    (no date)    (unknown)    [facility]          (no value)                (units 

unknown)                                (unknown)

 

                                                    Result panel 48 

 

                          (unknown)    (no date)    (unknown)    [facility]          (no value)                (units 

unknown)                                (unknown)

 

                                                    Result panel 49 

 

                          (unknown)    (no date)    (unknown)    [facility]          (no value)                (units 

unknown)                                (unknown)

 

                                                    Result panel 50 

 

                          (unknown)    (no date)    (unknown)    [facility]          (no value)                (units 

unknown)                                (unknown)

 

                                                    Result panel 51 

 

                          (unknown)    (no date)    (unknown)    [facility]          (no value)                (units 

unknown)                                (unknown)

 

                                                    Result panel 52 

 

                          (unknown)    (no date)    (unknown)    [facility]          (no value)                (units 

unknown)                                (unknown)

 

                                                    Result panel 53 

 

                          (unknown)    (no date)    (unknown)    [facility]          (no value)                (units 

unknown)                                (unknown)

 

                                                    Result panel 54 

 

                          (unknown)    (no date)    (unknown)    [facility]          (no value)                (units 

unknown)                                (unknown)

 

                                                    Result panel 55 

 

                          (unknown)    (no date)    (unknown)    [facility] 

[facility]          (no value)                (units 

unknown)                                (unknown)

 

                                                    Result panel 56 

 

                          (unknown)    (no date)    (unknown)    [facility] 

[facility]          (no value)                (units 

unknown)                                (unknown)

 

                                                    Result panel 57 

 

                          (unknown)    (no date)    (unknown)    [facility] 

[facility]          (no value)                (units 

unknown)                                (unknown)

 

                                                    Result panel 58 

 

                          (unknown)    (no date)    (unknown)    [facility] 

[facility]          (no value)                (units 

unknown)                                (unknown)

 

                                                    Result panel 59 

 

                          (unknown)    (no date)    (unknown)    [facility] 

[facility]          (no value)                (units 

unknown)                                (unknown)

 

                                                    Result panel 60 

 

                          (unknown)    (no date)    (unknown)    [facility] 

[facility]          (no value)                (units 

unknown)                                (unknown)

 

                                                    Result panel 61 

 

                          (unknown)    (no date)    (unknown)    [facility] 

[facility]          (no value)                (units 

unknown)                                (unknown)

 

                                                    Result panel 62 

 

                          (unknown)    (no date)    (unknown)    [facility] 

[facility] 

[facility]          (no value)                (units 

unknown)                                (unknown)

 

                                                    Result panel 63 

 

                          (unknown)    (no date)    (unknown)    [facility] 

[facility]          (no value)                (units 

unknown)                                (unknown)

 

                                                    Result panel 64 

 

                          (unknown)    (no date)    (unknown)    [facility] 

[facility]          (no value)                (units 

unknown)                                (unknown)

 

                                                    Result panel 65 

 

                          (unknown)    (no date)    (unknown)    [facility] 

[facility]          (no value)                (units 

unknown)                                (unknown)

 

                                                    Result panel 66 

 

                          (unknown)    (no date)    (unknown)    [facility] 

[facility]          (no value)                (units 

unknown)                                (unknown)

 

                                                    Result panel 67 

 

                          (unknown)    (no date)    (unknown)    [facility] 

[facility]          (no value)                (units 

unknown)                                (unknown)

 

                                                    Result panel 68 

 

                          (unknown)    (no date)    (unknown)    [facility] 

[facility]          (no value)                (units 

unknown)                                (unknown)

 

                                                    Result panel 69 

 

                          (unknown)    (no date)    (unknown)    [facility] 

[facility]          (no value)                (units 

unknown)                                (unknown)

 

                                                    Result panel 70 

 

                          (unknown)    (no date)    (unknown)    [facility] 

[facility]          (no value)                (units 

unknown)                                (unknown)

 

                                                    Result panel 71 

 

                          (unknown)    (no date)    (unknown)    [facility] 

[facility]          (no value)                (units 

unknown)                                (unknown)

 

                                                    Result panel 72 

 

                          (unknown)    (no date)    (unknown)    [facility] 

[facility]          (no value)                (units 

unknown)                                (unknown)







Social History





                                date            description     facility

 

                                2023-04-10 00:00 Former smoker   [facility] 

[facility] 

[place]







Vital Signs





                          date         measurement  value        units

 

                          2023-04-10 00:00 BMI          25.87        kg/m2

 

                          2023-04-10 00:00 BP_diastolic 68           mmHg

 

                          2023-04-10 00:00 BP_systolic  119          mmHg

 

                          2023-04-10 00:00 heart_rate   63           /min

 

                          2023-04-10 00:00 height_metric 175.9        cm

 

                          2023-04-10 00:00 height_standard 69.25        in

 

                          2023-04-10 00:00 respiration_rate 16           /min

 

                          2023-04-10 00:00 temperature_metric 36.61        C

 

                          2023-04-10 00:00 temperature_standard 97.9         F

 

                          2023-04-10 00:00 weight_metric 79.74        kg

 

                          2023-04-10 00:00 weight_standard 175.8        lb

## 2023-05-29 NOTE — ED PHYSICIAN DOCUMENTATION
History of Present Illness





- Stated complaint


Stated Complaint: LT ARM PX/UNABLE TO WALK/ WEAKNESS





- Chief complaint


Chief Complaint: Neuro





- History obtained from


History obtained from: Patient, Family





- History of Present Illness


Pain level max: 0


Pain level now: 0





- Additonal information


Additional information: 





78-year-old male with a history of dementia, brought in by his wife today.  She 

has noticed over the past 2 to 3 days that he has been weaker than usual.  Has a

history of chronic recurrent UTIs and has been on multiple antibiotics.  

Currently is finishing ciprofloxacin.  Noted hematuria starting yesterday.  No 

fevers.  No chills.  Has a history of recurrent urosepsis.  Sees urology at 

[facility] in [place].  She states last night he did complain of left arm pain. 

Today she noticed him weaker than yesterday.  He kept picking up his walker like

he did not know how to use it.  Seemed more confused than usual.  Did not have 

any focal neurological deficits.  He would  his walker and turned to the 

left.  No difficulty with speech or swallowing.  His caregiver told the wife 

that she had more trouble bathing him today than usual as well.  Wife also is 

concerned that the glans of the penis is red and swollen.  No abdominal pain.  

No vomiting.  History is limited secondary to dementia.





Review of Systems


Unable to obtain: Dementia





PD PAST MEDICAL HISTORY





- Past Medical History


Cardiovascular: Hypertension, High cholesterol, Atrial fibrillation


Respiratory: None


Neuro: Dementia, CVA


Endocrine/Autoimmune: None


GI: None


: Benign prostate hypertrophy, Retention, Incontinence, Indwelling catheter


HEENT: None


Psych: Anxiety, Other


Musculoskeletal: Chronic back pain


Derm: None





- Past Surgical History


Past Surgical History: Yes


General: Splenectomy, Other


Ortho: Hip replacement, Knee replacement


HEENT: Tonsil/Adenoidectomy, Other





- Present Medications


Home Medications: 


                                Ambulatory Orders











 Medication  Instructions  Recorded  Confirmed


 


Finasteride [Proscar] 5 mg PO DAILY  tablet 09/15/20 02/04/23


 


Tamsulosin [Flomax] 0.4 mg PO DAILY #30 capsule 09/15/20 02/04/23


 


Walker [Ultra-Light Rollator] 1 each MC DAILY #1 each 09/15/20 01/30/21


 


QUEtiapine [SEROquel] 75 mg PO DAILY PM 01/30/21 02/03/23


 


Dabigatran Etexilate Mesylate 150 mg PO BID 01/31/21 02/04/23





[Pradaxa]   


 


Lidocaine Patch 5% [Lidoderm Patch] 1 patch TOP DAILY PRN #10 patch 02/02/23 02/03/23


 


Oxycodone HCl/Acetaminophen 1 each PO Q6H PRN #14 tablet 02/02/23 02/03/23





[Percocet 5-325 mg Tablet]   


 


Calcitonin [Fortical] 1 sprays KARL DAILY #1 each 02/03/23 


 


oxyCODONE [Roxicodone] 5 mg PO Q6H PRN #20 tablet 02/03/23 


 


Cefpodoxime Proxetil [Vantin] 100 mg PO Q12H #14 tablet 05/29/23 


 


Mupirocin 2% Oint [Bactroban 2% 1 applic TOP BID #22 gm 05/29/23 





Oint]   














- Allergies


Allergies/Adverse Reactions: 


                                    Allergies











Allergy/AdvReac Type Severity Reaction Status Date / Time


 


No Known Drug Allergies Allergy   Verified 05/29/23 13:37














- Social History


Does the pt smoke?: No


Smoking Status: Never smoker


Does the pt drink ETOH?: Yes


Does the pt have substance abuse?: No





- Immunizations


Immunizations are current?: Yes





- POLST


Patient has POLST: No


POLST Status: Full Code





PD ED PE NORMAL





- Vitals


Vital signs reviewed: Yes





- General


General: No acute distress, Well developed/nourished, Other (Alert, oriented to 

person only.)





- HEENT


HEENT: Atraumatic, PERRL, Moist mucous membranes, Pharynx benign





- Neck


Neck: Supple, no meningeal sign





- Cardiac


Cardiac: RRR, Strong equal pulses





- Respiratory


Respiratory: No respiratory distress, Clear bilaterally





- Abdomen


Abdomen: Soft, Non tender, Non distended





- Male 


Male : Other (Mild redness and swelling to the glans of the penis.)





- Back


Back: No CVA TTP, No spinal TTP





- Derm


Derm: Warm and dry





- Extremities


Extremities: No deformity, No edema, No calf tenderness / cord





- Neuro


Neuro: No motor deficit, No sensory deficit


Eye Opening: Spontaneous


Motor: Obeys Commands


Verbal: Confused


GCS Score: 14





Results





- Vitals


Vitals: 


                               Vital Signs - 24 hr











  05/29/23 05/29/23 05/29/23





  13:37 15:45 16:30


 


Temperature 36.6 C  


 


Heart Rate 91 87 77


 


Respiratory 16 16 20





Rate   


 


Blood Pressure 102/64  108/73


 


O2 Saturation 99 97 100








                                     Oxygen











O2 Source [With Activity]      Room air


 


O2 Source                      Room air

















- Labs


Labs: 


                                Laboratory Tests











  05/29/23 05/29/23 05/29/23





  14:22 14:22 14:22


 


WBC  11.2 H  


 


RBC  3.83 L  


 


Hgb  12.4 L  


 


Hct  39.5 L  


 


MCV  103.1 H  


 


MCH  32.4 H  


 


MCHC  31.4 L  


 


RDW  14.1  


 


Plt Count  295  


 


MPV  10.6  


 


Neut # (Auto)  8.1 H  


 


Lymph # (Auto)  1.4 L  


 


Mono # (Auto)  1.5 H  


 


Eos # (Auto)  0.1  


 


Baso # (Auto)  0.1  


 


Absolute Nucleated RBC  0.00  


 


Nucleated RBC %  0.0  


 


PT   14.8 H 


 


INR   1.3 H 


 


Sodium    141


 


Potassium    3.8


 


Chloride    111


 


Carbon Dioxide    28


 


Anion Gap    2.0 L


 


BUN    14


 


Creatinine    0.9


 


Estimated GFR (MDRD)    81 L


 


Glucose    110 H


 


Lactic Acid   


 


Calcium    8.5


 


Total Bilirubin    0.7


 


AST    14


 


ALT    < 10 L


 


Alkaline Phosphatase    55


 


Total Protein    6.6 L


 


Albumin    3.4


 


Globulin    3.2


 


Albumin/Globulin Ratio    1.1


 


Lipase    35


 


Urine Color   


 


Urine Clarity   


 


Urine pH   


 


Ur Specific Gravity   


 


Urine Protein   


 


Urine Glucose (UA)   


 


Urine Ketones   


 


Urine Occult Blood   


 


Urine Nitrite   


 


Urine Bilirubin   


 


Urine Urobilinogen   


 


Ur Leukocyte Esterase   


 


Urine RBC   


 


Urine WBC   


 


Ur Squamous Epith Cells   


 


Urine Bacteria   


 


Urine Yeast   


 


Ur Microscopic Review   


 


Urine Culture Comments   














  05/29/23 05/29/23





  14:26 15:39


 


WBC  


 


RBC  


 


Hgb  


 


Hct  


 


MCV  


 


MCH  


 


MCHC  


 


RDW  


 


Plt Count  


 


MPV  


 


Neut # (Auto)  


 


Lymph # (Auto)  


 


Mono # (Auto)  


 


Eos # (Auto)  


 


Baso # (Auto)  


 


Absolute Nucleated RBC  


 


Nucleated RBC %  


 


PT  


 


INR  


 


Sodium  


 


Potassium  


 


Chloride  


 


Carbon Dioxide  


 


Anion Gap  


 


BUN  


 


Creatinine  


 


Estimated GFR (MDRD)  


 


Glucose  


 


Lactic Acid  1.5 


 


Calcium  


 


Total Bilirubin  


 


AST  


 


ALT  


 


Alkaline Phosphatase  


 


Total Protein  


 


Albumin  


 


Globulin  


 


Albumin/Globulin Ratio  


 


Lipase  


 


Urine Color   BROWN


 


Urine Clarity   CLOUDY


 


Urine pH   6.5


 


Ur Specific Gravity   >=1.030 H


 


Urine Protein   100 H


 


Urine Glucose (UA)   NEGATIVE


 


Urine Ketones   TRACE


 


Urine Occult Blood   LARGE H


 


Urine Nitrite   POSITIVE H


 


Urine Bilirubin   NEGATIVE


 


Urine Urobilinogen   0.2 (NORMAL)


 


Ur Leukocyte Esterase   MODERATE H


 


Urine RBC   TNTC H


 


Urine WBC   >25 H


 


Ur Squamous Epith Cells   NONE SEEN


 


Urine Bacteria   Few


 


Urine Yeast   PRESENT


 


Ur Microscopic Review   INDICATED


 


Urine Culture Comments   INDICATED














- Rads (name of study)


  ** Head CT


Relevant Findings:: Final report received, See rad report





  ** Abdomen pelvis CT


Relevant Findings:: Final report received, See rad report





  ** Chest x-ray


Relevant Findings:: Final report received, See rad report





PD Medical Decision Making





- ED course


Complexity details: reviewed results, re-evaluated patient, considered 

differential, d/w patient


Reviewed Lab Results: 





CBC shows a mildly elevated white blood cell count.  Coagulation studies have a 

mildly elevated INR.  Lactic acid is 1.5.  Creatinine normal.


ED course: 





Patient is well-appearing, nontoxic.  Afebrile.  His urinalysis is consistent 

with a UTI.  No evidence of urosepsis.  CT shows bladder diverticulum but no 

acute abnormalities.  No evidence of nephrolithiasis or ureterolithiasis.  No 

evidence of pneumonia.  Given IV Rocephin.  We will also place on mupirocin for 

the balanitis.  We will have him follow-up with his urologist for further care. 

Patient was given IV fluids as well. Patient appears to be at his normal gait 

and balance.  No focal neurological deficits to suggest stroke. Family counseled

regarding signs and symptoms for which I believe and urgent re-evaluation would 

be necessary. Family with good understanding of and agreement to plan and is 

comfortable going home at this time





This document was made in part using voice recognition software. While efforts 

are made to proofread this document, sound alike and grammatical errors may 

occur.





Departure





- Departure


Disposition: 01 Home, Self Care


Clinical Impression: 


 Balanitis





UTI (urinary tract infection)


Qualifiers:


 Urinary tract infection type: acute cystitis Hematuria presence: without 

hematuria Qualified Code(s): N30.00 - Acute cystitis without hematuria





Condition: Good


Instructions:  ED Balanitis, ED UTI Cystitis Male


Follow-Up: 


Luis Neff MD [Primary Care Provider] - Within 1 week


Donald Moreau MD [Physician No Access] - 


Prescriptions: 


Mupirocin 2% Oint [Bactroban 2% Oint] 1 applic TOP BID #22 gm


Cefpodoxime Proxetil [Vantin] 100 mg PO Q12H #14 tablet


Comments: 


Follow up with his doctor for further care. We have given him IV fluids and IV 

antibiotics.  You can apply the mupirocin to the head of the penis twice daily 

to help with the infection there.  Make sure to clean and dry the area well.  We

 will change him to a different antibiotic.  Please follow-up with his urologist

 for further care.


Discharge Date/Time: 05/29/23 17:05





NIHSS





- Time


Time: 14:05





- Level of Consciousness


Level of consciousness: (0) Alert, Keenly responsive


LOC Questions: (1) Answers one Q correctly


LOC Commands: (0) Performs both correctly





- Gaze


Best Gaze: (0) Normal





- Visual


Visual: (0) No loss





- Facial Palsy


Facial Palsy: (0) Normal, symmetrical movement





- Motor Arms (both separate)


Motor Arm (right): (0) No drift


Motor Arm (left): (0) No drift





- Motor Legs (both separate)


Motor Leg (right): (0) No drift


Motor Leg (left): (0) No drift





- Limb Ataxia


Limb Ataxia: (0) Absent





- Sensory


Sensory: (0) Normal





- Best Language


Best Language: (0) No aphasia





- Dysarthria


Dysarthria: (0) Normal





- Extinction and Inattention (formally neg


Extinction and inattention: (0) No abnormality





- Total Score/Results


Total Score/Result: 1

## 2023-05-29 NOTE — CT REPORT
PROCEDURE:  HEAD WO

 

INDICATIONS:  weakness, aloc

 

TECHNIQUE:  

Noncontrast 4.5 mm thick angled axial sections acquired from the foramen magnum to the vertex.  For r
adiation dose reduction, the following was used:  automated exposure control, adjustment of mA and/or
 kV according to patient size.

 

COMPARISON:  2/2/2023

 

FINDINGS:  

Image quality:  Excellent.  

 

CSF spaces:  Basal cisterns are patent.  No extra-axial fluid collections.  Ventricles are normal in 
size and shape.  

 

Brain: No midline shift.  No intracranial masses or hemorrhage.  No mass effect. Gray-white matter in
terface is normal. There cerebral volume loss for age with resultant ventricular and sulcal prominenc
e. There are periventricular and deep white matter chronic small vessel ischemic changes. Atheroscler
otic calcifications are noted in the intracranial segments of the bilateral internal carotid arteries
. Stable encephalomalacia involving the posterior right frontoparietal lobe from remote infarction.

 

Skull and face:  Calvarium and visualized facial bones are intact, without suspicious lesions.  

 

Sinuses:  Visualized sinuses and mastoids are clear.  

 

 

IMPRESSION:  

CT head without acute intracranial abnormalities.

 

Stable age-related senescent changes and sequela of chronic small vessel ischemic disease.

 

Stable appearance of encephalomalacia involving the right frontoparietal lobe compatible with remote 
infarction.

 

No acute calvarial fractures.

 

 

 

Reviewed by: Eloy Bee MD on 5/29/2023 4:15 PM PDT

Approved by: Eloy Bee MD on 5/29/2023 4:15 PM PDT

 

 

Station ID:  [id number]

## 2023-05-29 NOTE — CT REPORT
PROCEDURE:  ABDOMEN/PELVIS W

 

INDICATIONS:  recurrent UTI, no clearing with abx, hematuria

 

CONTRAST: 100ml Omnipaque 300 

 

TECHNIQUE: 

After the administration of intravenous contrast, 5 mm thick sections acquired from the diaphragms to
 the symphysis.  5 mm thick coronal and sagittal reformats were acquired.  For radiation dose reducti
on, the following was used:  automated exposure control, adjustment of mA and/or kV according to cortney
ent size.  

 

COMPARISON:  2/2/2023

 

FINDINGS:  

Image quality: Excellent.

 

Lung bases and heart: No consolidations or pleural effusions. Mildly enlarged heart. Moderate-sized h
iatal hernia.

 

Liver: No solid mass.

Gallbladder and biliary tree: Partially decompressed gallbladder. No biliary tree dilatation.

Spleen: Probable prior splenectomy with residual splenosis.

Pancreas: No pancreatic ductal dilation.

Adrenals: No adrenal nodule.

Kidneys and ureters: Symmetric enhancement. Several renal cysts. No visible solid masses. No hydronep
hrosis or intrarenal calculi. No hydroureter. 

 

Bowel and peritoneum: No bowel distension. Increased quantity of solid stool throughout the colon. No
 pathologic free fluid. Diverticulosis without evidence of diverticulitis. 

Lymph nodes: No central or retroperitoneal adenopathy.

Vessels: No infrarenal aortic aneurysm. Mild abdominal aortic calcification.

 

PELVIS

Reproductive organs: Partially obscured by left hip arthroplasty artifact.

Bladder: Partially distended urinary bladder. There is a prominent probably narrow necked posterior b
ladder diverticulum with dependent material and small calcification. Small left posterolateral bladde
r diverticulum. Little change compared to the prior study.

Pelvic lymph nodes: No pelvic adenopathy by size criteria.

 

Bones: [Arthroplasty components in place. There is a severe L1 compression fracture which has progres
sed since the prior study and now demonstrates moderate retropulsion into the central canal by about 
30%.

Other: Tiny fat-containing umbilical hernia.

 

 

IMPRESSION:  

 

1. No significant change to large and small bladder diverticula with dependent findings concerning fo
r urinary stasis.

2. Degree of prostatomegaly and bladder outlet obstruction is not well evaluated due to beam hardenin
g artifact from left upper thigh plasty.

3. Progression of L1 compression fracture now with retropulsion of fracture fragments into the spinal
 canal.

4. No evidence of pyelonephritis or upper urinary tract obstruction.

 

 

 

Reviewed by: Charo Perez MD on 5/29/2023 3:46 PM AKDT

Approved by: Charo Perez MD on 5/29/2023 3:46 PM TYLER

 

 

Station ID:  [id number]

## 2023-05-29 NOTE — XRAY REPORT
PROCEDURE:  Chest 1 View X-Ray

 

INDICATIONS:  cough

 

TECHNIQUE:  One view of the chest was acquired.  

 

COMPARISON:  2/2/2023

 

FINDINGS:  

 

Surgical changes and devices:  None.  

 

Lungs and pleura:  No pleural effusions or pneumothorax.  Lungs are clear.  

 

Mediastinum:  Mild cardiomegaly. Stable cardiomediastinal contour.

 

Bones and chest wall:  No suspicious bony lesions.  Overlying soft tissues appear unremarkable.  

 

 

IMPRESSION:  

 

 

1. Stable cardiomegaly without significant central vascular congestion or aortic contour change.

 

 

 

Reviewed by: Charo Perez MD on 5/29/2023 2:50 PM AKDT

Approved by: Charo Perez MD on 5/29/2023 2:50 PM AKDT

 

 

Station ID:  [id number]

## 2023-05-31 ENCOUNTER — HOSPITAL ENCOUNTER (INPATIENT)
Dept: HOSPITAL 76 - ED | Age: 78
LOS: 13 days | Discharge: NURSING, REHAB OR LONG-TERM CARE | DRG: 872 | End: 2023-06-13
Attending: INTERNAL MEDICINE | Admitting: INTERNAL MEDICINE
Payer: MEDICARE

## 2023-05-31 ENCOUNTER — HOSPITAL ENCOUNTER (OUTPATIENT)
Dept: HOSPITAL 76 - EMS | Age: 78
Discharge: ANOTHER HOSPITAL | End: 2023-05-31
Payer: MEDICARE

## 2023-05-31 DIAGNOSIS — R40.0: ICD-10-CM

## 2023-05-31 DIAGNOSIS — G89.29: ICD-10-CM

## 2023-05-31 DIAGNOSIS — Z79.01: ICD-10-CM

## 2023-05-31 DIAGNOSIS — Z66: ICD-10-CM

## 2023-05-31 DIAGNOSIS — Z86.73: ICD-10-CM

## 2023-05-31 DIAGNOSIS — F41.9: ICD-10-CM

## 2023-05-31 DIAGNOSIS — R53.1: ICD-10-CM

## 2023-05-31 DIAGNOSIS — R53.1: Primary | ICD-10-CM

## 2023-05-31 DIAGNOSIS — R44.3: ICD-10-CM

## 2023-05-31 DIAGNOSIS — N40.1: ICD-10-CM

## 2023-05-31 DIAGNOSIS — Z87.440: ICD-10-CM

## 2023-05-31 DIAGNOSIS — A41.9: Primary | ICD-10-CM

## 2023-05-31 DIAGNOSIS — Z78.1: ICD-10-CM

## 2023-05-31 DIAGNOSIS — I10: ICD-10-CM

## 2023-05-31 DIAGNOSIS — M79.642: ICD-10-CM

## 2023-05-31 DIAGNOSIS — Z74.01: ICD-10-CM

## 2023-05-31 DIAGNOSIS — N30.00: ICD-10-CM

## 2023-05-31 DIAGNOSIS — Z87.891: ICD-10-CM

## 2023-05-31 DIAGNOSIS — E78.00: ICD-10-CM

## 2023-05-31 DIAGNOSIS — I48.20: ICD-10-CM

## 2023-05-31 DIAGNOSIS — N39.0: ICD-10-CM

## 2023-05-31 DIAGNOSIS — F02.80: ICD-10-CM

## 2023-05-31 DIAGNOSIS — F03.90: ICD-10-CM

## 2023-05-31 DIAGNOSIS — N39.498: ICD-10-CM

## 2023-05-31 DIAGNOSIS — T40.2X5A: ICD-10-CM

## 2023-05-31 DIAGNOSIS — M54.50: ICD-10-CM

## 2023-05-31 DIAGNOSIS — R33.8: ICD-10-CM

## 2023-05-31 DIAGNOSIS — G30.9: ICD-10-CM

## 2023-05-31 DIAGNOSIS — I48.91: ICD-10-CM

## 2023-05-31 DIAGNOSIS — R50.9: ICD-10-CM
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
## 2023-05-31 DIAGNOSIS — R41.0: ICD-10-CM

## 2023-05-31 LAB
ALBUMIN DIAFP-MCNC: 3.3 G/DL (ref 3.2–5.5)
ALBUMIN/GLOB SERPL: 1 {RATIO} (ref 1–2.2)
ALP SERPL-CCNC: 50 IU/L (ref 42–121)
ALT SERPL W P-5'-P-CCNC: 11 IU/L (ref 10–60)
ANION GAP SERPL CALCULATED.4IONS-SCNC: 7 MMOL/L (ref 6–13)
AST SERPL W P-5'-P-CCNC: 17 IU/L (ref 10–42)
B PARAPERT DNA SPEC QL NAA+PROBE: NOT DETECTED
B PERT DNA SPEC QL NAA+PROBE: NOT DETECTED
BASOPHILS # BLD MANUAL: 0.3 10^3/UL (ref 0–0.1)
BASOPHILS NFR BLD AUTO: 0.5 %
BASOPHILS NFR SPEC MANUAL: 2 %
BILIRUB BLD-MCNC: 0.9 MG/DL (ref 0.2–1)
BILIRUB UR QL CFM: NEGATIVE
BUN SERPL-MCNC: 13 MG/DL (ref 6–20)
C PNEUM DNA NPH QL NAA+NON-PROBE: NOT DETECTED
CALCIUM UR-MCNC: 8.3 MG/DL (ref 8.5–10.3)
CHLORIDE SERPL-SCNC: 105 MMOL/L (ref 101–111)
CLARITY UR REFRACT.AUTO: (no result)
CO2 SERPL-SCNC: 26 MMOL/L (ref 21–32)
CREAT SERPLBLD-SCNC: 0.9 MG/DL (ref 0.6–1.2)
EOSINOPHIL # BLD MANUAL: 0 10^3/UL (ref 0–0.7)
EOSINOPHIL NFR BLD AUTO: 0.4 %
ERYTHROCYTE [DISTWIDTH] IN BLOOD BY AUTOMATED COUNT: 13.7 % (ref 12–15)
FLUAV RNA RESP QL NAA+PROBE: NOT DETECTED
GFRSERPLBLD MDRD-ARVRAT: 81 ML/MIN/{1.73_M2} (ref 89–?)
GLOBULIN SER-MCNC: 3.3 G/DL (ref 2.1–4.2)
GLUCOSE SERPL-MCNC: 96 MG/DL (ref 70–100)
GLUCOSE UR QL STRIP.AUTO: NEGATIVE MG/DL
HAEM INFLU B DNA SPEC QL NAA+PROBE: NOT DETECTED
HCOV 229E RNA SPEC QL NAA+PROBE: NOT DETECTED
HCOV HKU1 RNA UPPER RESP QL NAA+PROBE: NOT DETECTED
HCOV NL63 RNA ASPIRATE QL NAA+PROBE: NOT DETECTED
HCOV OC43 RNA SPEC QL NAA+PROBE: NOT DETECTED
HCT VFR BLD AUTO: 40.6 % (ref 42–52)
HGB UR QL STRIP: 13 G/DL (ref 14–18)
HMPV AG SPEC QL: NOT DETECTED
HPIV1 RNA NPH QL NAA+PROBE: NOT DETECTED
HPIV2 SPEC QL CULT: NOT DETECTED
HPIV3 AB TITR SER CF: NOT DETECTED {TITER}
HPIV4 RNA SPEC QL NAA+PROBE: NOT DETECTED
KETONES UR QL STRIP.AUTO: (no result) MG/DL
LYMPH ABN NFR BLD MANUAL: 0 %
LYMPHOBLASTS # BLD: 10 %
LYMPHOCYTES # BLD MANUAL: 1.4 10^3/UL (ref 1.5–3.5)
LYMPHOCYTES NFR BLD AUTO: 8.9 %
M PNEUMO DNA SPEC QL NAA+PROBE: NOT DETECTED
MANUAL DIF COMMENT BLD-IMP: (no result)
MCH RBC QN AUTO: 32.3 PG (ref 27–31)
MCHC RBC AUTO-ENTMCNC: 32 G/DL (ref 32–36)
MCV RBC AUTO: 100.7 FL (ref 80–94)
MONOCYTES # BLD MANUAL: 1.1 10^3/UL (ref 0–1)
MONOCYTES NFR BLD AUTO: 13.6 %
NEUTROPHILS # SNV AUTO: 13.7 X10^3/UL (ref 4.8–10.8)
NEUTROPHILS NFR BLD AUTO: 76.2 %
NEUTROPHILS NFR BLD MANUAL: 11 10^3/UL (ref 1.5–6.6)
NEUTS BAND NFR BLD: 0 %
NITRITE UR QL STRIP.AUTO: POSITIVE
PDW BLD AUTO: 11.2 FL (ref 7.4–11.4)
PH UR STRIP.AUTO: 6.5 PH (ref 5–7.5)
PLAT MORPH BLD: (no result)
PLATELET # BLD: 288 10^3/UL (ref 130–450)
PLATELET BLD QL SMEAR: (no result)
POTASSIUM SERPL-SCNC: 3.7 MMOL/L (ref 3.5–5)
PROT SPEC-MCNC: 6.6 G/DL (ref 6.7–8.2)
PROT UR STRIP.AUTO-MCNC: >=300 MG/DL
RBC # UR STRIP.AUTO: (no result) /UL
RBC # URNS HPF: (no result) /HPF (ref 0–5)
RBC MAR: 4.03 10^6/UL (ref 4.7–6.1)
RBC MORPH BLD: (no result)
RSV RNA RESP QL NAA+PROBE: NOT DETECTED
RV+EV RNA SPEC QL NAA+PROBE: NOT DETECTED
SARS-COV-2 RNA PNL SPEC NAA+PROBE: NOT DETECTED
SODIUM SERPLBLD-SCNC: 138 MMOL/L (ref 135–145)
SP GR UR STRIP.AUTO: 1.02 (ref 1–1.03)
SQUAMOUS URNS QL MICRO: (no result)
UROBILINOGEN UR QL STRIP.AUTO: (no result) E.U./DL
UROBILINOGEN UR STRIP.AUTO-MCNC: NEGATIVE MG/DL
WBC # UR MANUAL: (no result) /HPF (ref 0–3)

## 2023-05-31 PROCEDURE — 87633 RESP VIRUS 12-25 TARGETS: CPT

## 2023-05-31 PROCEDURE — 83735 ASSAY OF MAGNESIUM: CPT

## 2023-05-31 PROCEDURE — 84145 PROCALCITONIN (PCT): CPT

## 2023-05-31 PROCEDURE — 73120 X-RAY EXAM OF HAND: CPT

## 2023-05-31 PROCEDURE — 82607 VITAMIN B-12: CPT

## 2023-05-31 PROCEDURE — 87040 BLOOD CULTURE FOR BACTERIA: CPT

## 2023-05-31 PROCEDURE — 97166 OT EVAL MOD COMPLEX 45 MIN: CPT

## 2023-05-31 PROCEDURE — 97530 THERAPEUTIC ACTIVITIES: CPT

## 2023-05-31 PROCEDURE — 83605 ASSAY OF LACTIC ACID: CPT

## 2023-05-31 PROCEDURE — 81003 URINALYSIS AUTO W/O SCOPE: CPT

## 2023-05-31 PROCEDURE — 71045 X-RAY EXAM CHEST 1 VIEW: CPT

## 2023-05-31 PROCEDURE — 80053 COMPREHEN METABOLIC PANEL: CPT

## 2023-05-31 PROCEDURE — 80048 BASIC METABOLIC PNL TOTAL CA: CPT

## 2023-05-31 PROCEDURE — 99285 EMERGENCY DEPT VISIT HI MDM: CPT

## 2023-05-31 PROCEDURE — 87086 URINE CULTURE/COLONY COUNT: CPT

## 2023-05-31 PROCEDURE — 81001 URINALYSIS AUTO W/SCOPE: CPT

## 2023-05-31 PROCEDURE — 85025 COMPLETE CBC W/AUTO DIFF WBC: CPT

## 2023-05-31 PROCEDURE — 97162 PT EVAL MOD COMPLEX 30 MIN: CPT

## 2023-05-31 PROCEDURE — 36415 COLL VENOUS BLD VENIPUNCTURE: CPT

## 2023-05-31 RX ADMIN — SODIUM CHLORIDE SCH MLS/HR: 9 INJECTION, SOLUTION INTRAVENOUS at 21:04

## 2023-05-31 NOTE — XRAY REPORT
PROCEDURE:  Chest 1 View X-Ray

 

INDICATIONS:  chest pain

 

TECHNIQUE:  One view of the chest was acquired.  

 

COMPARISON:  None.

 

FINDINGS:  

 

Surgical changes and devices:  None.  

 

Lungs and pleura:  No pleural effusions or pneumothorax.  Lungs are clear.  

 

Mediastinum:  Mediastinal contours appear normal.  Heart size is normal.  

 

Bones and chest wall:  No suspicious bony lesions.  Overlying soft tissues appear unremarkable.  

 

 

IMPRESSION:  

 

No acute cardiopulmonary process.

 

 

 

Reviewed by: Aroldo Galvan on 5/31/2023 7:47 PM PDT

Approved by: Aroldo Galvan on 5/31/2023 7:47 PM PDT

 

 

Station ID:  [id number]

## 2023-05-31 NOTE — HISTORY & PHYSICAL EXAMINATION
Chief Complaint





- Chief Complaint


Chief Complaint: fever and chils





History of Present Illness





- Admitted From


Admitted From:: Home





- History Obtained From


Records Reviewed: Yes 


History obtained from: ER MD and review of records 


Exam Limitations: Patient has dementia 





- History of Present Illness


HPI Comment/Other: 


78-year-old male return to the emergency department via EMS for concerns of 

possible sepsis as well as positive blood cultures.  This patient was seen here 

in this emergency department on 29 May by my colleague.  At that time there were

some concerns of abdominal pain and dysuria as well as recurrent urinary tract 

infections.  Subsequently labs and urinalysis were obtained.  Labs felt to be 

consistent with acute infection and patient was started on cefpodoxime.  At the 

last ED visit he had a very mild white blood cell count elevation of 11.2 

thousand.  Lactate was not elevated.  CT of the abdomen showed no acute 

findings.  Patient was discharged with prescription for cefpodoxime and 

treatment of the UTI.  Subsequently blood cultures have resulted positive in one

of the 2 sets for staph epidermis.  This most likely represents a contaminant 

but when patient's wife was contacted at home she reported the patient had been 

febrile up to 102, more lethargic and confused from known baseline.





EMS reported that they had given him some cold IV fluids in route to the ER and 

on presentation here he is afebrile.  He is confused but without focal deficits.





Given the patient's dementia history is obtained from the chart as well as EMS 

and previous ED records


Patient was discussed in detail with ER team, patient has dementia and was not 

able to give me any meaningful history on the televideo, in the back group his 

telemetry did show A fib with rate in 's 


Patient given one dose of Vanc and Cefepime as per wife patient is full DNR and 

plan to send him to memory care unit once dc from hospital 


Wife ok with medical management 








History





- Past Medical History


Cardiovascular: reports: Hypertension, High cholesterol, Atrial fibrillation


Respiratory: reports: None


Neuro: reports: Dementia, CVA


Endocrine/Autoimmune: reports: None


GI: reports: None


: reports: Benign prostate hypertrophy, Retention, Incontinence, Indwelling 

catheter


HEENT: reports: None


Psych: reports: Anxiety, Other


Musculoskeletal: reports: Chronic back pain


Derm: reports: None


MRSA Hx?: No





- Past Surgical History


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


Ortho: reports: Hip replacement, Knee replacement


HEENT: reports: Tonsil/Adenoidectomy, Other





- Family & Social History


Family History: Mother: , CAD, Diabetes, Type 2, Father: , 

Alzheimer's Disease


Family History Comment/Other: father: dementia.  at age 93.  mother: 

Diabetes mellitus and CHF


Social History Notes: 30+ smoking history. Quit in .  Rarely drinks alcohol.

No illicit drugs





- POLST


Patient has POLST: No


POLST Status: Full Code





Meds/Allgy





- Home Medications


Home Medications: 


                                Ambulatory Orders











 Medication  Instructions  Recorded  Confirmed


 


Finasteride [Proscar] 5 mg PO DAILY  tablet 09/15/20 02/04/23


 


Tamsulosin [Flomax] 0.4 mg PO DAILY #30 capsule 09/15/20 02/04/23


 


Walker [Ultra-Light Rollator] 1 each MC DAILY #1 each 09/15/20 01/30/21


 


QUEtiapine [SEROquel] 75 mg PO DAILY PM 21


 


Dabigatran Etexilate Mesylate 150 mg PO BID 21





[Pradaxa]   


 


Lidocaine Patch 5% [Lidoderm Patch] 1 patch TOP DAILY PRN #10 patch 23


 


Oxycodone HCl/Acetaminophen 1 each PO Q6H PRN #14 tablet 23





[Percocet 5-325 mg Tablet]   


 


Calcitonin [Fortical] 1 sprays KARL DAILY #1 each 23 


 


oxyCODONE [Roxicodone] 5 mg PO Q6H PRN #20 tablet 23 


 


Cefpodoxime Proxetil [Vantin] 100 mg PO Q12H #14 tablet 23 


 


Mupirocin 2% Oint [Bactroban 2% 1 applic TOP BID #22 gm 23 





Oint]   














- Allergies


Allergies/Adverse Reactions: 


                                    Allergies











Allergy/AdvReac Type Severity Reaction Status Date / Time


 


No Known Drug Allergies Allergy   Verified 23 18:18














Review of Systems





- Other Findings


Other Findings: 





14 systme review attempted but unable to obtain due to dementia 


Prior Level of Functionality: 





unable to assess lives home with wife 





Exam





- Vital Signs


Vital Signs: 


                                Vital Signs x48h











  Temp Pulse Resp BP Pulse Ox


 


 23 18:18  36.9 C  100  18  160/97 H  97














- Physical Exam


General Appearance: positive: No acute distress


Eyes Bilateral: positive: PERRL


Neck: positive: Thyroid nml, No JVD


Respiratory: positive: Breath sounds nml


Cardiovascular: positive: Irregularly irregular


Abdomen: positive: Non-tender, No organomegaly


Skin: positive: Color nml, No rash


Neurologic/Psychiatric: positive: Motor nml, Sensation nml





Sepsis Event Note (H)





- Evaluation


Current Stage of Sepsis: Sepsis


Possible source of Sepsis: positive: Unknown





- Sepsis Criteria


Sepsis Criteria: Recorded Heart Rate greater than 90 bpm, WBC count greater than

10% bands, WBC count greater than 12,000 or less than 4000





Conclusion/Plan





- Problem List


(1) History of dementia


Conclusion/Plan: 


Supportive care 


Continue home meds


Fall precautions











(2) Sepsis


Conclusion/Plan: 


IVF 


Borad spectrum abx


Vanco and Cefepime only one dose given will need to reorder in am


lactate is 1.6


Repeat labs in am


monitor urine output


Follow up blood culture 


Urine cultures shows no growth from 2 days ago 








This was a televideo visit using technology





Patient and me were both in different locations





ER team informed the patient wife and patient about our visit 


Qualifiers: 


   Sepsis type: sepsis due to unspecified organism   Sepsis acute organ 

dysfunction status: without acute organ dysfunction   Qualified Code(s): A41.9 -

Sepsis, unspecified organism   





(3) UTI (urinary tract infection)


Conclusion/Plan: 


On Cefepime 


Qualifiers: 


   Urinary tract infection type: acute cystitis   Hematuria presence: without 

hematuria   Qualified Code(s): N30.00 - Acute cystitis without hematuria   





- Lab Results


Fish Bones: 


                                 23 18:25





                                 23 18:25





- EKG Results


EKG Findings: 





A fib on telemetry

## 2023-05-31 NOTE — EXTERNAL MEDICAL SUMMARY RPT
Continuity of Care Document



                            Created on: May 31, 2023





BAIRON ALVAREZ

External Reference #: [id number]

: 1945

Sex: Male



Demographics





                                        Address             [address] [place]  [address]

 

                                        Phone               Unavailable

 

                                        Preferred Language  English

 

                                        Marital Status      

 

                                        Restorationist Affiliation Unknown

 

                                        Race                Unknown

 

                                        Ethnic Group        Unknown





Author





                                        Name                Unknown

 

                                        [place]  [address]

 

                                        Phone               [phone]

 

                                        Organization        [facility]

 

                                        [place]  [address]

 

                                        Phone               [phone]





Care Team Providers





                                Care Team Member Name Role            Phone

 

                                Unavailable     Unavailable     Unavailable

 

                                Trevor Patient Registrar, Risa Unavailable 

    Unavailable

 

                                Brady Fuller Pa-C Unavailable     Unavailable







Medications





                                date            description     facility

 

                                2023-04-10 00:00 cephalexin      [facility]

 

                                2023-04-10 00:00 cephalexin      [facility] 

[facility] [place]

 

                                2023 00:00 cephalexin      [facility] [place]

 

                                2023 00:00 cephalexin      [facility] [place]

 

                                2023-04-10 00:00 ampicillin      [facility] [place]

 

                                2023-04-10 00:00 ampicillin      [facility] [place]

 

                                2023-04-10 00:00 cephalexin      [facility] 

[facility] [place]

 

                                2023-04-10 00:00 cephalexin      [facility] 

[facility] [place]

 

                                2023 00:00 cephalexin      [facility] [place]

 

                                2023 00:00 cephalexin      [facility] [place]

 

                                2023-04-10 00:00 ciprofloxacin hcl [facility]

[facility] [place]

 

                                2023-04-10 00:00 ampicillin      [facility]

 

                                2023-04-10 00:00 sulfamethoxazole-trimethoprim W

[facility]

 

                                2023 00:00 sulfamethoxazole-trimethoprim W

[facility]

 

                                2023 00:00 sulfamethoxazole-trimethoprim W

[facility] [place]

 

                                2023-04-10 00:00 ciprofloxacin hcl [facility]

[facility] [place]

 

                                2023-04-10 00:00 sulfamethoxazole-trimethoprim W

[facility] [place]

 

                                2023 00:00 sulfamethoxazole-trimethoprim W

[facility] [place]

 

                                2023 00:00 sulfamethoxazole-trimethoprim W

[facility] [place]

 

                                2023-04-10 00:00 sulfamethoxazole-trimethoprim W

[facility] [place]

 

                                2023 00:00 sulfamethoxazole-trimethoprim W

[facility] [place]

 

                                2023 00:00 sulfamethoxazole-trimethoprim W

[facility] [place]

 

                                2023-04-10 00:00 cephalexin      [facility] 

[facility] [place]

 

                                2023-04-10 00:00 cephalexin      [facility] 

[facility] [place]

 

                                2023 00:00 cephalexin      [facility] 

[facility] [place]

 

                                2023 00:00 cephalexin      [facility] 

[facility] [place]

 

                                2023-04-10 00:00 ampicillin      [facility] 

[facility] [place]

 

                                2023-04-10 00:00 cephalexin      [facility] 

[facility] [place]

 

                                2023-04-10 00:00 cephalexin      [facility] 

[facility] [place]

 

                                2023 00:00 cephalexin      [facility] 

[facility] [place]

 

                                2023 00:00 cephalexin      [facility] 

[facility] [place]

 

                                2023-04-10 00:00 sulfamethoxazole-trimethoprim W

[facility] [place]

 

                                2023 00:00 sulfamethoxazole-trimethoprim W

[facility] [place]

 

                                2023 00:00 sulfamethoxazole-trimethoprim W

[facility] [place]

 

                                2023-04-10 00:00 ciprofloxacin hcl [facility]

[facility] [place]

 

                                2023-04-10 00:00 ciprofloxacin hcl [facility]

[facility] [place]







Problems





                                date            description     facility

 

                                2023-04-10 00:00 Blood in urine  [facility] 

[facility] [place]

 

                                2023-04-10 00:00 Dysuria         [facility] 

[facility] [place]

 

                                2023-04-10 00:00 Hematuria, unspecified [facility] 

[facility] [place]







Procedures





                                date            description     facility

 

                                2023-04-10 00:00 Visit Code Hold [facility] 

[facility] 

[place]

 

                                2023-04-10 00:00 POC URINALYSIS DIP [facility]

[facility] 

[place]







Results/Labs





                test    date    author  facility value   unit    interpretation

 

                                                    Result panel 1 

 

                          (unknown)    (no date)    (unknown)    [facility] [place]          (no value)                (units 

unknown)                                (unknown)

 

                                                    Result panel 2 

 

                          (unknown)    (no date)    (unknown)    [facility] 

[facility] [place]          (no value)                (units 

unknown)                                (unknown)

 

                                                    Result panel 3 

 

                          (unknown)    (no date)    (unknown)    [facility] [place]          (no value)                (units 

unknown)                                (unknown)

 

                                                    Result panel 4 

 

                          (unknown)    (no date)    (unknown)    [facility] [place]          (no value)                (units 

unknown)                                (unknown)

 

                                                    Result panel 5 

 

                          (unknown)    (no date)    (unknown)    [facility] [place]          (no value)                (units 

unknown)                                (unknown)

 

                                                    Result panel 6 

 

                          (unknown)    (no date)    (unknown)    [facility] [place]          (no value)                (units 

unknown)                                (unknown)

 

                                                    Result panel 7 

 

                          (unknown)    (no date)    (unknown)    [facility] [place]          (no value)                (units 

unknown)                                (unknown)

 

                                                    Result panel 8 

 

                          (unknown)    (no date)    (unknown)    [facility] [place]          (no value)                (units 

unknown)                                (unknown)

 

                                                    Result panel 9 

 

                          (unknown)    (no date)    (unknown)    [facility] [place]          (no value)                (units 

unknown)                                (unknown)

 

                                                    Result panel 10 

 

                          (unknown)    (no date)    (unknown)    [facility] [place]          (no value)                (units 

unknown)                                (unknown)

 

                                                    Result panel 11 

 

                          (unknown)    (no date)    (unknown)    [facility] [place]          (no value)                (units 

unknown)                                (unknown)

 

                                                    Result panel 12 

 

                          (unknown)    (no date)    (unknown)    [facility] [place]          (no value)                (units 

unknown)                                (unknown)

 

                                                    Result panel 13 

 

                          (unknown)    (no date)    (unknown)    [facility] [place]          (no value)                (units 

unknown)                                (unknown)

 

                                                    Result panel 14 

 

                          (unknown)    (no date)    (unknown)    [facility] [place]          (no value)                (units 

unknown)                                (unknown)

 

                                                    Result panel 15 

 

                          (unknown)    (no date)    (unknown)    [facility] [place]          (no value)                (units 

unknown)                                (unknown)

 

                                                    Result panel 16 

 

                          (unknown)    (no date)    (unknown)    [facility] [place]          (no value)                (units 

unknown)                                (unknown)

 

                                                    Result panel 17 

 

                          (unknown)    (no date)    (unknown)    [facility] [place]          (no value)                (units 

unknown)                                (unknown)

 

                                                    Result panel 18 

 

                          (unknown)    (no date)    (unknown)    [facility] [place]          (no value)                (units 

unknown)                                (unknown)

 

                                                    Result panel 19 

 

                          (unknown)    (no date)    (unknown)    [facility] [place]          (no value)                (units 

unknown)                                (unknown)

 

                                                    Result panel 20 

 

                          (unknown)    (no date)    (unknown)    [facility] [place]          (no value)                (units 

unknown)                                (unknown)

 

                                                    Result panel 21 

 

                          (unknown)    (no date)    (unknown)    [facility] [place]          (no value)                (units 

unknown)                                (unknown)

 

                                                    Result panel 22 

 

                          (unknown)    (no date)    (unknown)    [facility] [place]          (no value)                (units 

unknown)                                (unknown)

 

                                                    Result panel 23 

 

                          (unknown)    (no date)    (unknown)    [facility] [place]          (no value)                (units 

unknown)                                (unknown)

 

                                                    Result panel 24 

 

                          (unknown)    (no date)    (unknown)    [facility] [place]          (no value)                (units 

unknown)                                (unknown)

 

                                                    Result panel 25 

 

                          (unknown)    (no date)    (unknown)    [facility] [place]          (no value)                (units 

unknown)                                (unknown)

 

                                                    Result panel 26 

 

                          (unknown)    (no date)    (unknown)    [facility] [place]          (no value)                (units 

unknown)                                (unknown)

 

                                                    Result panel 27 

 

                          (unknown)    (no date)    (unknown)    [facility] [place]          (no value)                (units 

unknown)                                (unknown)

 

                                                    Result panel 28 

 

                          (unknown)    (no date)    (unknown)    [facility] [place]          (no value)                (units 

unknown)                                (unknown)

 

                                                    Result panel 29 

 

                          (unknown)    (no date)    (unknown)    [facility] [place]          (no value)                (units 

unknown)                                (unknown)

 

                                                    Result panel 30 

 

                          (unknown)    (no date)    (unknown)    [facility] [place]          (no value)                (units 

unknown)                                (unknown)

 

                                                    Result panel 31 

 

                          (unknown)    (no date)    (unknown)    [facility] [place]          (no value)                (units 

unknown)                                (unknown)

 

                                                    Result panel 32 

 

                          (unknown)    (no date)    (unknown)    [facility] [place]          (no value)                (units 

unknown)                                (unknown)

 

                                                    Result panel 33 

 

                          (unknown)    (no date)    (unknown)    [facility] [place]          (no value)                (units 

unknown)                                (unknown)

 

                                                    Result panel 34 

 

                          (unknown)    (no date)    (unknown)    [facility] [place]          (no value)                (units 

unknown)                                (unknown)

 

                                                    Result panel 35 

 

                          (unknown)    (no date)    (unknown)    [facility] [place]          (no value)                (units 

unknown)                                (unknown)

 

                                                    Result panel 36 

 

                          (unknown)    (no date)    (unknown)    [facility] [place]          (no value)                (units 

unknown)                                (unknown)

 

                                                    Result panel 37 

 

                          (unknown)    (no date)    (unknown)    [facility] [place]          (no value)                (units 

unknown)                                (unknown)

 

                                                    Result panel 38 

 

                          (unknown)    (no date)    (unknown)    [facility] [place]          (no value)                (units 

unknown)                                (unknown)

 

                                                    Result panel 39 

 

                          (unknown)    (no date)    (unknown)    [facility] [place]          (no value)                (units 

unknown)                                (unknown)

 

                                                    Result panel 40 

 

                          (unknown)    (no date)    (unknown)    [facility] [place]          (no value)                (units 

unknown)                                (unknown)

 

                                                    Result panel 41 

 

                          (unknown)    (no date)    (unknown)    [facility] [place]          (no value)                (units 

unknown)                                (unknown)

 

                                                    Result panel 42 

 

                          (unknown)    (no date)    (unknown)    [facility] [place]          (no value)                (units 

unknown)                                (unknown)

 

                                                    Result panel 43 

 

                          (unknown)    (no date)    (unknown)    [facility] [place]          (no value)                (units 

unknown)                                (unknown)

 

                                                    Result panel 44 

 

                          (unknown)    (no date)    (unknown)    [facility] [place]          (no value)                (units 

unknown)                                (unknown)

 

                                                    Result panel 45 

 

                          (unknown)    (no date)    (unknown)    [facility] [place]          (no value)                (units 

unknown)                                (unknown)

 

                                                    Result panel 46 

 

                          (unknown)    (no date)    (unknown)    [facility] [place]          (no value)                (units 

unknown)                                (unknown)

 

                                                    Result panel 47 

 

                          (unknown)    (no date)    (unknown)    [facility] [place]          (no value)                (units 

unknown)                                (unknown)

 

                                                    Result panel 48 

 

                          (unknown)    (no date)    (unknown)    [facility] [place]          (no value)                (units 

unknown)                                (unknown)

 

                                                    Result panel 49 

 

                          (unknown)    (no date)    (unknown)    [facility] [place]          (no value)                (units 

unknown)                                (unknown)

 

                                                    Result panel 50 

 

                          (unknown)    (no date)    (unknown)    [facility] [place]          (no value)                (units 

unknown)                                (unknown)

 

                                                    Result panel 51 

 

                          (unknown)    (no date)    (unknown)    [facility] [place]          (no value)                (units 

unknown)                                (unknown)

 

                                                    Result panel 52 

 

                          (unknown)    (no date)    (unknown)    [facility] [place]          (no value)                (units 

unknown)                                (unknown)

 

                                                    Result panel 53 

 

                          (unknown)    (no date)    (unknown)    [facility] [place]          (no value)                (units 

unknown)                                (unknown)

 

                                                    Result panel 54 

 

                          (unknown)    (no date)    (unknown)    [facility] [place]          (no value)                (units 

unknown)                                (unknown)

 

                                                    Result panel 55 

 

                          (unknown)    (no date)    (unknown)    [facility] 

[facility] [place]          (no value)                (units 

unknown)                                (unknown)

 

                                                    Result panel 56 

 

                          (unknown)    (no date)    (unknown)    [facility] 

[facility] 

[facility] 

[facility] [place]          (no value)                (units 

unknown)                                (unknown)

 

                                                    Result panel 57 

 

                          (unknown)    (no date)    (unknown)    [facility] 

[facility] 

[facility] [place]          (no value)                (units 

unknown)                                (unknown)

 

                                                    Result panel 58 

 

                          (unknown)    (no date)    (unknown)    [facility] 

[facility] 

[facility] [place]          (no value)                (units 

unknown)                                (unknown)

 

                                                    Result panel 59 

 

                          (unknown)    (no date)    (unknown)    [facility] 

[facility] 

[facility] [place]          (no value)                (units 

unknown)                                (unknown)

 

                                                    Result panel 60 

 

                          (unknown)    (no date)    (unknown)    [facility] 

[facility] 

[facility] [place]          (no value)                (units 

unknown)                                (unknown)

 

                                                    Result panel 61 

 

                          (unknown)    (no date)    (unknown)    [facility] 

[facility] 

[facility] [place]          (no value)                (units 

unknown)                                (unknown)

 

                                                    Result panel 62 

 

                          (unknown)    (no date)    (unknown)    [facility] 

[facility] 

[facility] [place]          (no value)                (units 

unknown)                                (unknown)

 

                                                    Result panel 63 

 

                          (unknown)    (no date)    (unknown)    [facility] 

[facility] 

[facility] [place]          (no value)                (units 

unknown)                                (unknown)

 

                                                    Result panel 64 

 

                          (unknown)    (no date)    (unknown)    [facility] 

[facility] 

[facility] [place]          (no value)                (units 

unknown)                                (unknown)

 

                                                    Result panel 65 

 

                          (unknown)    (no date)    (unknown)    [facility] 

[facility] 

[facility] [place]          (no value)                (units 

unknown)                                (unknown)

 

                                                    Result panel 66 

 

                          (unknown)    (no date)    (unknown)    [facility] 

[facility] 

[facility] [place]          (no value)                (units 

unknown)                                (unknown)

 

                                                    Result panel 67 

 

                          (unknown)    (no date)    (unknown)    [facility] 

[facility] 

[facility]          (no value)                (units 

unknown)                                (unknown)

 

                                                    Result panel 68 

 

                          (unknown)    (no date)    (unknown)    [facility] 

[facility] 

[facility]          (no value)                (units 

unknown)                                (unknown)

 

                                                    Result panel 69 

 

                          (unknown)    (no date)    (unknown)    [facility] 

[facility] 

[facility] [place]          (no value)                (units 

unknown)                                (unknown)

 

                                                    Result panel 70 

 

                          (unknown)    (no date)    (unknown)    [facility] 

[facility] 

[facility] [place]          (no value)                (units 

unknown)                                (unknown)

 

                                                    Result panel 71 

 

                          (unknown)    (no date)    (unknown)    [facility] 

Primary Care & 

Ancillary 

Services [place]          (no value)                (units 

unknown)                                (unknown)

 

                                                    Result panel 72 

 

                          (unknown)    (no date)    (unknown)    [facility] 

Primary Care & 

Ancillary 

Services [place]          (no value)                (units 

unknown)                                (unknown)







Social History





                                date            description     facility

 

                                2023-04-10 00:00 Former smoker   [facility] 

Primary Care & Ancillary Services 

[place]







Vital Signs





                          date         measurement  value        units

 

                          2023-04-10 00:00 BMI          25.87        kg/m2

 

                          2023-04-10 00:00 BP_diastolic 68           mmHg

 

                          2023-04-10 00:00 BP_systolic  119          mmHg

 

                          2023-04-10 00:00 heart_rate   63           /min

 

                          2023-04-10 00:00 height_metric 175.9        cm

 

                          2023-04-10 00:00 height_standard 69.25        in

 

                          2023-04-10 00:00 respiration_rate 16           /min

 

                          2023-04-10 00:00 temperature_metric 36.61        C

 

                          2023-04-10 00:00 temperature_standard 97.9         F

 

                          2023-04-10 00:00 weight_metric 79.74        kg

 

                          2023-04-10 00:00 weight_standard 175.8        lb

## 2023-05-31 NOTE — ED PHYSICIAN DOCUMENTATION
History of Present Illness





- Stated complaint


Stated Complaint: SEPTIC





- Additonal information


Additional information: 


78-year-old male return to the emergency department via EMS for concerns of 

possible sepsis as well as positive blood cultures.  This patient was seen here 

in this emergency department on 29 May by my colleague.  At that time there were

some concerns of abdominal pain and dysuria as well as recurrent urinary tract 

infections.  Subsequently labs and urinalysis were obtained.  Labs felt to be 

consistent with acute infection and patient was started on cefpodoxime.  At the 

last ED visit he had a very mild white blood cell count elevation of 11.2 

thousand.  Lactate was not elevated.  CT of the abdomen showed no acute 

findings.  Patient was discharged with prescription for cefpodoxime and 

treatment of the UTI.  Subsequently blood cultures have resulted positive in one

of the 2 sets for staph epidermis.  This most likely represents a contaminant 

but when patient's wife was contacted at home she reported the patient had been 

febrile up to 102, more lethargic and confused from known baseline.





EMS reported that they had given him some cold IV fluids in route to the ER and 

on presentation here he is afebrile.  He is confused but without focal deficits.





Given the patient's dementia history is obtained from the chart as well as EMS 

and previous ED records





Review of Systems


Unable to obtain: Dementia





PD PAST MEDICAL HISTORY





- Past Medical History


Cardiovascular: Hypertension, High cholesterol, Atrial fibrillation


Respiratory: None


Neuro: Dementia, CVA


Endocrine/Autoimmune: None


GI: None


: Benign prostate hypertrophy, Retention, Incontinence, Indwelling catheter


HEENT: None


Psych: Anxiety, Other


Musculoskeletal: Chronic back pain


Derm: None





- Past Surgical History


Past Surgical History: Yes


General: Splenectomy, Other


Ortho: Hip replacement, Knee replacement


HEENT: Tonsil/Adenoidectomy, Other





- Present Medications


Home Medications: 


                                Ambulatory Orders











 Medication  Instructions  Recorded  Confirmed


 


Finasteride [Proscar] 5 mg PO DAILY  tablet 09/15/20 02/04/23


 


Tamsulosin [Flomax] 0.4 mg PO DAILY #30 capsule 09/15/20 02/04/23


 


Walker [Ultra-Light Rollator] 1 each MC DAILY #1 each 09/15/20 01/30/21


 


QUEtiapine [SEROquel] 75 mg PO DAILY PM 01/30/21 02/03/23


 


Dabigatran Etexilate Mesylate 150 mg PO BID 01/31/21 02/04/23





[Pradaxa]   


 


Lidocaine Patch 5% [Lidoderm Patch] 1 patch TOP DAILY PRN #10 patch 02/02/23 02/03/23


 


Oxycodone HCl/Acetaminophen 1 each PO Q6H PRN #14 tablet 02/02/23 02/03/23





[Percocet 5-325 mg Tablet]   


 


Calcitonin [Fortical] 1 sprays KARL DAILY #1 each 02/03/23 


 


oxyCODONE [Roxicodone] 5 mg PO Q6H PRN #20 tablet 02/03/23 


 


Cefpodoxime Proxetil [Vantin] 100 mg PO Q12H #14 tablet 05/29/23 


 


Mupirocin 2% Oint [Bactroban 2% 1 applic TOP BID #22 gm 05/29/23 





Oint]   














- Allergies


Allergies/Adverse Reactions: 


                                    Allergies











Allergy/AdvReac Type Severity Reaction Status Date / Time


 


No Known Drug Allergies Allergy   Verified 05/31/23 18:18














- Social History


Does the pt smoke?: No


Smoking Status: Never smoker


Does the pt drink ETOH?: Yes


Does the pt have substance abuse?: No





- Immunizations


Immunizations are current?: Yes





- POLST


Patient has POLST: No


POLST Status: Full Code





PD ED PE NORMAL





- General


General: No acute distress, Well developed/nourished.  No: Alert and oriented X 

3 (Confused and demented at baseline)





- Cardiac


Cardiac: RRR, No murmur





- Respiratory


Respiratory: No respiratory distress, Clear bilaterally





- Abdomen


Abdomen: Normal bowel sounds, Soft, Non tender (Generalized but nonfocal 

abdominal tenderness)





- Back


Back: No CVA TTP, No spinal TTP





- Derm


Derm: Normal color, Warm and dry





- Extremities


Extremities: No deformity, No tenderness to palpate, Normal ROM s pain





- Neuro


Neuro: Alert and oriented X 3, CNs 2-12 intact


Eye Opening: Spontaneous


Motor: Obeys Commands


Verbal: Oriented


GCS Score: 15





Results





- Vitals


Vitals: 


                               Vital Signs - 24 hr











  05/31/23





  18:18


 


Temperature 36.9 C


 


Heart Rate 100


 


Respiratory 18





Rate 


 


Blood Pressure 160/97 H


 


O2 Saturation 97








                                     Oxygen











O2 Source [With Activity]      Room air


 


O2 Source                      Room air

















- Labs


Labs: 


                                Laboratory Tests











  05/31/23 05/31/23 05/31/23





  18:25 18:25 18:25


 


WBC  13.7 H  


 


RBC  4.03 L  


 


Hgb  13.0 L  


 


Hct  40.6 L  


 


MCV  100.7 H  


 


MCH  32.3 H  


 


MCHC  32.0  


 


RDW  13.7  


 


Plt Count  288  


 


MPV  11.2  


 


Neut # (Auto)  Not Reportable  


 


Lymph # (Auto)  Not Reportable  


 


Mono # (Auto)  Not Reportable  


 


Eos # (Auto)  Not Reportable  


 


Baso # (Auto)  Not Reportable  


 


Absolute Nucleated RBC  Not Reportable  


 


Total Counted  100  


 


Band Neuts % (Manual)  0  


 


Abnorm Lymph % (Manual)  0  


 


Nucleated RBC %  Not Reportable  


 


Neutrophils # (Manual)  11.0 H  


 


Lymphocytes # (Manual)  1.4 L  


 


Monocytes # (Manual)  1.1 H  


 


Eosinophils # (Manual)  0.0  


 


Basophils # (Manual)  0.3 H  


 


Differential Comment  MANUAL DIFFERENTIAL  


 


Platelet Estimate  NORMAL (130-450,000)  


 


Platelet Morphology  NORMAL APPEARANCE  


 


RBC Morph Micro Appear  1+ MACROCYTOSIS  


 


Sodium   138 


 


Potassium   3.7 


 


Chloride   105 


 


Carbon Dioxide   26 


 


Anion Gap   7.0 


 


BUN   13 


 


Creatinine   0.9 


 


Estimated GFR (MDRD)   81 L 


 


Glucose   96 


 


Lactic Acid    1.6


 


Calcium   8.3 L 


 


Total Bilirubin   0.9 


 


AST   17 


 


ALT   11 


 


Alkaline Phosphatase   50 


 


Total Protein   6.6 L 


 


Albumin   3.3 


 


Globulin   3.3 


 


Albumin/Globulin Ratio   1.0 


 


Procalcitonin   


 


Urine Color   


 


Urine Clarity   


 


Urine pH   


 


Ur Specific Gravity   


 


Urine Protein   


 


Urine Glucose (UA)   


 


Urine Ketones   


 


Urine Occult Blood   


 


Urine Nitrite   


 


Urine Bilirubin   


 


Urine Urobilinogen   


 


Ur Leukocyte Esterase   


 


Urine RBC   


 


Urine WBC   


 


Ur Squamous Epith Cells   


 


Amorphous Sediment   


 


Urine Bacteria   


 


Ur Microscopic Review   


 


Urine Culture Comments   














  05/31/23 05/31/23





  18:25 18:40


 


WBC  


 


RBC  


 


Hgb  


 


Hct  


 


MCV  


 


MCH  


 


MCHC  


 


RDW  


 


Plt Count  


 


MPV  


 


Neut # (Auto)  


 


Lymph # (Auto)  


 


Mono # (Auto)  


 


Eos # (Auto)  


 


Baso # (Auto)  


 


Absolute Nucleated RBC  


 


Total Counted  


 


Band Neuts % (Manual)  


 


Abnorm Lymph % (Manual)  


 


Nucleated RBC %  


 


Neutrophils # (Manual)  


 


Lymphocytes # (Manual)  


 


Monocytes # (Manual)  


 


Eosinophils # (Manual)  


 


Basophils # (Manual)  


 


Differential Comment  


 


Platelet Estimate  


 


Platelet Morphology  


 


RBC Morph Micro Appear  


 


Sodium  


 


Potassium  


 


Chloride  


 


Carbon Dioxide  


 


Anion Gap  


 


BUN  


 


Creatinine  


 


Estimated GFR (MDRD)  


 


Glucose  


 


Lactic Acid  


 


Calcium  


 


Total Bilirubin  


 


AST  


 


ALT  


 


Alkaline Phosphatase  


 


Total Protein  


 


Albumin  


 


Globulin  


 


Albumin/Globulin Ratio  


 


Procalcitonin  < 0.05 


 


Urine Color   RED/BLOODY


 


Urine Clarity   TURBID


 


Urine pH   6.5


 


Ur Specific Gravity   1.025


 


Urine Protein   >=300 H


 


Urine Glucose (UA)   NEGATIVE


 


Urine Ketones   TRACE


 


Urine Occult Blood   LARGE H


 


Urine Nitrite   POSITIVE H


 


Urine Bilirubin   NEGATIVE


 


Urine Urobilinogen   1 (NORMAL)


 


Ur Leukocyte Esterase   TRACE H


 


Urine RBC   TNTC H


 


Urine WBC   4-5


 


Ur Squamous Epith Cells   NONE SEEN


 


Amorphous Sediment   Moderate


 


Urine Bacteria   Few


 


Ur Microscopic Review   INDICATED


 


Urine Culture Comments   INDICATED














PD Medical Decision Making





- ED course


Complexity details: reviewed results, re-evaluated patient, d/w patient, d/w 

family


ED course: 





78-year-old female who has a known history of recurrent urinary tract infections

as well as advancing dementia return to the emergency department today for 

concerns of worsening clinical status as well as reportedly positive blood 

cultures.  Was seen in this emergency department 2 days ago for generalized 

weakness and concern of strokelike symptoms.  At that time UA was thought to be 

suggestive of urinary tract infection and patient was started on cefpodoxime.  A

CT of the abdomen showed no acute surgical findings though bladder diverticula 

were noted.





Since discharge home, despite being on the cefpodoxime, his wife has reported 

that he has had a fever up to 102.4 just prior to EMS arrival.  Subsequently b

lood cultures obtained from the 5/29/2023 visit resulted positive for Staph 

epidermidis in one of the 2 sets.  This is likely a clinical contaminant. Blood 

cultures x2 are pending again today.  Though clinically I do not suspect 

bacteremia





On presentation to the emergency department today the patient is confused but 

reportedly more so than baseline.  He does have known dementia.  No focal 

deficits.  He was afebrile here and were on arrival to the emergency department.

 He had mild tachycardia with a resting heart rate just over 100.  No 

hypotension.





We did obtain a CBC, electrolytes, lactic acid and procalcitonin.  His white 

count is marginally elevated from 48 hours ago now 13.7.  No worrisome anemia.  

His lactate is not elevated.  His procalcitonin is negative.  A chest x-ray per 

my interpretation showed no acute focal findings to suggest pneumonia.  

Urinalysis completed today would be worrisome for infection.  Culture is pending

however culture obtained 2 days ago did not grow anything.





His abdominal exam is rather benign and given the recent CT findings without 

acute findings I have deferred CT imaging today but clinically the patient 

presents as sepsis with fever, leukocytosis and tachycardia.  Unknown source.  

He will be started on broad-spectrum antibiotics and I have initiated vancomycin

and cefepime.  We will contact the telehospitalist for admission and further nitish

luation and management of this patient





1935: I spoke on the phone with the patient's wife Sarai.  She indicates to me 

the patient should be a comfort measures DNR with no CPR or intubation.  She 

would like the patient admitted to the hospital for antibiotics but no other 

heroic measures would be indicated.  She tells me the new POLST form was signed 

today.  She is planning to have the patient admitted to a dementia/memory care 

unit shortly and the new memory care unit has this information.





1950: I have spoken with tele-hospitalist Dr. hendrix who agrees to admit pt for

further management of sepsis





- Sepsis Event


Sepsis Onset Date: 05/31/23


Sepsis Onset Time: 19:00


Current Stage of Sepsis: Sepsis


Initial Hypotension: Not hypotensive


Possible source of Sepsis: Unknown


Mental/Cognitive Status: Confused, Change from baseline


Reason for not giving 30ml/kg crystalloid fluids: Not in septic shock


Capillary refill: Less than 2 seconds


Peripheral Pulse Strength: 2+ Slightly Diminished


Peripheral Pulse Location: Pedal


Bedside ultrasound performed: No





Departure





- Departure


Disposition: [facility] DC/Xfer


Clinical Impression: 


 History of dementia





Sepsis


Qualifiers:


 Sepsis type: sepsis due to unspecified organism Sepsis acute organ dysfunction 

status: without acute organ dysfunction Qualified Code(s): A41.9 - Sepsis, 

unspecified organism

## 2023-06-01 LAB
ALBUMIN DIAFP-MCNC: 2.7 G/DL (ref 3.2–5.5)
ALBUMIN/GLOB SERPL: 1 {RATIO} (ref 1–2.2)
ALP SERPL-CCNC: 40 IU/L (ref 42–121)
ALT SERPL W P-5'-P-CCNC: < 10 IU/L (ref 10–60)
ANION GAP SERPL CALCULATED.4IONS-SCNC: 4 MMOL/L (ref 6–13)
AST SERPL W P-5'-P-CCNC: 12 IU/L (ref 10–42)
BILIRUB BLD-MCNC: 1.1 MG/DL (ref 0.2–1)
BUN SERPL-MCNC: 11 MG/DL (ref 6–20)
CALCIUM UR-MCNC: 7.9 MG/DL (ref 8.5–10.3)
CHLORIDE SERPL-SCNC: 112 MMOL/L (ref 101–111)
CO2 SERPL-SCNC: 24 MMOL/L (ref 21–32)
CREAT SERPLBLD-SCNC: 0.7 MG/DL (ref 0.6–1.2)
GFRSERPLBLD MDRD-ARVRAT: 109 ML/MIN/{1.73_M2} (ref 89–?)
GLOBULIN SER-MCNC: 2.8 G/DL (ref 2.1–4.2)
GLUCOSE SERPL-MCNC: 98 MG/DL (ref 70–100)
POTASSIUM SERPL-SCNC: 3.5 MMOL/L (ref 3.5–5)
PROT SPEC-MCNC: 5.5 G/DL (ref 6.7–8.2)
SODIUM SERPLBLD-SCNC: 140 MMOL/L (ref 135–145)

## 2023-06-01 RX ADMIN — SODIUM CHLORIDE SCH MLS/HR: 900 INJECTION INTRAVENOUS at 11:15

## 2023-06-01 RX ADMIN — TAMSULOSIN HYDROCHLORIDE SCH MG: 0.4 CAPSULE ORAL at 08:10

## 2023-06-01 RX ADMIN — SODIUM CHLORIDE SCH MLS/HR: 9 INJECTION, SOLUTION INTRAVENOUS at 06:01

## 2023-06-01 RX ADMIN — SODIUM CHLORIDE, PRESERVATIVE FREE SCH: 5 INJECTION INTRAVENOUS at 01:29

## 2023-06-01 RX ADMIN — ACETAMINOPHEN PRN MG: 325 TABLET ORAL at 00:14

## 2023-06-01 RX ADMIN — SODIUM CHLORIDE, PRESERVATIVE FREE SCH: 5 INJECTION INTRAVENOUS at 08:10

## 2023-06-01 RX ADMIN — APIXABAN SCH MG: 5 TABLET, FILM COATED ORAL at 08:10

## 2023-06-01 RX ADMIN — ACETAMINOPHEN PRN MG: 325 TABLET ORAL at 20:13

## 2023-06-01 RX ADMIN — FINASTERIDE SCH MG: 5 TABLET, FILM COATED ORAL at 08:10

## 2023-06-01 RX ADMIN — SODIUM CHLORIDE, PRESERVATIVE FREE SCH ML: 5 INJECTION INTRAVENOUS at 16:26

## 2023-06-01 RX ADMIN — SODIUM CHLORIDE SCH MLS/HR: 9 INJECTION, SOLUTION INTRAVENOUS at 16:26

## 2023-06-01 RX ADMIN — APIXABAN SCH MG: 5 TABLET, FILM COATED ORAL at 20:14

## 2023-06-01 NOTE — PROVIDER PROGRESS NOTE
Assessment/Plan





- Problem List


(1) Sepsis


Qualifiers: 


   Sepsis type: sepsis due to unspecified organism   Sepsis acute organ 

dysfunction status: without acute organ dysfunction   Qualified Code(s): A41.9 -

Sepsis, unspecified organism   


Assessment/Plan: 


Patient had criteria for sepsis: White blood count 13.7, fever of 38.0 at m

idnight, tachycardic with heart rate of 100 at rest


Plan:


Continue with IVF 


I will order empiric ceftriaxone IV daily


Await blood culture and new Urine culture results 








(2) UTI (urinary tract infection)


Conclusion/Plan: 


Plan:


I will order empiric ceftriaxone IV daily


Await blood culture and new Urine culture results 


Qualifiers: 


   Urinary tract infection type: acute cystitis   Hematuria presence: without 

hematuria   Qualified Code(s): N30.00 - Acute cystitis without hematuria  





 


(3) History of dementia


Conclusion/Plan: 


Plan:


Supportive care and continue home meds, once list is reconciled by pharmacy


Will order PT and OT eval and treat











- Current Meds


Current Meds: 





                               Current Medications











Generic Name Dose Route Start Last Admin





  Trade Name Freq  PRN Reason Stop Dose Admin


 


Acetaminophen  650 mg  05/31/23 19:45  06/01/23 00:14





  Acetaminophen 325 Mg Tablet  PO   650 mg





  Q4HR PRN   Administration





  Pain 1 to 4, or Fever  


 


Apixaban  5 mg  06/01/23 09:00  06/01/23 08:10





  Apixaban 5 Mg Tablet  PO   5 mg





  BID SEMAJ   Administration


 


Finasteride  5 mg  06/01/23 09:00  06/01/23 08:10





  Finasteride 5 Mg Tablet  PO   5 mg





  DAILY SEMAJ   Administration


 


Sodium Chloride  1,000 mls @ 100 mls/hr  05/31/23 20:00  06/01/23 06:01





  Normal Saline 0.9%  IV   100 mls/hr





  .Q10H SEMAJ   Administration


 


Ceftriaxone Sodium 2 gm/  100 mls @ 200 mls/hr  06/01/23 11:00  06/01/23 12:02





  Sodium Chloride  IV   Infused





  DAILY SEMAJ   Infusion


 


Sodium Chloride  10 ml  06/01/23 01:00  06/01/23 08:10





  Sodium Chloride Flush 0.9% 10 Ml Syringe  IVP   Not Given





  0100,0900,1700 SEMAJ  


 


Tamsulosin HCl  0.4 mg  06/01/23 09:00  06/01/23 08:10





  Tamsulosin 0.4 Mg Capsule  PO   0.4 mg





  DAILY SEMAJ   Administration














- Lab Result


Fish Bone Diagrams: 


                                 05/31/23 18:25





                                 06/01/23 04:35





- Additional Planning


My Orders: 





My Active Orders





06/01/23


Evaluate and Treat OT [OT] Routine 


Evaluate and Treat PT [PT] Routine 





06/01/23 11:00


cefTRIAXone [Rocephin] 2 gm   Sodium Chloride 0.9% Minibag [Normal Saline 0.9% 

Minibag] 100 ml IV DAILY 














Subjective





- Subjective


Nursing Reports: Other (He appears comfortable.  He is laying in bed with head 

of bed elevated, he was able to feed himself today. PT and OT told me he was 

able to stand but has poor balance and high risk of falls and yet has strong 

muscles so he is hard to redirect)





Objective


Vital Signs: 





                               Vital Signs - 24 hr











  05/31/23 05/31/23 05/31/23





  18:18 20:00 20:45


 


Temperature 36.9 C  37.5 C


 


Heart Rate 100 98 


 


Heart Rate [   97





Brachial]   


 


Respiratory 18  16





Rate   


 


Blood Pressure 160/97 H 156/85 H 


 


Blood Pressure   142/86 H





[Right Brachial   





artery]   


 


O2 Saturation 97 100 100














  06/01/23 06/01/23 06/01/23





  00:00 01:24 07:51


 


Temperature 38.0 C H 37.8 C 36.6 C


 


Heart Rate   


 


Heart Rate [ 87  90





Brachial]   


 


Respiratory 20  18





Rate   


 


Blood Pressure   


 


Blood Pressure 147/66 H  135/76 H





[Right Brachial   





artery]   


 


O2 Saturation 96  99








                                     Oxygen











O2 Source [With Activity]      Room air


 


O2 Source                      Room air














I&O (Last 24 Hrs): 





                          Intake and Output Totals x24h











 05/30/23 05/31/23 06/01/23





 23:59 23:59 23:59


 


Intake Total  1600 1475


 


Balance  1600 1475











General: Alert


HEENT: Mucous membr. moist/pink


Neck: Supple, No JVD


Neuro: Alert, Disoriented, Non Focal


Cardiovascular: Regular rate


Respiratory: No respiratory distress


Abdomen: Soft


Extremities: No clubbing, No edema





- Results


Results: 





                               Laboratory Results











WBC  13.7 x10^3/uL (4.8-10.8)  H  05/31/23  18:25    


 


RBC  4.03 10^6/uL (4.70-6.10)  L  05/31/23  18:25    


 


Hgb  13.0 g/dL (14.0-18.0)  L  05/31/23  18:25    


 


Hct  40.6 % (42.0-52.0)  L  05/31/23  18:25    


 


MCV  100.7 fL (80.0-94.0)  H  05/31/23  18:25    


 


MCH  32.3 pg (27.0-31.0)  H  05/31/23  18:25    


 


MCHC  32.0 g/dL (32.0-36.0)   05/31/23  18:25    


 


RDW  13.7 % (12.0-15.0)   05/31/23  18:25    


 


Plt Count  288 10^3/uL (130-450)   05/31/23  18:25    


 


MPV  11.2 fL (7.4-11.4)   05/31/23  18:25    


 


Neut # (Auto)  Not Reportable   05/31/23  18:25    


 


Lymph # (Auto)  Not Reportable   05/31/23  18:25    


 


Mono # (Auto)  Not Reportable   05/31/23  18:25    


 


Eos # (Auto)  Not Reportable   05/31/23  18:25    


 


Baso # (Auto)  Not Reportable   05/31/23  18:25    


 


Absolute Nucleated RBC  Not Reportable   05/31/23  18:25    


 


Total Counted  100   05/31/23  18:25    


 


Band Neuts % (Manual)  0 % (0-10)  05/31/23  18:25    


 


Abnorm Lymph % (Manual)  0 %  05/31/23  18:25    


 


Nucleated RBC %  Not Reportable   05/31/23  18:25    


 


Neutrophils # (Manual)  11.0 10^3/uL (1.5-6.6)  H  05/31/23  18:25    


 


Lymphocytes # (Manual)  1.4 10^3/uL (1.5-3.5)  L  05/31/23  18:25    


 


Monocytes # (Manual)  1.1 10^3/uL (0.0-1.0)  H  05/31/23  18:25    


 


Eosinophils # (Manual)  0.0 10^3/uL (0-0.7)   05/31/23  18:25    


 


Basophils # (Manual)  0.3 10^3/uL (0-0.1)  H  05/31/23  18:25    


 


Differential Comment  MANUAL DIFFERENTIAL   05/31/23  18:25    


 


Platelet Estimate  NORMAL (130-450,000)  (NORMAL)   05/31/23  18:25    


 


Platelet Morphology  NORMAL APPEARANCE  (NORMAL)   05/31/23  18:25    


 


RBC Morph Micro Appear  1+ MACROCYTOSIS  (NORMAL)   05/31/23  18:25    


 


Sodium  140 mmol/L (135-145)   06/01/23  04:35    


 


Potassium  3.5 mmol/L (3.5-5.0)   06/01/23  04:35    


 


Chloride  112 mmol/L (101-111)  H  06/01/23  04:35    


 


Carbon Dioxide  24 mmol/L (21-32)   06/01/23  04:35    


 


Anion Gap  4.0  (6-13)  L  06/01/23  04:35    


 


BUN  11 mg/dL (6-20)   06/01/23  04:35    


 


Creatinine  0.7 mg/dL (0.6-1.2)   06/01/23  04:35    


 


Estimated GFR (MDRD)  109  (>89)   06/01/23  04:35    


 


Glucose  98 mg/dL ()   06/01/23  04:35    


 


Lactic Acid  1.6 mmol/L (0.5-2.2)   05/31/23  18:25    


 


Calcium  7.9 mg/dL (8.5-10.3)  L  06/01/23  04:35    


 


Total Bilirubin  1.1 mg/dL (0.2-1.0)  H  06/01/23  04:35    


 


AST  12 IU/L (10-42)   06/01/23  04:35    


 


ALT  < 10 IU/L (10-60)  L  06/01/23  04:35    


 


Alkaline Phosphatase  40 IU/L ()  L  06/01/23  04:35    


 


Total Protein  5.5 g/dL (6.7-8.2)  L  06/01/23  04:35    


 


Albumin  2.7 g/dL (3.2-5.5)  L  06/01/23  04:35    


 


Globulin  2.8 g/dL (2.1-4.2)   06/01/23  04:35    


 


Albumin/Globulin Ratio  1.0  (1.0-2.2)   06/01/23  04:35    


 


Procalcitonin  < 0.05 ng/mL (<0.5)   05/31/23  18:25    


 


Urine Color  RED/BLOODY   05/31/23  18:40    


 


Urine Clarity  TURBID  (CLEAR)   05/31/23  18:40    


 


Urine pH  6.5 PH (5.0-7.5)   05/31/23  18:40    


 


Ur Specific Gravity  1.025  (1.002-1.030)   05/31/23  18:40    


 


Urine Protein  >=300 mg/dL (NEGATIVE)  H  05/31/23  18:40    


 


Urine Glucose (UA)  NEGATIVE mg/dL (NEGATIVE)   05/31/23  18:40    


 


Urine Ketones  TRACE mg/dL (NEGATIVE)   05/31/23  18:40    


 


Urine Occult Blood  LARGE  (NEGATIVE)  H  05/31/23  18:40    


 


Urine Nitrite  POSITIVE  (NEGATIVE)  H  05/31/23  18:40    


 


Urine Bilirubin  NEGATIVE  (NEGATIVE)   05/31/23  18:40    


 


Urine Urobilinogen  1 (NORMAL) E.U./dL (NORMAL)   05/31/23  18:40    


 


Ur Leukocyte Esterase  TRACE  (NEGATIVE)  H  05/31/23  18:40    


 


Urine RBC  TNTC /HPF (0-5)  H  05/31/23  18:40    


 


Urine WBC  4-5 /HPF (0-3)   05/31/23  18:40    


 


Ur Squamous Epith Cells  NONE SEEN  (<= Few)   05/31/23  18:40    


 


Amorphous Sediment  Moderate /LPF  05/31/23  18:40    


 


Urine Bacteria  Few /HPF (None Seen)   05/31/23  18:40    


 


Ur Microscopic Review  INDICATED   05/31/23  18:40    


 


Urine Culture Comments  INDICATED   05/31/23  18:40    


 


Nasal Adenovirus (PCR)  NOT DETECTED   05/31/23  20:15    


 


Nasal B. parapertussis DNA (PCR)  NOT DETECTED   05/31/23  20:15    


 


Nasal Coronavir 229E PCR  NOT DETECTED   05/31/23  20:15    


 


Nasal Coronavir HKU1 PCR  NOT DETECTED   05/31/23  20:15    


 


Nasal Coronavir NL63 PCR  NOT DETECTED   05/31/23  20:15    


 


Nasal Coronavir OC43 PCR  NOT DETECTED   05/31/23  20:15    


 


Nasal Enterovir/Rhinovir PCR  NOT DETECTED   05/31/23  20:15    


 


Nasal Influenza B PCR  NOT DETECTED   05/31/23  20:15    


 


Nasal Influenza A PCR  NOT DETECTED   05/31/23  20:15    


 


Nasal Parainfluen 1 PCR  NOT DETECTED   05/31/23  20:15    


 


Nasal Parainfluen 2 PCR  NOT DETECTED   05/31/23  20:15    


 


Nasal Parainfluen 3 PCR  NOT DETECTED   05/31/23  20:15    


 


Nasal Parainfluen 4 PCR  NOT DETECTED   05/31/23  20:15    


 


Nasal RSV (PCR)  NOT DETECTED   05/31/23  20:15    


 


Nasal B.pertussis DNA PCR  NOT DETECTED   05/31/23  20:15    


 


Nasal C.pneumoniae (PCR)  NOT DETECTED   05/31/23  20:15    


 


Pranav Human Metapneumo PCR  NOT DETECTED   05/31/23  20:15    


 


Nasal M.pneumoniae (PCR)  NOT DETECTED   05/31/23  20:15    


 


Nasal SARS-CoV-2 (PCR)  NOT DETECTED   05/31/23  20:15    














Sepsis Event Note (H)





- Evaluation


Current Stage of Sepsis: Sepsis


Possible source of Sepsis: positive: Unknown





- Sepsis Criteria


Sepsis Criteria: Recorded Heart Rate greater than 90 bpm, WBC count greater than

10% bands, WBC count greater than 12,000 or less than 4000

## 2023-06-01 NOTE — PHARMACY PROGRESS NOTE
- Best Possible Medication History


Admit Date and Time: 05/31/23 1945


Processed by: Pharmacy


Medication History completed: Yes


Patient Interview: Pt unable to participate


Secondary Source(s): Spouse/Significant other, Physician records, Pharmacy re

cords, Insurance records





As the person ultimately responsible for medication therapy, providers are able 

to order a medication from an existing home medication list in [facility] via the 

"Reconcile Routine" prior to Confirmation of that medication by support staff. 

Such practice is discouraged except when the physician, in their clinical 

judgment, deems that a medical need exists for a medication without regard to 

previous use.

## 2023-06-02 LAB
ALBUMIN DIAFP-MCNC: 2.7 G/DL (ref 3.2–5.5)
ALBUMIN/GLOB SERPL: 0.9 {RATIO} (ref 1–2.2)
ALP SERPL-CCNC: 46 IU/L (ref 42–121)
ALT SERPL W P-5'-P-CCNC: 13 IU/L (ref 10–60)
ANION GAP SERPL CALCULATED.4IONS-SCNC: 5 MMOL/L (ref 6–13)
AST SERPL W P-5'-P-CCNC: 17 IU/L (ref 10–42)
BASOPHILS NFR BLD AUTO: 0.1 10^3/UL (ref 0–0.1)
BASOPHILS NFR BLD AUTO: 0.7 %
BILIRUB BLD-MCNC: 0.7 MG/DL (ref 0.2–1)
BUN SERPL-MCNC: 9 MG/DL (ref 6–20)
CALCIUM UR-MCNC: 8 MG/DL (ref 8.5–10.3)
CHLORIDE SERPL-SCNC: 107 MMOL/L (ref 101–111)
CO2 SERPL-SCNC: 25 MMOL/L (ref 21–32)
CREAT SERPLBLD-SCNC: 0.7 MG/DL (ref 0.6–1.2)
EOSINOPHIL # BLD AUTO: 0.4 10^3/UL (ref 0–0.7)
EOSINOPHIL NFR BLD AUTO: 3.6 %
ERYTHROCYTE [DISTWIDTH] IN BLOOD BY AUTOMATED COUNT: 13.4 % (ref 12–15)
GFRSERPLBLD MDRD-ARVRAT: 109 ML/MIN/{1.73_M2} (ref 89–?)
GLOBULIN SER-MCNC: 3.1 G/DL (ref 2.1–4.2)
GLUCOSE SERPL-MCNC: 88 MG/DL (ref 70–100)
HCT VFR BLD AUTO: 36.2 % (ref 42–52)
HGB UR QL STRIP: 11.7 G/DL (ref 14–18)
LYMPHOCYTES # SPEC AUTO: 1.7 10^3/UL (ref 1.5–3.5)
LYMPHOCYTES NFR BLD AUTO: 17.6 %
MCH RBC QN AUTO: 32.3 PG (ref 27–31)
MCHC RBC AUTO-ENTMCNC: 32.3 G/DL (ref 32–36)
MCV RBC AUTO: 100 FL (ref 80–94)
MONOCYTES # BLD AUTO: 1.2 10^3/UL (ref 0–1)
MONOCYTES NFR BLD AUTO: 12.4 %
NEUTROPHILS # BLD AUTO: 6.5 10^3/UL (ref 1.5–6.6)
NEUTROPHILS # SNV AUTO: 9.8 X10^3/UL (ref 4.8–10.8)
NEUTROPHILS NFR BLD AUTO: 65.5 %
NRBC # BLD AUTO: 0 /100WBC
NRBC # BLD AUTO: 0 X10^3/UL
PDW BLD AUTO: 10.4 FL (ref 7.4–11.4)
PLATELET # BLD: 241 10^3/UL (ref 130–450)
POTASSIUM SERPL-SCNC: 3.3 MMOL/L (ref 3.5–5)
PROT SPEC-MCNC: 5.8 G/DL (ref 6.7–8.2)
RBC MAR: 3.62 10^6/UL (ref 4.7–6.1)
SODIUM SERPLBLD-SCNC: 137 MMOL/L (ref 135–145)

## 2023-06-02 RX ADMIN — OXYCODONE PRN MG: 5 TABLET ORAL at 10:02

## 2023-06-02 RX ADMIN — APIXABAN SCH MG: 5 TABLET, FILM COATED ORAL at 08:35

## 2023-06-02 RX ADMIN — TAMSULOSIN HYDROCHLORIDE SCH MG: 0.4 CAPSULE ORAL at 08:35

## 2023-06-02 RX ADMIN — TAMSULOSIN HYDROCHLORIDE SCH MG: 0.4 CAPSULE ORAL at 20:36

## 2023-06-02 RX ADMIN — SODIUM CHLORIDE, PRESERVATIVE FREE SCH: 5 INJECTION INTRAVENOUS at 00:54

## 2023-06-02 RX ADMIN — SODIUM CHLORIDE SCH MLS/HR: 9 INJECTION, SOLUTION INTRAVENOUS at 15:55

## 2023-06-02 RX ADMIN — OXYCODONE PRN MG: 5 TABLET ORAL at 20:36

## 2023-06-02 RX ADMIN — SODIUM CHLORIDE SCH MLS/HR: 9 INJECTION, SOLUTION INTRAVENOUS at 03:53

## 2023-06-02 RX ADMIN — SODIUM CHLORIDE, PRESERVATIVE FREE SCH: 5 INJECTION INTRAVENOUS at 18:09

## 2023-06-02 RX ADMIN — NYSTATIN SCH APPLIC: 100000 POWDER TOPICAL at 21:09

## 2023-06-02 RX ADMIN — SODIUM CHLORIDE SCH MLS/HR: 900 INJECTION INTRAVENOUS at 08:35

## 2023-06-02 RX ADMIN — SODIUM CHLORIDE, PRESERVATIVE FREE SCH: 5 INJECTION INTRAVENOUS at 08:36

## 2023-06-02 RX ADMIN — ACETAMINOPHEN PRN MG: 325 TABLET ORAL at 13:59

## 2023-06-02 RX ADMIN — ACETAMINOPHEN PRN MG: 325 TABLET ORAL at 08:34

## 2023-06-02 RX ADMIN — FINASTERIDE SCH MG: 5 TABLET, FILM COATED ORAL at 08:35

## 2023-06-02 RX ADMIN — APIXABAN SCH MG: 5 TABLET, FILM COATED ORAL at 20:37

## 2023-06-02 NOTE — PROVIDER PROGRESS NOTE
Assessment/Plan





- Problem List


(1) UTI (urinary tract infection)


Qualifiers: 


   Urinary tract infection type: acute cystitis   Hematuria presence: without 

hematuria   Qualified Code(s): N30.00 - Acute cystitis without hematuria   


Assessment/Plan: 


The admitting telemedicine doctor felt he had sepsis caused by a UTI.  He 

received Cefepime starting in the ED and was continued on empiric iv 

Ceftriaxone.


New urine culture final result is negative


Plan:


Continue empiric ceftriaxone IV daily


Await blood culture and new Urine culture results 


Check the cultures from the 2 day ago ER visit as well


 


(2) Acute urinary retention


Overnight telemedicine doctor was called because the patient had greater than 1 

L residual in his bladder on bladder scanning and Pineda catheter insertion was 

ordered


Plan:


Continue with Pineda


We will start any prostate meds that he is on, I am awaiting the med list to be 

reconciled





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


The patient needs to person cyst to stand or move, he thinks he is falling, he 

did not know what to do with his utensils, to feed himself


Plan:


Supportive care and continue home meds, once list is reconciled by pharmacy


PT and OT evaluated and then treat if indicated


The wife said he is going to Memory Care soon. Based on what we see that he 

needs with caregiving help here, he should go straight to memory care in my 

opinion





(4) Sepsis


Assessment/Plan: 


RESOLVED


Patient had criteria for sepsis: White blood count 13.7, fever of 38.0 at 

midnight, tachycardic with heart rate of 100 at rest


Plan:


Continue with IVF and iv antibx











- Current Meds


Current Meds: 





                               Current Medications











Generic Name Dose Route Start Last Admin





  Trade Name Mary Jo  PRN Reason Stop Dose Admin


 


Acetaminophen  650 mg  05/31/23 19:45  06/02/23 13:59





  Acetaminophen 325 Mg Tablet  PO   650 mg





  Q4HR PRN   Administration





  Pain 1 to 4, or Fever  


 


Apixaban  5 mg  06/01/23 09:00  06/02/23 08:35





  Apixaban 5 Mg Tablet  PO   5 mg





  BID SEMAJ   Administration


 


Finasteride  5 mg  06/01/23 09:00  06/02/23 08:35





  Finasteride 5 Mg Tablet  PO   5 mg





  DAILY SEMAJ   Administration


 


Sodium Chloride  1,000 mls @ 100 mls/hr  05/31/23 20:00  06/02/23 15:55





  Normal Saline 0.9%  IV   100 mls/hr





  .Q10H SEMAJ   Administration


 


Ceftriaxone Sodium 2 gm/  100 mls @ 200 mls/hr  06/01/23 11:00  06/02/23 09:15





  Sodium Chloride  IV   Infused





  DAILY SEMAJ   Infusion


 


Multi-Ingredient Ointment  1 applic  06/01/23 21:11  06/02/23 08:35





  Zinc Oxide 20% Oint 30 Gm Tube  TOP   1 applic





  PRN PRN   Administration





  Skin Care  


 


Oxycodone HCl  5 mg  05/31/23 19:44  06/02/23 10:02





  Oxycodone 5 Mg Tablet  PO   5 mg





  Q6H PRN   Administration





  PAIN  


 


Sodium Chloride  10 ml  06/01/23 01:00  06/02/23 08:36





  Sodium Chloride Flush 0.9% 10 Ml Syringe  IVP   Not Given





  0100,0900,1700 SEMAJ  


 


Tamsulosin HCl  0.4 mg  06/01/23 09:00  06/02/23 08:35





  Tamsulosin 0.4 Mg Capsule  PO   0.4 mg





  DAILY SEMAJ   Administration














- Lab Result


Fish Bone Diagrams: 


                                 06/02/23 05:00





                                 06/02/23 05:30





- Additional Planning


My Orders: 





My Active Orders





06/01/23 21:11


Zinc Oxide 20% Oint [Zinc Oxide]   1 applic TOP PRN PRN 





06/02/23 10:10


Miscellaenous Nursing Order [RC] QSHIFT 














Subjective





- Subjective


Patient Reports: Other (He looks around, mostly is asleep)





Objective


Vital Signs: 





                               Vital Signs - 24 hr











  06/01/23 06/02/23 06/02/23





  19:53 00:15 09:38


 


Temperature  36.9 C 36.8 C


 


Heart Rate [  70 80





Brachial]   


 


Respiratory  12 16





Rate   


 


Blood Pressure 136/75 H 133/75 H 150/73 H





[Right Brachial   





artery]   


 


O2 Saturation  100 97














  06/02/23





  16:00


 


Temperature 36.7 C


 


Heart Rate [ 88





Brachial] 


 


Respiratory 18





Rate 


 


Blood Pressure 169/101 H





[Right Brachial 





artery] 


 


O2 Saturation 100








                                     Oxygen











O2 Source [With Activity]      Room air


 


O2 Source                      Room air














I&O (Last 24 Hrs): 





                          Intake and Output Totals x24h











 05/31/23 06/01/23 06/02/23





 23:59 23:59 23:59


 


Intake Total 1600 3165 3460


 


Output Total  1250 2100


 


Balance 1600 1915 1360











General: No acute distress, Other (Pale)


HEENT: Mucous membr. moist/pink


Neck: Supple, No JVD


Neuro: Alert, Disoriented


Cardiovascular: No murmurs


Respiratory: No respiratory distress


Abdomen: Soft, No tenderness


Extremities: No clubbing, No edema, No tenderness/swelling





- Results


Results: 





                               Laboratory Results











WBC  9.8 x10^3/uL (4.8-10.8)   06/02/23  05:00    


 


RBC  3.62 10^6/uL (4.70-6.10)  L  06/02/23  05:00    


 


Hgb  11.7 g/dL (14.0-18.0)  L  06/02/23  05:00    


 


Hct  36.2 % (42.0-52.0)  L  06/02/23  05:00    


 


MCV  100.0 fL (80.0-94.0)  H  06/02/23  05:00    


 


MCH  32.3 pg (27.0-31.0)  H  06/02/23  05:00    


 


MCHC  32.3 g/dL (32.0-36.0)   06/02/23  05:00    


 


RDW  13.4 % (12.0-15.0)   06/02/23  05:00    


 


Plt Count  241 10^3/uL (130-450)   06/02/23  05:00    


 


MPV  10.4 fL (7.4-11.4)   06/02/23  05:00    


 


Neut # (Auto)  6.5 10^3/uL (1.5-6.6)   06/02/23  05:00    


 


Lymph # (Auto)  1.7 10^3/uL (1.5-3.5)   06/02/23  05:00    


 


Mono # (Auto)  1.2 10^3/uL (0.0-1.0)  H  06/02/23  05:00    


 


Eos # (Auto)  0.4 10^3/uL (0.0-0.7)   06/02/23  05:00    


 


Baso # (Auto)  0.1 10^3/uL (0.0-0.1)   06/02/23  05:00    


 


Absolute Nucleated RBC  0.00 x10^3/uL  06/02/23  05:00    


 


Total Counted  100   05/31/23  18:25    


 


Band Neuts % (Manual)  0 % (0-10)  05/31/23  18:25    


 


Abnorm Lymph % (Manual)  0 %  05/31/23  18:25    


 


Nucleated RBC %  0.0 /100WBC  06/02/23  05:00    


 


Neutrophils # (Manual)  11.0 10^3/uL (1.5-6.6)  H  05/31/23  18:25    


 


Lymphocytes # (Manual)  1.4 10^3/uL (1.5-3.5)  L  05/31/23  18:25    


 


Monocytes # (Manual)  1.1 10^3/uL (0.0-1.0)  H  05/31/23  18:25    


 


Eosinophils # (Manual)  0.0 10^3/uL (0-0.7)   05/31/23  18:25    


 


Basophils # (Manual)  0.3 10^3/uL (0-0.1)  H  05/31/23  18:25    


 


Differential Comment  MANUAL DIFFERENTIAL   05/31/23  18:25    


 


Platelet Estimate  NORMAL (130-450,000)  (NORMAL)   05/31/23  18:25    


 


Platelet Morphology  NORMAL APPEARANCE  (NORMAL)   05/31/23  18:25    


 


RBC Morph Micro Appear  1+ MACROCYTOSIS  (NORMAL)   05/31/23  18:25    


 


Sodium  137 mmol/L (135-145)   06/02/23  05:30    


 


Potassium  3.3 mmol/L (3.5-5.0)  L  06/02/23  05:30    


 


Chloride  107 mmol/L (101-111)   06/02/23  05:30    


 


Carbon Dioxide  25 mmol/L (21-32)   06/02/23  05:30    


 


Anion Gap  5.0  (6-13)  L  06/02/23  05:30    


 


BUN  9 mg/dL (6-20)   06/02/23  05:30    


 


Creatinine  0.7 mg/dL (0.6-1.2)   06/02/23  05:30    


 


Estimated GFR (MDRD)  109  (>89)   06/02/23  05:30    


 


Glucose  88 mg/dL ()   06/02/23  05:30    


 


Lactic Acid  1.6 mmol/L (0.5-2.2)   05/31/23  18:25    


 


Calcium  8.0 mg/dL (8.5-10.3)  L  06/02/23  05:30    


 


Total Bilirubin  0.7 mg/dL (0.2-1.0)   06/02/23  05:30    


 


AST  17 IU/L (10-42)   06/02/23  05:30    


 


ALT  13 IU/L (10-60)   06/02/23  05:30    


 


Alkaline Phosphatase  46 IU/L ()   06/02/23  05:30    


 


Total Protein  5.8 g/dL (6.7-8.2)  L  06/02/23  05:30    


 


Albumin  2.7 g/dL (3.2-5.5)  L  06/02/23  05:30    


 


Globulin  3.1 g/dL (2.1-4.2)   06/02/23  05:30    


 


Albumin/Globulin Ratio  0.9  (1.0-2.2)  L  06/02/23  05:30    


 


Procalcitonin  < 0.05 ng/mL (<0.5)   05/31/23  18:25    


 


Urine Color  RED/BLOODY   05/31/23  18:40    


 


Urine Clarity  TURBID  (CLEAR)   05/31/23  18:40    


 


Urine pH  6.5 PH (5.0-7.5)   05/31/23  18:40    


 


Ur Specific Gravity  1.025  (1.002-1.030)   05/31/23  18:40    


 


Urine Protein  >=300 mg/dL (NEGATIVE)  H  05/31/23  18:40    


 


Urine Glucose (UA)  NEGATIVE mg/dL (NEGATIVE)   05/31/23  18:40    


 


Urine Ketones  TRACE mg/dL (NEGATIVE)   05/31/23  18:40    


 


Urine Occult Blood  LARGE  (NEGATIVE)  H  05/31/23  18:40    


 


Urine Nitrite  POSITIVE  (NEGATIVE)  H  05/31/23  18:40    


 


Urine Bilirubin  NEGATIVE  (NEGATIVE)   05/31/23  18:40    


 


Urine Urobilinogen  1 (NORMAL) E.U./dL (NORMAL)   05/31/23  18:40    


 


Ur Leukocyte Esterase  TRACE  (NEGATIVE)  H  05/31/23  18:40    


 


Urine RBC  TNTC /HPF (0-5)  H  05/31/23  18:40    


 


Urine WBC  4-5 /HPF (0-3)   05/31/23  18:40    


 


Ur Squamous Epith Cells  NONE SEEN  (<= Few)   05/31/23  18:40    


 


Amorphous Sediment  Moderate /LPF  05/31/23  18:40    


 


Urine Bacteria  Few /HPF (None Seen)   05/31/23  18:40    


 


Ur Microscopic Review  INDICATED   05/31/23  18:40    


 


Urine Culture Comments  INDICATED   05/31/23  18:40    


 


Nasal Adenovirus (PCR)  NOT DETECTED   05/31/23  20:15    


 


Nasal B. parapertussis DNA (PCR)  NOT DETECTED   05/31/23  20:15    


 


Nasal Coronavir 229E PCR  NOT DETECTED   05/31/23  20:15    


 


Nasal Coronavir HKU1 PCR  NOT DETECTED   05/31/23  20:15    


 


Nasal Coronavir NL63 PCR  NOT DETECTED   05/31/23  20:15    


 


Nasal Coronavir OC43 PCR  NOT DETECTED   05/31/23  20:15    


 


Nasal Enterovir/Rhinovir PCR  NOT DETECTED   05/31/23  20:15    


 


Nasal Influenza B PCR  NOT DETECTED   05/31/23  20:15    


 


Nasal Influenza A PCR  NOT DETECTED   05/31/23  20:15    


 


Nasal Parainfluen 1 PCR  NOT DETECTED   05/31/23  20:15    


 


Nasal Parainfluen 2 PCR  NOT DETECTED   05/31/23  20:15    


 


Nasal Parainfluen 3 PCR  NOT DETECTED   05/31/23  20:15    


 


Nasal Parainfluen 4 PCR  NOT DETECTED   05/31/23  20:15    


 


Nasal RSV (PCR)  NOT DETECTED   05/31/23  20:15    


 


Nasal B.pertussis DNA PCR  NOT DETECTED   05/31/23  20:15    


 


Nasal C.pneumoniae (PCR)  NOT DETECTED   05/31/23  20:15    


 


Pranav Human Metapneumo PCR  NOT DETECTED   05/31/23  20:15    


 


Nasal M.pneumoniae (PCR)  NOT DETECTED   05/31/23  20:15    


 


Nasal SARS-CoV-2 (PCR)  NOT DETECTED   05/31/23  20:15    














Sepsis Event Note (H)





- Evaluation


Current Stage of Sepsis: Sepsis


Possible source of Sepsis: positive: Unknown





- Sepsis Criteria


Sepsis Criteria: Recorded Heart Rate greater than 90 bpm, WBC count greater than

10% bands, WBC count greater than 12,000 or less than 4000

## 2023-06-03 LAB
ALBUMIN DIAFP-MCNC: 2.7 G/DL (ref 3.2–5.5)
ALBUMIN/GLOB SERPL: 0.8 {RATIO} (ref 1–2.2)
ALP SERPL-CCNC: 42 IU/L (ref 42–121)
ALT SERPL W P-5'-P-CCNC: 12 IU/L (ref 10–60)
ANION GAP SERPL CALCULATED.4IONS-SCNC: 5 MMOL/L (ref 6–13)
AST SERPL W P-5'-P-CCNC: 14 IU/L (ref 10–42)
BASOPHILS NFR BLD AUTO: 0.1 10^3/UL (ref 0–0.1)
BASOPHILS NFR BLD AUTO: 0.6 %
BILIRUB BLD-MCNC: 0.5 MG/DL (ref 0.2–1)
BUN SERPL-MCNC: 7 MG/DL (ref 6–20)
CALCIUM UR-MCNC: 8.6 MG/DL (ref 8.5–10.3)
CHLORIDE SERPL-SCNC: 110 MMOL/L (ref 101–111)
CO2 SERPL-SCNC: 29 MMOL/L (ref 21–32)
CREAT SERPLBLD-SCNC: 0.7 MG/DL (ref 0.6–1.2)
EOSINOPHIL # BLD AUTO: 0.6 10^3/UL (ref 0–0.7)
EOSINOPHIL NFR BLD AUTO: 5.4 %
ERYTHROCYTE [DISTWIDTH] IN BLOOD BY AUTOMATED COUNT: 13.3 % (ref 12–15)
GFRSERPLBLD MDRD-ARVRAT: 109 ML/MIN/{1.73_M2} (ref 89–?)
GLOBULIN SER-MCNC: 3.5 G/DL (ref 2.1–4.2)
GLUCOSE SERPL-MCNC: 83 MG/DL (ref 70–100)
HCT VFR BLD AUTO: 36.7 % (ref 42–52)
HGB UR QL STRIP: 12 G/DL (ref 14–18)
LYMPHOCYTES # SPEC AUTO: 2.1 10^3/UL (ref 1.5–3.5)
LYMPHOCYTES NFR BLD AUTO: 18.1 %
MCH RBC QN AUTO: 32.2 PG (ref 27–31)
MCHC RBC AUTO-ENTMCNC: 32.7 G/DL (ref 32–36)
MCV RBC AUTO: 98.4 FL (ref 80–94)
MONOCYTES # BLD AUTO: 1.2 10^3/UL (ref 0–1)
MONOCYTES NFR BLD AUTO: 10.2 %
NEUTROPHILS # BLD AUTO: 7.5 10^3/UL (ref 1.5–6.6)
NEUTROPHILS # SNV AUTO: 11.4 X10^3/UL (ref 4.8–10.8)
NEUTROPHILS NFR BLD AUTO: 65.3 %
NRBC # BLD AUTO: 0 /100WBC
NRBC # BLD AUTO: 0 X10^3/UL
PDW BLD AUTO: 10.2 FL (ref 7.4–11.4)
PLATELET # BLD: 296 10^3/UL (ref 130–450)
POTASSIUM SERPL-SCNC: 4.1 MMOL/L (ref 3.5–5)
PROT SPEC-MCNC: 6.2 G/DL (ref 6.7–8.2)
RBC MAR: 3.73 10^6/UL (ref 4.7–6.1)
SODIUM SERPLBLD-SCNC: 144 MMOL/L (ref 135–145)

## 2023-06-03 RX ADMIN — SODIUM CHLORIDE, PRESERVATIVE FREE SCH ML: 5 INJECTION INTRAVENOUS at 10:37

## 2023-06-03 RX ADMIN — OXYCODONE PRN MG: 5 TABLET ORAL at 07:49

## 2023-06-03 RX ADMIN — SODIUM CHLORIDE SCH MLS/HR: 900 INJECTION INTRAVENOUS at 08:29

## 2023-06-03 RX ADMIN — SODIUM CHLORIDE SCH MLS/HR: 9 INJECTION, SOLUTION INTRAVENOUS at 03:19

## 2023-06-03 RX ADMIN — APIXABAN SCH MG: 5 TABLET, FILM COATED ORAL at 19:56

## 2023-06-03 RX ADMIN — SODIUM CHLORIDE, PRESERVATIVE FREE SCH ML: 5 INJECTION INTRAVENOUS at 19:57

## 2023-06-03 RX ADMIN — TAMSULOSIN HYDROCHLORIDE SCH MG: 0.4 CAPSULE ORAL at 19:56

## 2023-06-03 RX ADMIN — NYSTATIN SCH APPLIC: 100000 POWDER TOPICAL at 10:40

## 2023-06-03 RX ADMIN — FINASTERIDE SCH MG: 5 TABLET, FILM COATED ORAL at 08:29

## 2023-06-03 RX ADMIN — APIXABAN SCH MG: 5 TABLET, FILM COATED ORAL at 08:29

## 2023-06-03 RX ADMIN — ACETAMINOPHEN PRN MG: 325 TABLET ORAL at 19:56

## 2023-06-03 RX ADMIN — SODIUM CHLORIDE, PRESERVATIVE FREE SCH: 5 INJECTION INTRAVENOUS at 00:22

## 2023-06-03 RX ADMIN — TAMSULOSIN HYDROCHLORIDE SCH MG: 0.4 CAPSULE ORAL at 08:29

## 2023-06-03 RX ADMIN — OXYCODONE PRN MG: 5 TABLET ORAL at 14:37

## 2023-06-03 RX ADMIN — SODIUM CHLORIDE, PRESERVATIVE FREE SCH: 5 INJECTION INTRAVENOUS at 18:57

## 2023-06-03 RX ADMIN — SODIUM CHLORIDE, PRESERVATIVE FREE SCH ML: 5 INJECTION INTRAVENOUS at 17:28

## 2023-06-03 RX ADMIN — NYSTATIN SCH APPLIC: 100000 POWDER TOPICAL at 19:57

## 2023-06-03 NOTE — PROVIDER PROGRESS NOTE
Assessment/Plan





- Problem List


(1) UTI (urinary tract infection)


Qualifiers: 


   Urinary tract infection type: acute cystitis   Hematuria presence: without 

hematuria   Qualified Code(s): N30.00 - Acute cystitis without hematuria   


Assessment/Plan: 


The admitting telemedicine doctor felt he had sepsis caused by a UTI.  He 

received Cefepime in the ED and was continued on empiric iv Ceftriaxone daily.





This patient's recent history and antibiotic use is complex however: He had a 

UTI that was being treated with Cipro, and on the urine cx from 5/19/23, he grew

E coli and Proteus mirabilis. I think he had not completed the Cipro course of 

treatment. Then he came to the ER on 5/29 due to hematuria, and blood and ur cx 

were drawn, and the ED provider sent him home on Cefpodoxime. Then that 5/29 bld

cx turned pos for Staph epi, felt to be a contaminant, but he was called on 5/31

and his wife reported he was worse. He came in now on 5/31 and had a fever on 

6/1. From this latest ER presentation, his urine cx has no growth and bld cx 

have no growth.


I reviewed this entire sequence of events, results and antibx usd with today's 

pharmacist, Herb.  He probably has a semitreated UTI.  Since during this 

admission his white bld count has gone from 11.2 >> 13.7 >> 9.8 yesterday >> 

11.4 today, we will keep him on iv Ceftriaxone, which the E coli and Proteus are

sens to.


Plan:


Continue empiric ceftriaxone IV daily, since the E coli and Proteus are sens to 

that


IVF will now be stopped


 


(2) Acute urinary retention


Telemedicine doctor was called 2 nights ago because the patient had greater than

1 L residual in his bladder on bladder scanning and Pineda catheter insertion was

ordered. Because of his dementia he tries to pull out his Pineda therefore soft 

restraints of upper extremities were ordered by me starting yesterday afternoon.


I restarted his home prostate meds Tamsolusin and Finasteride 2 days ago


Plan:


Continue with Pineda. I will order bladder training with Pineda clamping for 2 

hours q 4h, in hopes of removing the Pineda later today





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


The patient needs two person assist to stand or move, he thinks he is falling, 

he did not know what to do with his utensils to feed himself


I resumed his home med Quietipine


Plan:


PT and OT evaluated him but he is not a candidate for rehab at a SNF since he is

not directable due to his dementia


The wife said he is going to Memory Care soon. Based on what we see that he 

needs with caregiving help here, he should go straight to memory care in my 

opinion. SW called her and spoke to her about this today.





4) Chronic A-fib


Plan:


The patient is on Pradaxa which has been continued here.


He has not been ordered to have telemetry (since I expected him to try to pull 

the patches off too), but his vital signs show stable heart rate





(5) Sepsis


Assessment/Plan: 


RESOLVED


Patient had criteria for sepsis: White blood count 13.7, fever of 38.0 at 

midnight, tachycardic with heart rate of 100 at rest


Plan:


Continue with iv antibx


IVF will now be stopped.











- Current Meds


Current Meds: 





                               Current Medications











Generic Name Dose Route Start Last Admin





  Trade Name Freq  PRN Reason Stop Dose Admin


 


Acetaminophen  650 mg  05/31/23 19:45  06/02/23 13:59





  Acetaminophen 325 Mg Tablet  PO   650 mg





  Q4HR PRN   Administration





  Pain 1 to 4, or Fever  


 


Apixaban  5 mg  06/01/23 09:00  06/02/23 20:37





  Apixaban 5 Mg Tablet  PO   5 mg





  BID SEMAJ   Administration


 


Finasteride  5 mg  06/01/23 09:00  06/02/23 08:35





  Finasteride 5 Mg Tablet  PO   5 mg





  DAILY SEMAJ   Administration


 


Ceftriaxone Sodium 2 gm/  100 mls @ 200 mls/hr  06/01/23 11:00  06/02/23 09:15





  Sodium Chloride  IV   Infused





  DAILY SEMAJ   Infusion


 


Multi-Ingredient Ointment  1 applic  06/01/23 21:11  06/02/23 08:35





  Zinc Oxide 20% Oint 30 Gm Tube  TOP   1 applic





  PRN PRN   Administration





  Skin Care  


 


Nystatin  1 applic  06/02/23 21:00  06/02/23 21:09





  Nystatin Powder 15 Gm  TOP   1 applic





  BID SEMAJ   Administration


 


Oxycodone HCl  5 mg  05/31/23 19:44  06/03/23 07:49





  Oxycodone 5 Mg Tablet  PO   5 mg





  Q6H PRN   Administration





  PAIN  


 


Quetiapine Fumarate  75 mg  06/02/23 20:00  06/02/23 20:36





  Quetiapine 25 Mg Tablet  PO   75 mg





  2000 SEMAJ   Administration


 


Sodium Chloride  10 ml  06/01/23 01:00  06/03/23 00:22





  Sodium Chloride Flush 0.9% 10 Ml Syringe  IVP   Not Given





  0100,0900,1700 SEMAJ  


 


Tamsulosin HCl  0.4 mg  06/01/23 09:00  06/02/23 08:35





  Tamsulosin 0.4 Mg Capsule  PO   0.4 mg





  DAILY SEMAJ   Administration


 


Tamsulosin HCl  0.4 mg  06/02/23 21:00  06/02/23 20:36





  Tamsulosin 0.4 Mg Capsule  PO   0.4 mg





  BID SEMAJ   Administration














- Lab Result


Fish Bone Diagrams: 


                                 06/03/23 05:00





                                 06/03/23 04:46





- Additional Planning


My Orders: 





My Active Orders





06/02/23 10:10


Miscellaenous Nursing Order [RC] QSHIFT 





06/02/23 19:30


NonViolent Restraint(s) Q24H 





06/02/23 20:00


QUEtiapine [SEROquel]   75 mg PO 2000 06/02/23 21:00


Nystatin [Nystop]   1 applic TOP BID 


Tamsulosin [Flomax]   0.4 mg PO BID 














Subjective





- Subjective


Nursing Reports: Other (He mostly lays in bed and watches TV)





Objective


Vital Signs: 





                               Vital Signs - 24 hr











  06/02/23 06/02/23 06/03/23





  09:38 16:00 00:00


 


Temperature 36.8 C 36.7 C 36.8 C


 


Heart Rate [ 80 88 59 L





Brachial]   


 


Respiratory 16 18 20





Rate   


 


Blood Pressure 150/73 H 169/101 H 150/86 H





[Right Brachial   





artery]   


 


O2 Saturation 97 100 94








                                     Oxygen











O2 Source [With Activity]      Room air


 


O2 Source                      Room air














I&O (Last 24 Hrs): 





                          Intake and Output Totals x24h











 06/01/23 06/02/23 06/03/23





 23:59 23:59 23:59


 


Intake Total 3165 3720 1000


 


Output Total 1250 3000 1400


 


Balance 1915 720 -400











General: Alert, No acute distress


HEENT: Mucous membr. moist/pink


Neck: Supple, No JVD


Neuro: Alert, Disoriented, Non Focal


Cardiovascular: No murmurs


Respiratory: No respiratory distress


Abdomen: Normal bowel sounds, Soft, No tenderness


Genitourinary: Other (Pineda in place)


Extremities: No clubbing, No edema, No tenderness/swelling





- Results


Results: 





                               Laboratory Results











WBC  11.4 x10^3/uL (4.8-10.8)  H  06/03/23  05:00    


 


RBC  3.73 10^6/uL (4.70-6.10)  L  06/03/23  05:00    


 


Hgb  12.0 g/dL (14.0-18.0)  L  06/03/23  05:00    


 


Hct  36.7 % (42.0-52.0)  L  06/03/23  05:00    


 


MCV  98.4 fL (80.0-94.0)  H  06/03/23  05:00    


 


MCH  32.2 pg (27.0-31.0)  H  06/03/23  05:00    


 


MCHC  32.7 g/dL (32.0-36.0)   06/03/23  05:00    


 


RDW  13.3 % (12.0-15.0)   06/03/23  05:00    


 


Plt Count  296 10^3/uL (130-450)   06/03/23  05:00    


 


MPV  10.2 fL (7.4-11.4)   06/03/23  05:00    


 


Neut # (Auto)  7.5 10^3/uL (1.5-6.6)  H  06/03/23  05:00    


 


Lymph # (Auto)  2.1 10^3/uL (1.5-3.5)   06/03/23  05:00    


 


Mono # (Auto)  1.2 10^3/uL (0.0-1.0)  H  06/03/23  05:00    


 


Eos # (Auto)  0.6 10^3/uL (0.0-0.7)   06/03/23  05:00    


 


Baso # (Auto)  0.1 10^3/uL (0.0-0.1)   06/03/23  05:00    


 


Absolute Nucleated RBC  0.00 x10^3/uL  06/03/23  05:00    


 


Total Counted  100   05/31/23  18:25    


 


Band Neuts % (Manual)  0 % (0-10)  05/31/23  18:25    


 


Abnorm Lymph % (Manual)  0 %  05/31/23  18:25    


 


Nucleated RBC %  0.0 /100WBC  06/03/23  05:00    


 


Neutrophils # (Manual)  11.0 10^3/uL (1.5-6.6)  H  05/31/23  18:25    


 


Lymphocytes # (Manual)  1.4 10^3/uL (1.5-3.5)  L  05/31/23  18:25    


 


Monocytes # (Manual)  1.1 10^3/uL (0.0-1.0)  H  05/31/23  18:25    


 


Eosinophils # (Manual)  0.0 10^3/uL (0-0.7)   05/31/23  18:25    


 


Basophils # (Manual)  0.3 10^3/uL (0-0.1)  H  05/31/23  18:25    


 


Differential Comment  MANUAL DIFFERENTIAL   05/31/23  18:25    


 


Platelet Estimate  NORMAL (130-450,000)  (NORMAL)   05/31/23  18:25    


 


Platelet Morphology  NORMAL APPEARANCE  (NORMAL)   05/31/23  18:25    


 


RBC Morph Micro Appear  1+ MACROCYTOSIS  (NORMAL)   05/31/23  18:25    


 


Sodium  144 mmol/L (135-145)   06/03/23  04:46    


 


Potassium  4.1 mmol/L (3.5-5.0)   06/03/23  04:46    


 


Chloride  110 mmol/L (101-111)   06/03/23  04:46    


 


Carbon Dioxide  29 mmol/L (21-32)   06/03/23  04:46    


 


Anion Gap  5.0  (6-13)  L  06/03/23  04:46    


 


BUN  7 mg/dL (6-20)   06/03/23  04:46    


 


Creatinine  0.7 mg/dL (0.6-1.2)   06/03/23  04:46    


 


Estimated GFR (MDRD)  109  (>89)   06/03/23  04:46    


 


Glucose  83 mg/dL ()   06/03/23  04:46    


 


Lactic Acid  1.6 mmol/L (0.5-2.2)   05/31/23  18:25    


 


Calcium  8.6 mg/dL (8.5-10.3)   06/03/23  04:46    


 


Total Bilirubin  0.5 mg/dL (0.2-1.0)   06/03/23  04:46    


 


AST  14 IU/L (10-42)   06/03/23  04:46    


 


ALT  12 IU/L (10-60)   06/03/23  04:46    


 


Alkaline Phosphatase  42 IU/L ()   06/03/23  04:46    


 


Total Protein  6.2 g/dL (6.7-8.2)  L  06/03/23  04:46    


 


Albumin  2.7 g/dL (3.2-5.5)  L  06/03/23  04:46    


 


Globulin  3.5 g/dL (2.1-4.2)   06/03/23  04:46    


 


Albumin/Globulin Ratio  0.8  (1.0-2.2)  L  06/03/23  04:46    


 


Procalcitonin  < 0.05 ng/mL (<0.5)   05/31/23  18:25    


 


Urine Color  RED/BLOODY   05/31/23  18:40    


 


Urine Clarity  TURBID  (CLEAR)   05/31/23  18:40    


 


Urine pH  6.5 PH (5.0-7.5)   05/31/23  18:40    


 


Ur Specific Gravity  1.025  (1.002-1.030)   05/31/23  18:40    


 


Urine Protein  >=300 mg/dL (NEGATIVE)  H  05/31/23  18:40    


 


Urine Glucose (UA)  NEGATIVE mg/dL (NEGATIVE)   05/31/23  18:40    


 


Urine Ketones  TRACE mg/dL (NEGATIVE)   05/31/23  18:40    


 


Urine Occult Blood  LARGE  (NEGATIVE)  H  05/31/23  18:40    


 


Urine Nitrite  POSITIVE  (NEGATIVE)  H  05/31/23  18:40    


 


Urine Bilirubin  NEGATIVE  (NEGATIVE)   05/31/23  18:40    


 


Urine Urobilinogen  1 (NORMAL) E.U./dL (NORMAL)   05/31/23  18:40    


 


Ur Leukocyte Esterase  TRACE  (NEGATIVE)  H  05/31/23  18:40    


 


Urine RBC  TNTC /HPF (0-5)  H  05/31/23  18:40    


 


Urine WBC  4-5 /HPF (0-3)   05/31/23  18:40    


 


Ur Squamous Epith Cells  NONE SEEN  (<= Few)   05/31/23  18:40    


 


Amorphous Sediment  Moderate /LPF  05/31/23  18:40    


 


Urine Bacteria  Few /HPF (None Seen)   05/31/23  18:40    


 


Ur Microscopic Review  INDICATED   05/31/23  18:40    


 


Urine Culture Comments  INDICATED   05/31/23  18:40    


 


Nasal Adenovirus (PCR)  NOT DETECTED   05/31/23  20:15    


 


Nasal B. parapertussis DNA (PCR)  NOT DETECTED   05/31/23  20:15    


 


Nasal Coronavir 229E PCR  NOT DETECTED   05/31/23  20:15    


 


Nasal Coronavir HKU1 PCR  NOT DETECTED   05/31/23  20:15    


 


Nasal Coronavir NL63 PCR  NOT DETECTED   05/31/23  20:15    


 


Nasal Coronavir OC43 PCR  NOT DETECTED   05/31/23  20:15    


 


Nasal Enterovir/Rhinovir PCR  NOT DETECTED   05/31/23  20:15    


 


Nasal Influenza B PCR  NOT DETECTED   05/31/23  20:15    


 


Nasal Influenza A PCR  NOT DETECTED   05/31/23  20:15    


 


Nasal Parainfluen 1 PCR  NOT DETECTED   05/31/23  20:15    


 


Nasal Parainfluen 2 PCR  NOT DETECTED   05/31/23  20:15    


 


Nasal Parainfluen 3 PCR  NOT DETECTED   05/31/23  20:15    


 


Nasal Parainfluen 4 PCR  NOT DETECTED   05/31/23  20:15    


 


Nasal RSV (PCR)  NOT DETECTED   05/31/23  20:15    


 


Nasal B.pertussis DNA PCR  NOT DETECTED   05/31/23  20:15    


 


Nasal C.pneumoniae (PCR)  NOT DETECTED   05/31/23  20:15    


 


Pranav Human Metapneumo PCR  NOT DETECTED   05/31/23  20:15    


 


Nasal M.pneumoniae (PCR)  NOT DETECTED   05/31/23  20:15    


 


Nasal SARS-CoV-2 (PCR)  NOT DETECTED   05/31/23  20:15    














Sepsis Event Note (H)





- Evaluation


Current Stage of Sepsis: Sepsis


Possible source of Sepsis: positive: Unknown





- Sepsis Criteria


Sepsis Criteria: Recorded Heart Rate greater than 90 bpm, WBC count greater than

10% bands, WBC count greater than 12,000 or less than 4000

## 2023-06-04 LAB
BASOPHILS NFR BLD AUTO: 0.1 10^3/UL (ref 0–0.1)
BASOPHILS NFR BLD AUTO: 0.7 %
EOSINOPHIL # BLD AUTO: 0.5 10^3/UL (ref 0–0.7)
EOSINOPHIL NFR BLD AUTO: 4.7 %
ERYTHROCYTE [DISTWIDTH] IN BLOOD BY AUTOMATED COUNT: 13.5 % (ref 12–15)
HCT VFR BLD AUTO: 38.8 % (ref 42–52)
HGB UR QL STRIP: 12.5 G/DL (ref 14–18)
LYMPHOCYTES # SPEC AUTO: 2.4 10^3/UL (ref 1.5–3.5)
LYMPHOCYTES NFR BLD AUTO: 22.4 %
MCH RBC QN AUTO: 32.1 PG (ref 27–31)
MCHC RBC AUTO-ENTMCNC: 32.2 G/DL (ref 32–36)
MCV RBC AUTO: 99.5 FL (ref 80–94)
MONOCYTES # BLD AUTO: 1.1 10^3/UL (ref 0–1)
MONOCYTES NFR BLD AUTO: 10.7 %
NEUTROPHILS # BLD AUTO: 6.5 10^3/UL (ref 1.5–6.6)
NEUTROPHILS # SNV AUTO: 10.7 X10^3/UL (ref 4.8–10.8)
NEUTROPHILS NFR BLD AUTO: 61.1 %
NRBC # BLD AUTO: 0 /100WBC
NRBC # BLD AUTO: 0 X10^3/UL
PDW BLD AUTO: 10.6 FL (ref 7.4–11.4)
PLATELET # BLD: 330 10^3/UL (ref 130–450)
RBC MAR: 3.9 10^6/UL (ref 4.7–6.1)

## 2023-06-04 RX ADMIN — APIXABAN SCH MG: 5 TABLET, FILM COATED ORAL at 20:01

## 2023-06-04 RX ADMIN — OXYCODONE PRN MG: 5 TABLET ORAL at 00:01

## 2023-06-04 RX ADMIN — ACETAMINOPHEN PRN MG: 325 TABLET ORAL at 19:07

## 2023-06-04 RX ADMIN — SODIUM CHLORIDE, PRESERVATIVE FREE SCH ML: 5 INJECTION INTRAVENOUS at 16:36

## 2023-06-04 RX ADMIN — FINASTERIDE SCH MG: 5 TABLET, FILM COATED ORAL at 09:06

## 2023-06-04 RX ADMIN — ACETAMINOPHEN PRN MG: 325 TABLET ORAL at 14:12

## 2023-06-04 RX ADMIN — OXYCODONE PRN MG: 5 TABLET ORAL at 07:40

## 2023-06-04 RX ADMIN — SODIUM CHLORIDE, PRESERVATIVE FREE SCH ML: 5 INJECTION INTRAVENOUS at 09:06

## 2023-06-04 RX ADMIN — OXYCODONE PRN MG: 5 TABLET ORAL at 19:07

## 2023-06-04 RX ADMIN — TAMSULOSIN HYDROCHLORIDE SCH MG: 0.4 CAPSULE ORAL at 20:01

## 2023-06-04 RX ADMIN — ACETAMINOPHEN PRN MG: 325 TABLET ORAL at 04:18

## 2023-06-04 RX ADMIN — OXYCODONE PRN MG: 5 TABLET ORAL at 13:21

## 2023-06-04 RX ADMIN — APIXABAN SCH MG: 5 TABLET, FILM COATED ORAL at 09:06

## 2023-06-04 RX ADMIN — NYSTATIN SCH APPLIC: 100000 POWDER TOPICAL at 19:51

## 2023-06-04 RX ADMIN — NYSTATIN SCH APPLIC: 100000 POWDER TOPICAL at 09:07

## 2023-06-04 RX ADMIN — TAMSULOSIN HYDROCHLORIDE SCH MG: 0.4 CAPSULE ORAL at 09:06

## 2023-06-04 RX ADMIN — SODIUM CHLORIDE SCH MLS/HR: 900 INJECTION INTRAVENOUS at 09:06

## 2023-06-04 NOTE — PROVIDER PROGRESS NOTE
Assessment/Plan





- Problem List


(1) UTI (urinary tract infection)


Qualifiers: 


   Urinary tract infection type: acute cystitis   Hematuria presence: without 

hematuria   Qualified Code(s): N30.00 - Acute cystitis without hematuria   


Assessment/Plan: 


The admitting telemedicine doctor felt he had sepsis caused by a UTI.  He 

received Cefepime in the ED and was continued on empiric iv Ceftriaxone daily.





This patient's recent history and antibiotic use is complex however: He had a 

UTI that was being treated with Cipro, and on the urine cx from 5/19/23, he grew

E coli and Proteus mirabilis. I think he had not completed the Cipro course of 

treatment. Then he came to the ER on 5/29 due to hematuria, and blood and ur cx 

were drawn, and the ED provider sent him home on Cefpodoxime. Then that 5/29 bld

cx turned pos for Staph epi, felt to be a contaminant, but he was called on 5/31

and his wife reported he was worse. He came in now on 5/31 and had a fever on 

6/1. From this latest ER presentation, his urine cx has no growth and bld cx 

have no growth.


I reviewed this entire sequence of events, results and antibx used with our 

armacist.  He probably has a semitreated UTI.  Since during this admission his 

white bld count has gone from 11.2 >> 13.7 >> 9.8 >> 11.4 yesterday>> 10.7 

today, we will keep him on iv Ceftriaxone, which the E coli and Proteus are sens

to for another day at least. I will then change to po antibx. I will plan a 21-

day total course of antibiotics for good prostate penetration


IVF were stopped


Plan:


Continue empiric ceftriaxone IV daily, since the E coli and Proteus are sens to 

that


I updated the wife and pt's daughter-in-law today at bedside


 


(2) Acute urinary retention


Telemedicine doctor was called 2 nights ago because the patient had greater than

1 L residual in his bladder on bladder scanning and Pineda catheter insertion was

ordered. Because of his dementia he tries to pull out his Pineda therefore soft 

restraints of upper extremities were ordered by me starting yesterday afternoon.


I restarted his home prostate meds Tamsolusin and Finasteride 2 days ago


The Pineda was removed last evening


Plan:


We will be bladder scanning him to check for recurrent urinary retention





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


Today the wife told me that the patient was able to walk without walker until 

about 2 weeks ago.  Then he got weak and has not been able to stand. The patient

now needs two person assist to stand or move, he thinks he is falling, he did 

not know what to do with his utensils to feed himself


I resumed his home med Quietipine


PT and OT evaluated him but he is not a candidate for rehab at a SNF since he is

not directable due to his dementia. I updated the wife about this today, and 

said that he now may be bedbound or need to be put in a lift to be transferred 

from bed to a chair. She understood.


The wife confirmed to me today that he is going to [facility] from 

here.


Plan:


Will update social work (they are not here today it is a Sunday), regarding this

patient's caregiving needs and the wife's plan 





4) HTN


This is a new diagnosis for him.  He is as high as 180s over 100s. The nurse 

suspected that it was because he has poor pain control.  He cannot tell us where

his pain is because of his dementia


As needed IV Hydralazine was ordered by the telemedicine doctor


Plan:


Treat his pain. I spoke to his wife asking where the source of his pain usually 

is and she said lower back. I informed her that using narcotics for pain control

will definitely add to his confusion and somnolence. I will order a Lidocaine 

patch scheduled daily


I will start him on Amlodipine 5 mg daily today for the HTN





5) Chronic A-fib


Plan:


The patient is on Pradaxa which has been continued here.


He has not been ordered to have telemetry (since I expected him to try to pull 

the patches off too), but his vital signs show stable heart rate





(6) Sepsis


Assessment/Plan: 


RESOLVED


Patient had criteria for sepsis: White blood count 13.7, fever of 38.0 at 

midnight, tachycardic with heart rate of 100 at rest. IVF were stopped.


Plan:


Continue with iv antibx











- Current Meds


Current Meds: 





                               Current Medications











Generic Name Dose Route Start Last Admin





  Trade Name Freq  PRN Reason Stop Dose Admin


 


Acetaminophen  650 mg  05/31/23 19:45  06/04/23 04:18





  Acetaminophen 325 Mg Tablet  PO   650 mg





  Q4HR PRN   Administration





  Pain 1 to 4, or Fever  


 


Amlodipine Besylate  5 mg  06/04/23 09:00  06/04/23 09:06





  Amlodipine 5 Mg Tablet  PO   5 mg





  DAILY SEMAJ   Administration


 


Apixaban  5 mg  06/01/23 09:00  06/04/23 09:06





  Apixaban 5 Mg Tablet  PO   5 mg





  BID SEMAJ   Administration


 


Finasteride  5 mg  06/01/23 09:00  06/04/23 09:06





  Finasteride 5 Mg Tablet  PO   5 mg





  DAILY SEMAJ   Administration


 


Ceftriaxone Sodium 2 gm/  100 mls @ 200 mls/hr  06/01/23 11:00  06/04/23 09:06





  Sodium Chloride  IV   200 mls/hr





  DAILY SEMAJ   Administration


 


Lidocaine  1 patch  05/31/23 19:44  06/03/23 10:40





  Lidocaine Patch 5%  TOP   1 patch





  DAILY PRN   Administration





  pain  


 


Multi-Ingredient Ointment  1 applic  06/01/23 21:11  06/02/23 08:35





  Zinc Oxide 20% Oint 30 Gm Tube  TOP   1 applic





  PRN PRN   Administration





  Skin Care  


 


Nystatin  1 applic  06/02/23 21:00  06/04/23 09:07





  Nystatin Powder 15 Gm  TOP   1 applic





  BID SEMAJ   Administration


 


Oxycodone HCl  5 mg  05/31/23 19:44  06/04/23 07:40





  Oxycodone 5 Mg Tablet  PO   5 mg





  Q6H PRN   Administration





  PAIN  


 


Quetiapine Fumarate  75 mg  06/02/23 20:00  06/03/23 19:56





  Quetiapine 25 Mg Tablet  PO   75 mg





  2000 SEMAJ   Administration


 


Sodium Chloride  10 ml  06/01/23 01:00  06/04/23 09:06





  Sodium Chloride Flush 0.9% 10 Ml Syringe  IVP   10 ml





  0100,0900,1700 SEMAJ   Administration


 


Tamsulosin HCl  0.4 mg  06/02/23 21:00  06/04/23 09:06





  Tamsulosin 0.4 Mg Capsule  PO   0.4 mg





  BID SEMAJ   Administration














- Lab Result


Fish Bone Diagrams: 


                                 06/04/23 04:39





                                 06/03/23 04:46





- Additional Planning


My Orders: 





My Active Orders





06/04/23 09:00


amLODIPine [Norvasc]   5 mg PO DAILY 





06/04/23 Lunch


DIET [Low Sodium Diet] [DIET] 














Subjective





- Subjective


Patient Reports: Other (Lethargic (after getting oxycodone for pain))





Objective


Vital Signs: 





                               Vital Signs - 24 hr











  06/03/23 06/04/23 06/04/23





  16:08 04:16 04:40


 


Temperature 37.3 C 36.5 C 


 


Heart Rate [ 91 96 





Brachial]   


 


Respiratory 22 22 





Rate   


 


Blood Pressure   


 


Blood Pressure 142/105 H  165/109 H





[Left Brachial   





artery]   


 


Blood Pressure   





[Left Radial   





artery]   


 


O2 Saturation 97 96 














  06/04/23 06/04/23





  07:00 08:06


 


Temperature  36.7 C


 


Heart Rate [  99





Brachial]  


 


Respiratory  24





Rate  


 


Blood Pressure 165/103 H 


 


Blood Pressure  





[Left Brachial  





artery]  


 


Blood Pressure  136/78 H





[Left Radial  





artery]  


 


O2 Saturation  93








                                     Oxygen











O2 Source [With Activity]      Room air


 


O2 Source                      Room air














I&O (Last 24 Hrs): 





                          Intake and Output Totals x24h











 06/02/23 06/03/23 06/04/23





 23:59 23:59 23:59


 


Intake Total 3720 3410 930


 


Output Total 3000 1836 


 


Balance 720 1574 930











General: Other (Lethargic)


HEENT: Mucous membr. moist/pink


Neck: Supple


Neuro: Other (Today he is lethargic after getting pain meds.  Mostly he moves 

all 4 extremities spontaneously)


Cardiovascular: No murmurs


Respiratory: No respiratory distress


Extremities: No clubbing, No edema, No tenderness/swelling





- Results


Results: 





                               Laboratory Results











WBC  10.7 x10^3/uL (4.8-10.8)   06/04/23  04:39    


 


RBC  3.90 10^6/uL (4.70-6.10)  L  06/04/23  04:39    


 


Hgb  12.5 g/dL (14.0-18.0)  L  06/04/23  04:39    


 


Hct  38.8 % (42.0-52.0)  L  06/04/23  04:39    


 


MCV  99.5 fL (80.0-94.0)  H  06/04/23  04:39    


 


MCH  32.1 pg (27.0-31.0)  H  06/04/23  04:39    


 


MCHC  32.2 g/dL (32.0-36.0)   06/04/23  04:39    


 


RDW  13.5 % (12.0-15.0)   06/04/23  04:39    


 


Plt Count  330 10^3/uL (130-450)   06/04/23  04:39    


 


MPV  10.6 fL (7.4-11.4)   06/04/23  04:39    


 


Neut # (Auto)  6.5 10^3/uL (1.5-6.6)   06/04/23  04:39    


 


Lymph # (Auto)  2.4 10^3/uL (1.5-3.5)   06/04/23  04:39    


 


Mono # (Auto)  1.1 10^3/uL (0.0-1.0)  H  06/04/23  04:39    


 


Eos # (Auto)  0.5 10^3/uL (0.0-0.7)   06/04/23  04:39    


 


Baso # (Auto)  0.1 10^3/uL (0.0-0.1)   06/04/23  04:39    


 


Absolute Nucleated RBC  0.00 x10^3/uL  06/04/23  04:39    


 


Total Counted  100   05/31/23  18:25    


 


Band Neuts % (Manual)  0 % (0-10)  05/31/23  18:25    


 


Abnorm Lymph % (Manual)  0 %  05/31/23  18:25    


 


Nucleated RBC %  0.0 /100WBC  06/04/23  04:39    


 


Neutrophils # (Manual)  11.0 10^3/uL (1.5-6.6)  H  05/31/23  18:25    


 


Lymphocytes # (Manual)  1.4 10^3/uL (1.5-3.5)  L  05/31/23  18:25    


 


Monocytes # (Manual)  1.1 10^3/uL (0.0-1.0)  H  05/31/23  18:25    


 


Eosinophils # (Manual)  0.0 10^3/uL (0-0.7)   05/31/23  18:25    


 


Basophils # (Manual)  0.3 10^3/uL (0-0.1)  H  05/31/23  18:25    


 


Differential Comment  MANUAL DIFFERENTIAL   05/31/23  18:25    


 


Platelet Estimate  NORMAL (130-450,000)  (NORMAL)   05/31/23  18:25    


 


Platelet Morphology  NORMAL APPEARANCE  (NORMAL)   05/31/23  18:25    


 


RBC Morph Micro Appear  1+ MACROCYTOSIS  (NORMAL)   05/31/23  18:25    


 


Sodium  144 mmol/L (135-145)   06/03/23  04:46    


 


Potassium  4.1 mmol/L (3.5-5.0)   06/03/23  04:46    


 


Chloride  110 mmol/L (101-111)   06/03/23  04:46    


 


Carbon Dioxide  29 mmol/L (21-32)   06/03/23  04:46    


 


Anion Gap  5.0  (6-13)  L  06/03/23  04:46    


 


BUN  7 mg/dL (6-20)   06/03/23  04:46    


 


Creatinine  0.7 mg/dL (0.6-1.2)   06/03/23  04:46    


 


Estimated GFR (MDRD)  109  (>89)   06/03/23  04:46    


 


Glucose  83 mg/dL ()   06/03/23  04:46    


 


Lactic Acid  1.6 mmol/L (0.5-2.2)   05/31/23  18:25    


 


Calcium  8.6 mg/dL (8.5-10.3)   06/03/23  04:46    


 


Total Bilirubin  0.5 mg/dL (0.2-1.0)   06/03/23  04:46    


 


AST  14 IU/L (10-42)   06/03/23  04:46    


 


ALT  12 IU/L (10-60)   06/03/23  04:46    


 


Alkaline Phosphatase  42 IU/L ()   06/03/23  04:46    


 


Total Protein  6.2 g/dL (6.7-8.2)  L  06/03/23  04:46    


 


Albumin  2.7 g/dL (3.2-5.5)  L  06/03/23  04:46    


 


Globulin  3.5 g/dL (2.1-4.2)   06/03/23  04:46    


 


Albumin/Globulin Ratio  0.8  (1.0-2.2)  L  06/03/23  04:46    


 


Procalcitonin  < 0.05 ng/mL (<0.5)   05/31/23  18:25    


 


Urine Color  RED/BLOODY   05/31/23  18:40    


 


Urine Clarity  TURBID  (CLEAR)   05/31/23  18:40    


 


Urine pH  6.5 PH (5.0-7.5)   05/31/23  18:40    


 


Ur Specific Gravity  1.025  (1.002-1.030)   05/31/23  18:40    


 


Urine Protein  >=300 mg/dL (NEGATIVE)  H  05/31/23  18:40    


 


Urine Glucose (UA)  NEGATIVE mg/dL (NEGATIVE)   05/31/23  18:40    


 


Urine Ketones  TRACE mg/dL (NEGATIVE)   05/31/23  18:40    


 


Urine Occult Blood  LARGE  (NEGATIVE)  H  05/31/23  18:40    


 


Urine Nitrite  POSITIVE  (NEGATIVE)  H  05/31/23  18:40    


 


Urine Bilirubin  NEGATIVE  (NEGATIVE)   05/31/23  18:40    


 


Urine Urobilinogen  1 (NORMAL) E.U./dL (NORMAL)   05/31/23  18:40    


 


Ur Leukocyte Esterase  TRACE  (NEGATIVE)  H  05/31/23  18:40    


 


Urine RBC  TNTC /HPF (0-5)  H  05/31/23  18:40    


 


Urine WBC  4-5 /HPF (0-3)   05/31/23  18:40    


 


Ur Squamous Epith Cells  NONE SEEN  (<= Few)   05/31/23  18:40    


 


Amorphous Sediment  Moderate /LPF  05/31/23  18:40    


 


Urine Bacteria  Few /HPF (None Seen)   05/31/23  18:40    


 


Ur Microscopic Review  INDICATED   05/31/23  18:40    


 


Urine Culture Comments  INDICATED   05/31/23  18:40    


 


Nasal Adenovirus (PCR)  NOT DETECTED   05/31/23  20:15    


 


Nasal B. parapertussis DNA (PCR)  NOT DETECTED   05/31/23  20:15    


 


Nasal Coronavir 229E PCR  NOT DETECTED   05/31/23  20:15    


 


Nasal Coronavir HKU1 PCR  NOT DETECTED   05/31/23  20:15    


 


Nasal Coronavir NL63 PCR  NOT DETECTED   05/31/23  20:15    


 


Nasal Coronavir OC43 PCR  NOT DETECTED   05/31/23  20:15    


 


Nasal Enterovir/Rhinovir PCR  NOT DETECTED   05/31/23  20:15    


 


Nasal Influenza B PCR  NOT DETECTED   05/31/23  20:15    


 


Nasal Influenza A PCR  NOT DETECTED   05/31/23  20:15    


 


Nasal Parainfluen 1 PCR  NOT DETECTED   05/31/23  20:15    


 


Nasal Parainfluen 2 PCR  NOT DETECTED   05/31/23  20:15    


 


Nasal Parainfluen 3 PCR  NOT DETECTED   05/31/23  20:15    


 


Nasal Parainfluen 4 PCR  NOT DETECTED   05/31/23  20:15    


 


Nasal RSV (PCR)  NOT DETECTED   05/31/23  20:15    


 


Nasal B.pertussis DNA PCR  NOT DETECTED   05/31/23  20:15    


 


Nasal C.pneumoniae (PCR)  NOT DETECTED   05/31/23  20:15    


 


Pranav Human Metapneumo PCR  NOT DETECTED   05/31/23  20:15    


 


Nasal M.pneumoniae (PCR)  NOT DETECTED   05/31/23  20:15    


 


Nasal SARS-CoV-2 (PCR)  NOT DETECTED   05/31/23  20:15    














Sepsis Event Note (H)





- Evaluation


Current Stage of Sepsis: Sepsis


Possible source of Sepsis: positive: Unknown





- Sepsis Criteria


Sepsis Criteria: Recorded Heart Rate greater than 90 bpm, WBC count greater than

10% bands, WBC count greater than 12,000 or less than 4000

## 2023-06-05 LAB
ANION GAP SERPL CALCULATED.4IONS-SCNC: 5 MMOL/L (ref 6–13)
BASOPHILS NFR BLD AUTO: 0.1 10^3/UL (ref 0–0.1)
BASOPHILS NFR BLD AUTO: 0.7 %
BUN SERPL-MCNC: 15 MG/DL (ref 6–20)
CALCIUM UR-MCNC: 8.7 MG/DL (ref 8.5–10.3)
CHLORIDE SERPL-SCNC: 108 MMOL/L (ref 101–111)
CO2 SERPL-SCNC: 29 MMOL/L (ref 21–32)
CREAT SERPLBLD-SCNC: 1.1 MG/DL (ref 0.6–1.2)
EOSINOPHIL # BLD AUTO: 0.7 10^3/UL (ref 0–0.7)
EOSINOPHIL NFR BLD AUTO: 7.2 %
ERYTHROCYTE [DISTWIDTH] IN BLOOD BY AUTOMATED COUNT: 13.4 % (ref 12–15)
GFRSERPLBLD MDRD-ARVRAT: 64 ML/MIN/{1.73_M2} (ref 89–?)
GLUCOSE SERPL-MCNC: 88 MG/DL (ref 70–100)
HCT VFR BLD AUTO: 37.4 % (ref 42–52)
HGB UR QL STRIP: 12.3 G/DL (ref 14–18)
LYMPHOCYTES # SPEC AUTO: 1.9 10^3/UL (ref 1.5–3.5)
LYMPHOCYTES NFR BLD AUTO: 19 %
MCH RBC QN AUTO: 32.6 PG (ref 27–31)
MCHC RBC AUTO-ENTMCNC: 32.9 G/DL (ref 32–36)
MCV RBC AUTO: 99.2 FL (ref 80–94)
MONOCYTES # BLD AUTO: 1.1 10^3/UL (ref 0–1)
MONOCYTES NFR BLD AUTO: 10.8 %
NEUTROPHILS # BLD AUTO: 6.3 10^3/UL (ref 1.5–6.6)
NEUTROPHILS # SNV AUTO: 10.1 X10^3/UL (ref 4.8–10.8)
NEUTROPHILS NFR BLD AUTO: 62 %
NRBC # BLD AUTO: 0 /100WBC
NRBC # BLD AUTO: 0 X10^3/UL
PDW BLD AUTO: 10 FL (ref 7.4–11.4)
PLATELET # BLD: 336 10^3/UL (ref 130–450)
POTASSIUM SERPL-SCNC: 3.5 MMOL/L (ref 3.5–5)
RBC MAR: 3.77 10^6/UL (ref 4.7–6.1)
SODIUM SERPLBLD-SCNC: 142 MMOL/L (ref 135–145)

## 2023-06-05 RX ADMIN — SODIUM CHLORIDE, PRESERVATIVE FREE SCH ML: 5 INJECTION INTRAVENOUS at 16:00

## 2023-06-05 RX ADMIN — FERRIC OXIDE RED PRN APPLIC: 8; 8 LOTION TOPICAL at 18:51

## 2023-06-05 RX ADMIN — APIXABAN SCH MG: 5 TABLET, FILM COATED ORAL at 19:50

## 2023-06-05 RX ADMIN — ACETAMINOPHEN PRN MG: 325 TABLET ORAL at 05:20

## 2023-06-05 RX ADMIN — ACETAMINOPHEN PRN MG: 325 TABLET ORAL at 21:25

## 2023-06-05 RX ADMIN — TAMSULOSIN HYDROCHLORIDE SCH MG: 0.4 CAPSULE ORAL at 08:17

## 2023-06-05 RX ADMIN — APIXABAN SCH MG: 5 TABLET, FILM COATED ORAL at 08:17

## 2023-06-05 RX ADMIN — Medication SCH TAB: at 17:15

## 2023-06-05 RX ADMIN — FINASTERIDE SCH MG: 5 TABLET, FILM COATED ORAL at 08:17

## 2023-06-05 RX ADMIN — SODIUM CHLORIDE, PRESERVATIVE FREE SCH ML: 5 INJECTION INTRAVENOUS at 08:21

## 2023-06-05 RX ADMIN — SODIUM CHLORIDE, PRESERVATIVE FREE SCH ML: 5 INJECTION INTRAVENOUS at 02:43

## 2023-06-05 RX ADMIN — SODIUM CHLORIDE SCH MLS/HR: 900 INJECTION INTRAVENOUS at 08:16

## 2023-06-05 RX ADMIN — NYSTATIN SCH APPLIC: 100000 POWDER TOPICAL at 19:50

## 2023-06-05 RX ADMIN — ACETAMINOPHEN PRN MG: 325 TABLET ORAL at 12:50

## 2023-06-05 RX ADMIN — OXYCODONE PRN MG: 5 TABLET ORAL at 05:20

## 2023-06-05 RX ADMIN — ACETAMINOPHEN PRN MG: 325 TABLET ORAL at 17:15

## 2023-06-05 RX ADMIN — TAMSULOSIN HYDROCHLORIDE SCH MG: 0.4 CAPSULE ORAL at 19:49

## 2023-06-05 RX ADMIN — LIDOCAINE SCH PATCH: 50 PATCH TOPICAL at 08:18

## 2023-06-05 RX ADMIN — NYSTATIN SCH APPLIC: 100000 POWDER TOPICAL at 08:20

## 2023-06-05 NOTE — PROVIDER PROGRESS NOTE
Hospitalist Cross-cover Note





- Cross-Cover Note


Cross-Cover Note: 





Pt w urinary retention requiring recurrent straight cath. Pineda catheter 

ordered.

## 2023-06-05 NOTE — PROVIDER PROGRESS NOTE
Assessment/Plan





- Problem List


(1) UTI (urinary tract infection)


Qualifiers: 


   Urinary tract infection type: acute cystitis   Hematuria presence: without 

hematuria   Qualified Code(s): N30.00 - Acute cystitis without hematuria   


Assessment/Plan: 


This patient's recent history and antibiotic use is complex however: He had a 

UTI that was being treated with Cipro, and on the urine cx from 5/19/23, he grew

E coli and Proteus mirabilis. I think he had not completed the Cipro course of 

treatment. Then he came to the ER on 5/29 due to hematuria, and blood and ur cx 

were drawn, and the ED provider sent him home on Cefpodoxime. Then that 5/29 bld

cx turned pos for Staph epi, felt to be a contaminant, but he was called on 5/31

and his wife reported he was worse. He came in now on 5/31 and had a fever on 

6/1. From this latest ER presentation, his urine cx has no growth and bld cx 

have no growth.


The admitting telemedicine doctor felt he had sepsis caused by a UTI.  He 

received Cefepime in the ED and has been continued on empiric iv Ceftriaxone 

daily.


I reviewed this entire sequence of events, results and antibx used with our 

pharmacist.  He probably has a semitreated UTI.  I reviewed all labs. Since 

during this admission his white bld count has gone from 11.2 >> 13.7 >> 9.8 >> 

11.4 >> 10.7 yesterday>> 10.1 today, I kept him on iv Ceftriaxone, which the E 

coli and Proteus are sens to. 


IVF were stopped.


Plan:


Will change ceftriaxone IV daily to oral Levaquin, based on the sensitivity 

profiles of the E coli and Proteus. This choice was reviewed with jan Estevez today. I will plan a 21-day total course of antibiotics for good prostate

penetration. He has gotten 6 days of iv antibx, so 15 more days using Levaquin 

once daily, plus a probiotic. Today is Day 1 of 15 days.


I want to assess him after at least 24 hours of being on an oral antibiotic to 

assure that there is no return of fever or rising WBC.


I reviewed all the above with the daughter and daughter-in-law at bedside today





(2) Acute urinary retention


His home prostate meds Tamsolusin and Finasteride are being given here.


This patient has had several episodes of urinary retention greater than 1 L. He 

had a Pineda in which was discontinued 2 days ago after bladder training.  

However, again last night Telemedicine doctor was called because the patient had

2 L found on bladder scanning and Pineda catheter insertion was ordered. 


Because of his dementia he has tried to pull out his Pineda, when he last had it,

therefore soft restraints of upper extremities were needed


Plan:


Unfortunately we again have to order upper extremity restraints so he does not 

pull out his Pineda. 


The other alternative is to not use a chronic Pineda, but to schedule straight 

cathing for him possibly twice daily or at least daily, [facility]. SW checked if that center would do straight caths and they cannot. 

I discussed this with the wife at bedside, and daughter-in-law present.





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


On 6/4 the wife told me that the patient was able to walk without walker until 

about 2 weeks ago.  Then he got weak and has not been able to stand. The patient

now needs two person assist to stand or move, he thinks he is falling, he did 

not know what to do with his utensils to feed himself


I resumed his home med Quietipine


PT and OT evaluated him but he is not a candidate for rehab at a SNF since he is

not directable due to his dementia. I updated the wife about this today, and 

said that he now may be bedbound or need to be put in a lift to be transferred 

from bed to a chair. She understood.


The wife confirmed that she wants go to a [facility]. 


SW checked if that center would do straight caths and they cannot. I discussed 

this with the wife at bedside, and daughter-in-law present.





(4) HTN


This is a new diagnosis for him.  His BP is as high as 180s over 100s. His nurse

suspected that it was because he has poor pain control.  He cannot tell us where

his pain is because of his dementia


As needed IV Hydralazine was ordered by the telemedicine doctor


I addressed his pain control and started him on Amlodipine 5 mg daily 


Plan:


Continue Amlodipine daily





(5) Low back pain


I spoke to his wife on 6/4 , asking where the source of his pain usually is and 

she said she thinks it's his lower back. I informed her that using narcotics for

pain control will definitely add to his confusion and somnolence. I ordered a 

Lidocaine patch scheduled daily, not prn


Plan:


Continue pain control with a scheduled Lidocaine patch


I have decreased his oxycodone from 5 mg every 6 hours prn to 2.5 mg every 8 

hours prn.





(6) Lethargy


Only since he has gotten morphine or oxycodone has he been hypersomnolent.


Plan:


I will decrease his narcotic pain med dose oxycodone from 5 mg every 6 hours prn

to 2.5 mg every 8 hours prn.





(7) Chronic A-fib


Plan:


The patient was on Pradaxa, and a DOAC has been continued here.


He has not been ordered to have telemetry (since I expected him to try to pull 

the patches off too), but his vital signs show stable heart rate





(8) Sepsis


RESOLVED


Patient had criteria for sepsis: White blood count 13.7, fever of 38.0 at 

midnight, tachycardic with heart rate of 100 at rest. IVF were stopped.


Plan:


Continue with antibx











- Current Meds


Current Meds: 





                               Current Medications











Generic Name Dose Route Start Last Admin





  Trade Name Freq  PRN Reason Stop Dose Admin


 


Acetaminophen  650 mg  05/31/23 19:45  06/05/23 05:20





  Acetaminophen 325 Mg Tablet  PO   650 mg





  Q4HR PRN   Administration





  Pain 1 to 4, or Fever  


 


Amlodipine Besylate  5 mg  06/04/23 09:00  06/04/23 09:06





  Amlodipine 5 Mg Tablet  PO   5 mg





  DAILY SEMAJ   Administration


 


Apixaban  5 mg  06/01/23 09:00  06/04/23 20:01





  Apixaban 5 Mg Tablet  PO   5 mg





  BID SEMAJ   Administration


 


Finasteride  5 mg  06/01/23 09:00  06/04/23 09:06





  Finasteride 5 Mg Tablet  PO   5 mg





  DAILY SEMAJ   Administration


 


Ceftriaxone Sodium 2 gm/  100 mls @ 200 mls/hr  06/01/23 11:00  06/04/23 09:59





  Sodium Chloride  IV   Infused





  DAILY SEMAJ   Infusion


 


Multi-Ingredient Ointment  1 applic  06/01/23 21:11  06/02/23 08:35





  Zinc Oxide 20% Oint 30 Gm Tube  TOP   1 applic





  PRN PRN   Administration





  Skin Care  


 


Nystatin  1 applic  06/02/23 21:00  06/04/23 19:51





  Nystatin Powder 15 Gm  TOP   1 applic





  BID SEMAJ   Administration


 


Oxycodone HCl  5 mg  05/31/23 19:44  06/05/23 05:20





  Oxycodone 5 Mg Tablet  PO   5 mg





  Q6H PRN   Administration





  PAIN  


 


Quetiapine Fumarate  75 mg  06/02/23 20:00  06/04/23 20:01





  Quetiapine 25 Mg Tablet  PO   75 mg





  2000 SEMAJ   Administration


 


Sodium Chloride  10 ml  06/01/23 01:00  06/05/23 02:43





  Sodium Chloride Flush 0.9% 10 Ml Syringe  IVP   10 ml





  0100,0900,1700 SEMAJ   Administration


 


Tamsulosin HCl  0.4 mg  06/02/23 21:00  06/04/23 20:01





  Tamsulosin 0.4 Mg Capsule  PO   0.4 mg





  BID SEMAJ   Administration














- Lab Result


Fish Bone Diagrams: 


                                 06/05/23 08:45





                                 06/05/23 08:45





- Additional Planning


My Orders: 





My Active Orders





06/04/23 09:00


amLODIPine [Norvasc]   5 mg PO DAILY 





06/04/23 Lunch


DIET [Low Sodium Diet] [DIET] 





06/04/23 16:09


Miscellaenous Nursing Order [RC] QSHIFT 





06/05/23 09:00


Lidocaine Patch 5% [Lidoderm Patch]   1 patch TOP DAILY 














Subjective





- Subjective


Patient Reports: Other (Asleep after a.m. oxycodone was given)





Objective


Vital Signs: 





                               Vital Signs - 24 hr











  06/04/23 06/05/23 06/05/23





  16:35 00:27 08:00


 


Temperature 36.8 C 36.4 C L 36.8 C


 


Heart Rate [ 99 81 64





Brachial]   


 


Respiratory 22 22 12





Rate   


 


Blood Pressure 125/75  





[Left Brachial   





artery]   


 


Blood Pressure  144/86 H 134/74 H





[Right Brachial   





artery]   


 


O2 Saturation 98 97 98








                                     Oxygen











O2 Source [With Activity]      Room air


 


O2 Source                      Room air














I&O (Last 24 Hrs): 





                          Intake and Output Totals x24h











 06/03/23 06/04/23 06/05/23





 23:59 23:59 23:59


 


Intake Total 3410 2030 


 


Output Total 1836 4000 


 


Balance 1574 -1970 











General: Other (Lethargic)


HEENT: Mucous membr. moist/pink


Neck: Supple


Neuro: Other (Lethargic)


Cardiovascular: No murmurs


Respiratory: No respiratory distress


Abdomen: Soft


Extremities: No clubbing, No edema





- Results


Results: 





                               Laboratory Results











WBC  10.7 x10^3/uL (4.8-10.8)   06/04/23  04:39    


 


RBC  3.90 10^6/uL (4.70-6.10)  L  06/04/23  04:39    


 


Hgb  12.5 g/dL (14.0-18.0)  L  06/04/23  04:39    


 


Hct  38.8 % (42.0-52.0)  L  06/04/23  04:39    


 


MCV  99.5 fL (80.0-94.0)  H  06/04/23  04:39    


 


MCH  32.1 pg (27.0-31.0)  H  06/04/23  04:39    


 


MCHC  32.2 g/dL (32.0-36.0)   06/04/23  04:39    


 


RDW  13.5 % (12.0-15.0)   06/04/23  04:39    


 


Plt Count  330 10^3/uL (130-450)   06/04/23  04:39    


 


MPV  10.6 fL (7.4-11.4)   06/04/23  04:39    


 


Neut # (Auto)  6.5 10^3/uL (1.5-6.6)   06/04/23  04:39    


 


Lymph # (Auto)  2.4 10^3/uL (1.5-3.5)   06/04/23  04:39    


 


Mono # (Auto)  1.1 10^3/uL (0.0-1.0)  H  06/04/23  04:39    


 


Eos # (Auto)  0.5 10^3/uL (0.0-0.7)   06/04/23  04:39    


 


Baso # (Auto)  0.1 10^3/uL (0.0-0.1)   06/04/23  04:39    


 


Absolute Nucleated RBC  0.00 x10^3/uL  06/04/23  04:39    


 


Total Counted  100   05/31/23  18:25    


 


Band Neuts % (Manual)  0 % (0-10)  05/31/23  18:25    


 


Abnorm Lymph % (Manual)  0 %  05/31/23  18:25    


 


Nucleated RBC %  0.0 /100WBC  06/04/23  04:39    


 


Neutrophils # (Manual)  11.0 10^3/uL (1.5-6.6)  H  05/31/23  18:25    


 


Lymphocytes # (Manual)  1.4 10^3/uL (1.5-3.5)  L  05/31/23  18:25    


 


Monocytes # (Manual)  1.1 10^3/uL (0.0-1.0)  H  05/31/23  18:25    


 


Eosinophils # (Manual)  0.0 10^3/uL (0-0.7)   05/31/23  18:25    


 


Basophils # (Manual)  0.3 10^3/uL (0-0.1)  H  05/31/23  18:25    


 


Differential Comment  MANUAL DIFFERENTIAL   05/31/23  18:25    


 


Platelet Estimate  NORMAL (130-450,000)  (NORMAL)   05/31/23  18:25    


 


Platelet Morphology  NORMAL APPEARANCE  (NORMAL)   05/31/23  18:25    


 


RBC Morph Micro Appear  1+ MACROCYTOSIS  (NORMAL)   05/31/23  18:25    


 


Sodium  144 mmol/L (135-145)   06/03/23  04:46    


 


Potassium  4.1 mmol/L (3.5-5.0)   06/03/23  04:46    


 


Chloride  110 mmol/L (101-111)   06/03/23  04:46    


 


Carbon Dioxide  29 mmol/L (21-32)   06/03/23  04:46    


 


Anion Gap  5.0  (6-13)  L  06/03/23  04:46    


 


BUN  7 mg/dL (6-20)   06/03/23  04:46    


 


Creatinine  0.7 mg/dL (0.6-1.2)   06/03/23  04:46    


 


Estimated GFR (MDRD)  109  (>89)   06/03/23  04:46    


 


Glucose  83 mg/dL ()   06/03/23  04:46    


 


Lactic Acid  1.6 mmol/L (0.5-2.2)   05/31/23  18:25    


 


Calcium  8.6 mg/dL (8.5-10.3)   06/03/23  04:46    


 


Total Bilirubin  0.5 mg/dL (0.2-1.0)   06/03/23  04:46    


 


AST  14 IU/L (10-42)   06/03/23  04:46    


 


ALT  12 IU/L (10-60)   06/03/23  04:46    


 


Alkaline Phosphatase  42 IU/L ()   06/03/23  04:46    


 


Total Protein  6.2 g/dL (6.7-8.2)  L  06/03/23  04:46    


 


Albumin  2.7 g/dL (3.2-5.5)  L  06/03/23  04:46    


 


Globulin  3.5 g/dL (2.1-4.2)   06/03/23  04:46    


 


Albumin/Globulin Ratio  0.8  (1.0-2.2)  L  06/03/23  04:46    


 


Procalcitonin  < 0.05 ng/mL (<0.5)   05/31/23  18:25    


 


Urine Color  RED/BLOODY   05/31/23  18:40    


 


Urine Clarity  TURBID  (CLEAR)   05/31/23  18:40    


 


Urine pH  6.5 PH (5.0-7.5)   05/31/23  18:40    


 


Ur Specific Gravity  1.025  (1.002-1.030)   05/31/23  18:40    


 


Urine Protein  >=300 mg/dL (NEGATIVE)  H  05/31/23  18:40    


 


Urine Glucose (UA)  NEGATIVE mg/dL (NEGATIVE)   05/31/23  18:40    


 


Urine Ketones  TRACE mg/dL (NEGATIVE)   05/31/23  18:40    


 


Urine Occult Blood  LARGE  (NEGATIVE)  H  05/31/23  18:40    


 


Urine Nitrite  POSITIVE  (NEGATIVE)  H  05/31/23  18:40    


 


Urine Bilirubin  NEGATIVE  (NEGATIVE)   05/31/23  18:40    


 


Urine Urobilinogen  1 (NORMAL) E.U./dL (NORMAL)   05/31/23  18:40    


 


Ur Leukocyte Esterase  TRACE  (NEGATIVE)  H  05/31/23  18:40    


 


Urine RBC  TNTC /HPF (0-5)  H  05/31/23  18:40    


 


Urine WBC  4-5 /HPF (0-3)   05/31/23  18:40    


 


Ur Squamous Epith Cells  NONE SEEN  (<= Few)   05/31/23  18:40    


 


Amorphous Sediment  Moderate /LPF  05/31/23  18:40    


 


Urine Bacteria  Few /HPF (None Seen)   05/31/23  18:40    


 


Ur Microscopic Review  INDICATED   05/31/23  18:40    


 


Urine Culture Comments  INDICATED   05/31/23  18:40    


 


Nasal Adenovirus (PCR)  NOT DETECTED   05/31/23  20:15    


 


Nasal B. parapertussis DNA (PCR)  NOT DETECTED   05/31/23  20:15    


 


Nasal Coronavir 229E PCR  NOT DETECTED   05/31/23  20:15    


 


Nasal Coronavir HKU1 PCR  NOT DETECTED   05/31/23  20:15    


 


Nasal Coronavir NL63 PCR  NOT DETECTED   05/31/23  20:15    


 


Nasal Coronavir OC43 PCR  NOT DETECTED   05/31/23  20:15    


 


Nasal Enterovir/Rhinovir PCR  NOT DETECTED   05/31/23  20:15    


 


Nasal Influenza B PCR  NOT DETECTED   05/31/23  20:15    


 


Nasal Influenza A PCR  NOT DETECTED   05/31/23  20:15    


 


Nasal Parainfluen 1 PCR  NOT DETECTED   05/31/23  20:15    


 


Nasal Parainfluen 2 PCR  NOT DETECTED   05/31/23  20:15    


 


Nasal Parainfluen 3 PCR  NOT DETECTED   05/31/23  20:15    


 


Nasal Parainfluen 4 PCR  NOT DETECTED   05/31/23  20:15    


 


Nasal RSV (PCR)  NOT DETECTED   05/31/23  20:15    


 


Nasal B.pertussis DNA PCR  NOT DETECTED   05/31/23  20:15    


 


Nasal C.pneumoniae (PCR)  NOT DETECTED   05/31/23  20:15    


 


Pranav Human Metapneumo PCR  NOT DETECTED   05/31/23  20:15    


 


Nasal M.pneumoniae (PCR)  NOT DETECTED   05/31/23  20:15    


 


Nasal SARS-CoV-2 (PCR)  NOT DETECTED   05/31/23  20:15    














Sepsis Event Note (H)





- Evaluation


Current Stage of Sepsis: Sepsis


Possible source of Sepsis: positive: Unknown





- Sepsis Criteria


Sepsis Criteria: Recorded Heart Rate greater than 90 bpm, WBC count greater than

10% bands, WBC count greater than 12,000 or less than 4000

## 2023-06-06 LAB
ANION GAP SERPL CALCULATED.4IONS-SCNC: 7 MMOL/L (ref 6–13)
BASOPHILS NFR BLD AUTO: 0.1 10^3/UL (ref 0–0.1)
BASOPHILS NFR BLD AUTO: 0.7 %
BUN SERPL-MCNC: 12 MG/DL (ref 6–20)
CALCIUM UR-MCNC: 8.9 MG/DL (ref 8.5–10.3)
CHLORIDE SERPL-SCNC: 107 MMOL/L (ref 101–111)
CO2 SERPL-SCNC: 29 MMOL/L (ref 21–32)
CREAT SERPLBLD-SCNC: 0.8 MG/DL (ref 0.6–1.2)
EOSINOPHIL # BLD AUTO: 0.7 10^3/UL (ref 0–0.7)
EOSINOPHIL NFR BLD AUTO: 8.6 %
ERYTHROCYTE [DISTWIDTH] IN BLOOD BY AUTOMATED COUNT: 13.2 % (ref 12–15)
GFRSERPLBLD MDRD-ARVRAT: 93 ML/MIN/{1.73_M2} (ref 89–?)
GLUCOSE SERPL-MCNC: 83 MG/DL (ref 70–100)
HCT VFR BLD AUTO: 37.3 % (ref 42–52)
HGB UR QL STRIP: 12.1 G/DL (ref 14–18)
LYMPHOCYTES # SPEC AUTO: 2 10^3/UL (ref 1.5–3.5)
LYMPHOCYTES NFR BLD AUTO: 23.9 %
MAGNESIUM SERPL-MCNC: 2.2 MG/DL (ref 1.7–2.8)
MCH RBC QN AUTO: 31.9 PG (ref 27–31)
MCHC RBC AUTO-ENTMCNC: 32.4 G/DL (ref 32–36)
MCV RBC AUTO: 98.4 FL (ref 80–94)
MONOCYTES # BLD AUTO: 0.9 10^3/UL (ref 0–1)
MONOCYTES NFR BLD AUTO: 10.1 %
NEUTROPHILS # BLD AUTO: 4.7 10^3/UL (ref 1.5–6.6)
NEUTROPHILS # SNV AUTO: 8.4 X10^3/UL (ref 4.8–10.8)
NEUTROPHILS NFR BLD AUTO: 56.5 %
NRBC # BLD AUTO: 0 /100WBC
NRBC # BLD AUTO: 0 X10^3/UL
PDW BLD AUTO: 11.3 FL (ref 7.4–11.4)
PLATELET # BLD: 309 10^3/UL (ref 130–450)
POTASSIUM SERPL-SCNC: 3.5 MMOL/L (ref 3.5–5)
RBC MAR: 3.79 10^6/UL (ref 4.7–6.1)
SODIUM SERPLBLD-SCNC: 143 MMOL/L (ref 135–145)

## 2023-06-06 RX ADMIN — ACETAMINOPHEN PRN MG: 325 TABLET ORAL at 14:52

## 2023-06-06 RX ADMIN — ACETAMINOPHEN PRN MG: 325 TABLET ORAL at 05:36

## 2023-06-06 RX ADMIN — SODIUM CHLORIDE, PRESERVATIVE FREE SCH: 5 INJECTION INTRAVENOUS at 16:08

## 2023-06-06 RX ADMIN — ACETAMINOPHEN PRN MG: 325 TABLET ORAL at 18:54

## 2023-06-06 RX ADMIN — FINASTERIDE SCH MG: 5 TABLET, FILM COATED ORAL at 09:24

## 2023-06-06 RX ADMIN — ACETAMINOPHEN PRN MG: 325 TABLET ORAL at 10:11

## 2023-06-06 RX ADMIN — SODIUM CHLORIDE, PRESERVATIVE FREE SCH: 5 INJECTION INTRAVENOUS at 10:12

## 2023-06-06 RX ADMIN — Medication SCH TAB: at 12:00

## 2023-06-06 RX ADMIN — NYSTATIN SCH APPLIC: 100000 POWDER TOPICAL at 09:24

## 2023-06-06 RX ADMIN — NYSTATIN SCH APPLIC: 100000 POWDER TOPICAL at 20:25

## 2023-06-06 RX ADMIN — LIDOCAINE SCH PATCH: 50 PATCH TOPICAL at 09:24

## 2023-06-06 RX ADMIN — APIXABAN SCH MG: 5 TABLET, FILM COATED ORAL at 09:24

## 2023-06-06 RX ADMIN — SODIUM CHLORIDE, PRESERVATIVE FREE SCH: 5 INJECTION INTRAVENOUS at 23:11

## 2023-06-06 RX ADMIN — APIXABAN SCH MG: 5 TABLET, FILM COATED ORAL at 20:25

## 2023-06-06 RX ADMIN — FERRIC OXIDE RED PRN APPLIC: 8; 8 LOTION TOPICAL at 14:52

## 2023-06-06 RX ADMIN — OXYCODONE PRN MG: 5 TABLET ORAL at 20:24

## 2023-06-06 RX ADMIN — FERRIC OXIDE RED PRN APPLIC: 8; 8 LOTION TOPICAL at 10:14

## 2023-06-06 RX ADMIN — TAMSULOSIN HYDROCHLORIDE SCH MG: 0.4 CAPSULE ORAL at 20:25

## 2023-06-06 RX ADMIN — SODIUM CHLORIDE, PRESERVATIVE FREE SCH ML: 5 INJECTION INTRAVENOUS at 05:13

## 2023-06-06 RX ADMIN — TAMSULOSIN HYDROCHLORIDE SCH MG: 0.4 CAPSULE ORAL at 09:24

## 2023-06-06 RX ADMIN — Medication SCH MG: at 09:24

## 2023-06-06 NOTE — PROVIDER PROGRESS NOTE
Assessment/Plan





- Problem List


(1) UTI (urinary tract infection)


Qualifiers: 


   Urinary tract infection type: acute cystitis   Hematuria presence: without 

hematuria   Qualified Code(s): N30.00 - Acute cystitis without hematuria   


Assessment/Plan: 


 Urinary tract infection type: acute cystitis   Hematuria presence: without 

hematuria   Qualified Code(s): N30.00 - Acute cystitis without hematuria   


Assessment/Plan: 


This patient's recent history and antibiotic use is complex however: He had a 

UTI that was being treated with Cipro, and on the urine cx from 5/19/23, he grew

E coli and Proteus mirabilis. I think he had not completed the Cipro course of 

treatment. Then he came to the ER on 5/29 due to hematuria, and blood and ur cx 

were drawn, and the ED provider sent him home on Cefpodoxime. Then that 5/29 bld

cx turned pos for Staph epi, felt to be a contaminant, but he was called on 5/31

and his wife reported he was worse. He came in now on 5/31 and had a fever on 

6/1. From this latest ER presentation, his urine cx has no growth and bld cx hav

e no growth.


The admitting telemedicine doctor felt he had sepsis caused by a UTI.  He 

received Cefepime in the ED and has been continued on empiric iv Ceftriaxone 

daily.


I reviewed this entire sequence of events, results and antibx used with our 

pharmacist.  He probably has a semitreated UTI.  I reviewed all labs. Since 

during this admission his white bld count has gone from 11.2 >> 13.7 >> 9.8 >> 

11.4 >> 10.7 yesterday>> 10.1 today, I kept him on iv Ceftriaxone, which the E 

coli and Proteus are sens to. 


IVF were stopped.


Plan:


Will change ceftriaxone IV daily to oral Levaquin, based on the sensitivity 

profiles of the E coli and Proteus. This choice was reviewed with jan Estevez today. I will plan a 21-day total course of antibiotics for good prostate

penetration. He has gotten 6 days of iv antibx, so 15 more days using Levaquin 

once daily, plus a probiotic. Today is Day 1 of 15 days.


I want to assess him after at least 24 hours of being on an oral antibiotic to 

assure that there is no return of fever or rising WBC.


I reviewed all the above with the daughter and daughter-in-law at bedside today





(2) Acute urinary retention


His home prostate meds Tamsolusin and Finasteride are being given here.


This patient has had several episodes of urinary retention greater than 1 L. He 

had a Pineda in which was discontinued 2 days ago after bladder training.  

However, again last night Telemedicine doctor was called because the patient had

2 L found on bladder scanning and Pineda catheter insertion was ordered. 


Because of his dementia he has tried to pull out his Pineda, when he last had it,

therefore soft restraints of upper extremities were needed


Plan:


Unfortunately we again have to order upper extremity restraints so he does not 

pull out his Pineda. 


The other alternative is to not use a chronic Pineda, but to schedule straight 

cathing for him possibly twice daily or at least daily, [facility]. SW checked if that center would do straight caths and they cannot. 

I discussed this with the wife at bedside, and daughter-in-law present.





June 6, 2023-we will straight cath every 6 hours due to significant urinary 

retention this will be as needed postvoid residual greater than 300.  Will need 

SNF placement





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


On 6/4 the wife told me that the patient was able to walk without walker until 

about 2 weeks ago.  Then he got weak and has not been able to stand. The patient

now needs two person assist to stand or move, he thinks he is falling, he did 

not know what to do with his utensils to feed himself


I resumed his home med Quietipine


PT and OT evaluated him but he is not a candidate for rehab at a SNF since he is

not directable due to his dementia. I updated the wife about this today, and 

said that he now may be bedbound or need to be put in a lift to be transferred 

from bed to a chair. She understood.


The wife confirmed that she wants [facility] to [facility]. 


SW checked if that center would do straight caths and they cannot. I discussed 

this with the wife at bedside, and daughter-in-law present.





(4) HTN


This is a new diagnosis for him.  His BP is as high as 180s over 100s. His nurse

suspected that it was because he has poor pain control.  He cannot tell us where

his pain is because of his dementia


As needed IV Hydralazine was ordered by the telemedicine doctor


I addressed his pain control and started him on Amlodipine 5 mg daily 


Plan:


Continue Amlodipine daily





(5) Low back pain


I spoke to his wife on 6/4 , asking where the source of his pain usually is and 

she said she thinks it's his lower back. I informed her that using narcotics for

pain control will definitely add to his confusion and somnolence. I ordered a 

Lidocaine patch scheduled daily, not prn


Plan:


Continue pain control with a scheduled Lidocaine patch


I have decreased his oxycodone from 5 mg every 6 hours prn to 2.5 mg every 8 

hours prn.





(6) Lethargy


Only since he has gotten morphine or oxycodone has he been hypersomnolent.


Plan:


I will decrease his narcotic pain med dose oxycodone from 5 mg every 6 hours prn

to 2.5 mg every 8 hours prn.





(7) Chronic A-fib


Plan:


The patient was on Pradaxa, and a DOAC has been continued here.


He has not been ordered to have telemetry (since I expected him to try to pull 

the patches off too), but his vital signs show stable heart rate





(8) Sepsis


RESOLVED


Patient had criteria for sepsis: White blood count 13.7, fever of 38.0 at 

midnight, tachycardic with heart rate of 100 at rest. IVF were stopped.


Plan:


Continue with antibx














- Current Meds


Current Meds: 





                               Current Medications











Generic Name Dose Route Start Last Admin





  Trade Name Ancelmoq  PRN Reason Stop Dose Admin


 


Acetaminophen  650 mg  05/31/23 19:45  06/06/23 14:52





  Acetaminophen 325 Mg Tablet  PO   650 mg





  Q4HR PRN   Administration





  Pain 1 to 4, or Fever  


 


Amlodipine Besylate  5 mg  06/04/23 09:00  06/06/23 09:24





  Amlodipine 5 Mg Tablet  PO   5 mg





  DAILY SEMAJ   Administration


 


Apixaban  5 mg  06/01/23 09:00  06/06/23 09:24





  Apixaban 5 Mg Tablet  PO   5 mg





  BID SEMAJ   Administration


 


Calamine  1 applic  06/05/23 17:18  06/06/23 14:52





  Calamine/Zinc Oxide 177 Ml Bottle  TOP   1 applic





  PRN PRN   Administration





  SKIN CARE  


 


Finasteride  5 mg  06/01/23 09:00  06/06/23 09:24





  Finasteride 5 Mg Tablet  PO   5 mg





  DAILY SEMAJ   Administration


 


Levofloxacin  500 mg  06/06/23 09:00  06/06/23 09:24





  Levofloxacin 250 Mg Tablet  PO   500 mg





  DAILY SEMAJ   Administration


 


Lidocaine  1 patch  06/05/23 09:00  06/06/23 09:24





  Lidocaine Patch 5%  TOP   1 patch





  DAILY SEMAJ   Administration


 


Multi-Ingredient Ointment  1 applic  06/01/23 21:11  06/02/23 08:35





  Zinc Oxide 20% Oint 30 Gm Tube  TOP   1 applic





  PRN PRN   Administration





  Skin Care  


 


Multivitamins/Minerals  1 tab  06/05/23 17:00  06/06/23 12:00





  Multivitamin W/Minerals Tablet  PO   1 tab





  QDLUNCH SEMAJ   Administration


 


Nystatin  1 applic  06/02/23 21:00  06/06/23 09:24





  Nystatin Powder 15 Gm  TOP   1 applic





  BID SEMAJ   Administration


 


Polyethylene Glycol  17 gm  06/06/23 12:00  06/06/23 11:59





  Polyethylene Glycol 3350 17 Gm Packet  PO   17 gm





  DAILY SEMAJ   Administration


 


Quetiapine Fumarate  75 mg  06/02/23 20:00  06/05/23 19:49





  Quetiapine 25 Mg Tablet  PO   75 mg





  2000 SEMAJ   Administration


 


Saccharomyces Boulardii  250 mg  06/06/23 09:00  06/06/23 09:24





  Saccharomyces Boulardii 250 Mg Capsule  PO   250 mg





  DAILY SEMAJ   Administration


 


Sodium Chloride  10 ml  06/01/23 01:00  06/06/23 16:08





  Sodium Chloride Flush 0.9% 10 Ml Syringe  IVP   Not Given





  0100,[id number],1700 [facility]  


 


Tamsulosin HCl  0.4 mg  06/02/23 21:00  06/06/23 09:24





  Tamsulosin 0.4 Mg Capsule  PO   0.4 mg





  BID SEMAJ   Administration














- Lab Result


Fish Bone Diagrams: 


                                 06/06/23 05:02





                                 06/06/23 05:02





- Additional Planning


My Orders: 





My Active Orders





06/06/23 12:00


polyethylene glycoL 3350 [Miralax]   17 gm PO DAILY 














Objective


Vital Signs: 





                               Vital Signs - 24 hr











  06/06/23 06/06/23 06/06/23





  00:35 08:00 16:05


 


Temperature 36.4 C L 36.2 C L 36.7 C


 


Heart Rate [  79 78





Brachial]   


 


Heart Rate [ 74  





Radial]   


 


Respiratory 12 16 18





Rate   


 


Blood Pressure 139/78 H 153/69 H 139/75 H





[Right Brachial   





artery]   


 


O2 Saturation 97 100 99








                                     Oxygen











O2 Source [With Activity]      Room air


 


O2 Source                      Room air














I&O (Last 24 Hrs): 





                          Intake and Output Totals x24h











 06/04/23 06/05/23 06/06/23





 23:59 23:59 23:59


 


Intake Total 2030 1580 950


 


Output Total 4000 3870 3300


 


Balance -1970 -2290 -2430











General: Alert, Other (Confused)


HEENT: Atraumatic


Neck: Supple


Neuro: Alert, Disoriented


Cardiovascular: Regular rate, Normal S1, Normal S2


Respiratory: Chest non-tender, Breath sounds nml


Abdomen: Normal bowel sounds, Soft





- Results


Results: 





                               Laboratory Results











WBC  8.4 x10^3/uL (4.8-10.8)   06/06/23  05:02    


 


RBC  3.79 10^6/uL (4.70-6.10)  L  06/06/23  05:02    


 


Hgb  12.1 g/dL (14.0-18.0)  L  06/06/23  05:02    


 


Hct  37.3 % (42.0-52.0)  L  06/06/23  05:02    


 


MCV  98.4 fL (80.0-94.0)  H  06/06/23  05:02    


 


MCH  31.9 pg (27.0-31.0)  H  06/06/23  05:02    


 


MCHC  32.4 g/dL (32.0-36.0)   06/06/23  05:02    


 


RDW  13.2 % (12.0-15.0)   06/06/23  05:02    


 


Plt Count  309 10^3/uL (130-450)   06/06/23  05:02    


 


MPV  11.3 fL (7.4-11.4)   06/06/23  05:02    


 


Neut # (Auto)  4.7 10^3/uL (1.5-6.6)   06/06/23  05:02    


 


Lymph # (Auto)  2.0 10^3/uL (1.5-3.5)   06/06/23  05:02    


 


Mono # (Auto)  0.9 10^3/uL (0.0-1.0)   06/06/23  05:02    


 


Eos # (Auto)  0.7 10^3/uL (0.0-0.7)   06/06/23  05:02    


 


Baso # (Auto)  0.1 10^3/uL (0.0-0.1)   06/06/23  05:02    


 


Absolute Nucleated RBC  0.00 x10^3/uL  06/06/23  05:02    


 


Total Counted  100   05/31/23  18:25    


 


Band Neuts % (Manual)  0 % (0-10)  05/31/23  18:25    


 


Abnorm Lymph % (Manual)  0 %  05/31/23  18:25    


 


Nucleated RBC %  0.0 /100WBC  06/06/23  05:02    


 


Neutrophils # (Manual)  11.0 10^3/uL (1.5-6.6)  H  05/31/23  18:25    


 


Lymphocytes # (Manual)  1.4 10^3/uL (1.5-3.5)  L  05/31/23  18:25    


 


Monocytes # (Manual)  1.1 10^3/uL (0.0-1.0)  H  05/31/23  18:25    


 


Eosinophils # (Manual)  0.0 10^3/uL (0-0.7)   05/31/23  18:25    


 


Basophils # (Manual)  0.3 10^3/uL (0-0.1)  H  05/31/23  18:25    


 


Differential Comment  MANUAL DIFFERENTIAL   05/31/23  18:25    


 


Platelet Estimate  NORMAL (130-450,000)  (NORMAL)   05/31/23  18:25    


 


Platelet Morphology  NORMAL APPEARANCE  (NORMAL)   05/31/23  18:25    


 


RBC Morph Micro Appear  1+ MACROCYTOSIS  (NORMAL)   05/31/23  18:25    


 


Sodium  143 mmol/L (135-145)   06/06/23  05:02    


 


Potassium  3.5 mmol/L (3.5-5.0)   06/06/23  05:02    


 


Chloride  107 mmol/L (101-111)   06/06/23  05:02    


 


Carbon Dioxide  29 mmol/L (21-32)   06/06/23  05:02    


 


Anion Gap  7.0  (6-13)   06/06/23  05:02    


 


BUN  12 mg/dL (6-20)   06/06/23  05:02    


 


Creatinine  0.8 mg/dL (0.6-1.2)   06/06/23  05:02    


 


Estimated GFR (MDRD)  93  (>89)   06/06/23  05:02    


 


Glucose  83 mg/dL ()   06/06/23  05:02    


 


Lactic Acid  1.6 mmol/L (0.5-2.2)   05/31/23  18:25    


 


Calcium  8.9 mg/dL (8.5-10.3)   06/06/23  05:02    


 


Magnesium  2.2 mg/dL (1.7-2.8)   06/06/23  05:02    


 


Total Bilirubin  0.5 mg/dL (0.2-1.0)   06/03/23  04:46    


 


AST  14 IU/L (10-42)   06/03/23  04:46    


 


ALT  12 IU/L (10-60)   06/03/23  04:46    


 


Alkaline Phosphatase  42 IU/L ()   06/03/23  04:46    


 


Total Protein  6.2 g/dL (6.7-8.2)  L  06/03/23  04:46    


 


Albumin  2.7 g/dL (3.2-5.5)  L  06/03/23  04:46    


 


Globulin  3.5 g/dL (2.1-4.2)   06/03/23  04:46    


 


Albumin/Globulin Ratio  0.8  (1.0-2.2)  L  06/03/23  04:46    


 


Vitamin B12  219 pg/mL (180-914)   06/05/23  08:45    


 


Procalcitonin  < 0.05 ng/mL (<0.5)   05/31/23  18:25    


 


Urine Color  RED/BLOODY   05/31/23  18:40    


 


Urine Clarity  TURBID  (CLEAR)   05/31/23  18:40    


 


Urine pH  6.5 PH (5.0-7.5)   05/31/23  18:40    


 


Ur Specific Gravity  1.025  (1.002-1.030)   05/31/23  18:40    


 


Urine Protein  >=300 mg/dL (NEGATIVE)  H  05/31/23  18:40    


 


Urine Glucose (UA)  NEGATIVE mg/dL (NEGATIVE)   05/31/23  18:40    


 


Urine Ketones  TRACE mg/dL (NEGATIVE)   05/31/23  18:40    


 


Urine Occult Blood  LARGE  (NEGATIVE)  H  05/31/23  18:40    


 


Urine Nitrite  POSITIVE  (NEGATIVE)  H  05/31/23  18:40    


 


Urine Bilirubin  NEGATIVE  (NEGATIVE)   05/31/23  18:40    


 


Urine Urobilinogen  1 (NORMAL) E.U./dL (NORMAL)   05/31/23  18:40    


 


Ur Leukocyte Esterase  TRACE  (NEGATIVE)  H  05/31/23  18:40    


 


Urine RBC  TNTC /HPF (0-5)  H  05/31/23  18:40    


 


Urine WBC  4-5 /HPF (0-3)   05/31/23  18:40    


 


Ur Squamous Epith Cells  NONE SEEN  (<= Few)   05/31/23  18:40    


 


Amorphous Sediment  Moderate /LPF  05/31/23  18:40    


 


Urine Bacteria  Few /HPF (None Seen)   05/31/23  18:40    


 


Ur Microscopic Review  INDICATED   05/31/23  18:40    


 


Urine Culture Comments  INDICATED   05/31/23  18:40    


 


Nasal Adenovirus (PCR)  NOT DETECTED   05/31/23  20:15    


 


Nasal B. parapertussis DNA (PCR)  NOT DETECTED   05/31/23  20:15    


 


Nasal Coronavir 229E PCR  NOT DETECTED   05/31/23  20:15    


 


Nasal Coronavir HKU1 PCR  NOT DETECTED   05/31/23  20:15    


 


Nasal Coronavir NL63 PCR  NOT DETECTED   05/31/23  20:15    


 


Nasal Coronavir OC43 PCR  NOT DETECTED   05/31/23  20:15    


 


Nasal Enterovir/Rhinovir PCR  NOT DETECTED   05/31/23  20:15    


 


Nasal Influenza B PCR  NOT DETECTED   05/31/23  20:15    


 


Nasal Influenza A PCR  NOT DETECTED   05/31/23  20:15    


 


Nasal Parainfluen 1 PCR  NOT DETECTED   05/31/23  20:15    


 


Nasal Parainfluen 2 PCR  NOT DETECTED   05/31/23  20:15    


 


Nasal Parainfluen 3 PCR  NOT DETECTED   05/31/23  20:15    


 


Nasal Parainfluen 4 PCR  NOT DETECTED   05/31/23  20:15    


 


Nasal RSV (PCR)  NOT DETECTED   05/31/23  20:15    


 


Nasal B.pertussis DNA PCR  NOT DETECTED   05/31/23  20:15    


 


Nasal C.pneumoniae (PCR)  NOT DETECTED   05/31/23  20:15    


 


Pranav Human Metapneumo PCR  NOT DETECTED   05/31/23  20:15    


 


Nasal M.pneumoniae (PCR)  NOT DETECTED   05/31/23  20:15    


 


Nasal SARS-CoV-2 (PCR)  NOT DETECTED   05/31/23  20:15    














Sepsis Event Note (H)





- Evaluation


Current Stage of Sepsis: Sepsis


Possible source of Sepsis: positive: Unknown





- Sepsis Criteria


Sepsis Criteria: Recorded Heart Rate greater than 90 bpm, WBC count greater than

10% bands, WBC count greater than 12,000 or less than 4000

## 2023-06-07 RX ADMIN — TAMSULOSIN HYDROCHLORIDE SCH MG: 0.4 CAPSULE ORAL at 20:03

## 2023-06-07 RX ADMIN — SODIUM CHLORIDE, PRESERVATIVE FREE SCH: 5 INJECTION INTRAVENOUS at 15:47

## 2023-06-07 RX ADMIN — LIDOCAINE SCH PATCH: 50 PATCH TOPICAL at 10:12

## 2023-06-07 RX ADMIN — APIXABAN SCH MG: 5 TABLET, FILM COATED ORAL at 20:03

## 2023-06-07 RX ADMIN — TAMSULOSIN HYDROCHLORIDE SCH MG: 0.4 CAPSULE ORAL at 10:11

## 2023-06-07 RX ADMIN — SODIUM CHLORIDE, PRESERVATIVE FREE SCH: 5 INJECTION INTRAVENOUS at 23:49

## 2023-06-07 RX ADMIN — SODIUM CHLORIDE, PRESERVATIVE FREE SCH: 5 INJECTION INTRAVENOUS at 10:11

## 2023-06-07 RX ADMIN — NYSTATIN SCH APPLIC: 100000 POWDER TOPICAL at 10:12

## 2023-06-07 RX ADMIN — APIXABAN SCH MG: 5 TABLET, FILM COATED ORAL at 10:12

## 2023-06-07 RX ADMIN — NYSTATIN SCH APPLIC: 100000 POWDER TOPICAL at 19:20

## 2023-06-07 RX ADMIN — FINASTERIDE SCH MG: 5 TABLET, FILM COATED ORAL at 10:11

## 2023-06-07 RX ADMIN — OXYCODONE PRN MG: 5 TABLET ORAL at 22:16

## 2023-06-07 RX ADMIN — Medication SCH MG: at 10:11

## 2023-06-07 RX ADMIN — FERRIC OXIDE RED PRN APPLIC: 8; 8 LOTION TOPICAL at 17:36

## 2023-06-07 RX ADMIN — Medication SCH TAB: at 12:39

## 2023-06-07 RX ADMIN — ACETAMINOPHEN PRN MG: 325 TABLET ORAL at 19:19

## 2023-06-07 RX ADMIN — CYANOCOBALAMIN TAB 500 MCG SCH MCG: 500 TAB at 10:11

## 2023-06-07 NOTE — PROVIDER PROGRESS NOTE
Progress Note





Assessment/Plan





- Problem List


(1) UTI (urinary tract infection)


Qualifiers: 


   Urinary tract infection type: acute cystitis   Hematuria presence: without 

hematuria   Qualified Code(s): N30.00 - Acute cystitis without hematuria   


Assessment/Plan: 


 Urinary tract infection type: acute cystitis   Hematuria presence: without 

hematuria   Qualified Code(s): N30.00 - Acute cystitis without hematuria   


Assessment/Plan: 


This patient's recent history and antibiotic use is complex however: He had a 

UTI that was being treated with Cipro, and on the urine cx from 5/19/23, he grew

E coli and Proteus mirabilis. I think he had not completed the Cipro course of 

treatment. Then he came to the ER on 5/29 due to hematuria, and blood and ur cx 

were drawn, and the ED provider sent him home on Cefpodoxime. Then that 5/29 bld

cx turned pos for Staph epi, felt to be a contaminant, but he was called on 5/31

and his wife reported he was worse. He came in now on 5/31 and had a fever on 

6/1. From this latest ER presentation, his urine cx has no growth and bld cx 

have no growth.


The admitting telemedicine doctor felt he had sepsis caused by a UTI.  He 

received Cefepime in the ED and has been continued on empiric iv Ceftriaxone 

daily.


I reviewed this entire sequence of events, results and antibx used with our 

pharmacist.  He probably has a semitreated UTI.  I reviewed all labs. Since 

during this admission his white bld count has gone from 11.2 >> 13.7 >> 9.8 >> 

11.4 >> 10.7 yesterday>> 10.1 today, I kept him on iv Ceftriaxone, which the E 

coli and Proteus are sens to. 


IVF were stopped.


Plan:


Will change ceftriaxone IV daily to oral Levaquin, based on the sensitivity 

profiles of the E coli and Proteus. This choice was reviewed with jan Estevez today. I will plan a 21-day total course of antibiotics for good prostate

penetration. He has gotten 6 days of iv antibx, so 15 more days using Levaquin 

once daily, plus a probiotic. Today is Day 1 of 15 days.


I want to assess him after at least 24 hours of being on an oral antibiotic to 

assure that there is no return of fever or rising WBC.


I reviewed all the above with the daughter and daughter-in-law at bedside today





(2) Acute urinary retention


His home prostate meds Tamsolusin and Finasteride are being given here.


This patient has had several episodes of urinary retention greater than 1 L. He 

had a Pineda in which was discontinued 2 days ago after bladder training.  

However, again last night Telemedicine doctor was called because the patient had

2 L found on bladder scanning and Pineda catheter insertion was ordered. 


Because of his dementia he has tried to pull out his Pineda, when he last had it,

therefore soft restraints of upper extremities were needed


Plan:


Unfortunately we again have to order upper extremity restraints so he does not 

pull out his Pineda. 


The other alternative is to not use a chronic Pineda, but to schedule straight 

cathing for him possibly twice daily or at least daily, [facility]. SW checked if that center would do straight caths and they cannot. 

I discussed this with the wife at bedside, and daughter-in-law present.





June 6, 2023-we will straight cath every 6 hours due to significant urinary 

retention this will be as needed postvoid residual greater than 300.  Will need 

SNF placement





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


On 6/4 the wife told me that the patient was able to walk without walker until 

about 2 weeks ago.  Then he got weak and has not been able to stand. The patient

now needs two person assist to stand or move, he thinks he is falling, he did 

not know what to do with his utensils to feed himself


I resumed his home med Quietipine


PT and OT evaluated him but he is not a candidate for rehab at a SNF since he is

not directable due to his dementia. I updated the wife about this today, and 

said that he now may be bedbound or need to be put in a lift to be transferred 

from bed to a chair. She understood.


The wife confirmed that she wants go to [facility]. 


SW checked if that center would do straight caths and they cannot. I discussed 

this with the wife at bedside, and daughter-in-law present.





(4) HTN


This is a new diagnosis for him.  His BP is as high as 180s over 100s. His nurse

suspected that it was because he has poor pain control.  He cannot tell us where

his pain is because of his dementia


As needed IV Hydralazine was ordered by the telemedicine doctor


I addressed his pain control and started him on Amlodipine 5 mg daily 


Plan:


Continue Amlodipine daily





(5) Low back pain


I spoke to his wife on 6/4 , asking where the source of his pain usually is and 

she said she thinks it's his lower back. I informed her that using narcotics for

pain control will definitely add to his confusion and somnolence. I ordered a 

Lidocaine patch scheduled daily, not prn


Plan:


Continue pain control with a scheduled Lidocaine patch


I have decreased his oxycodone from 5 mg every 6 hours prn to 2.5 mg every 8 

hours prn.





(6) Lethargy


Only since he has gotten morphine or oxycodone has he been hypersomnolent.


Plan:


I will decrease his narcotic pain med dose oxycodone from 5 mg every 6 hours prn

to 2.5 mg every 8 hours prn.





(7) Chronic A-fib


Plan:


The patient was on Pradaxa, and a DOAC has been continued here.


He has not been ordered to have telemetry (since I expected him to try to pull 

the patches off too), but his vital signs show stable heart rate





(8) Sepsis


RESOLVED


Patient had criteria for sepsis: White blood count 13.7, fever of 38.0 at 

midnight, tachycardic with heart rate of 100 at rest. IVF were stopped.


Plan:


Continue with antibx














- Current Meds


Current Meds: 





                               Current Medications











Generic Name Dose Route Start Last Admin





  Trade Name Freq  PRN Reason Stop Dose Admin


 


Acetaminophen  650 mg  05/31/23 19:45  06/06/23 14:52





  Acetaminophen 325 Mg Tablet  PO   650 mg





  Q4HR PRN   Administration





  Pain 1 to 4, or Fever  


 


Amlodipine Besylate  5 mg  06/04/23 09:00  06/06/23 09:24





  Amlodipine 5 Mg Tablet  PO   5 mg





  DAILY SEMAJ   Administration


 


Apixaban  5 mg  06/01/23 09:00  06/06/23 09:24





  Apixaban 5 Mg Tablet  PO   5 mg





  BID SEMAJ   Administration


 


Calamine  1 applic  06/05/23 17:18  06/06/23 14:52





  Calamine/Zinc Oxide 177 Ml Bottle  TOP   1 applic





  PRN PRN   Administration





  SKIN CARE  


 


Finasteride  5 mg  06/01/23 09:00  06/06/23 09:24





  Finasteride 5 Mg Tablet  PO   5 mg





  DAILY SEMAJ   Administration


 


Levofloxacin  500 mg  06/06/23 09:00  06/06/23 09:24





  Levofloxacin 250 Mg Tablet  PO   500 mg





  DAILY SEMAJ   Administration


 


Lidocaine  1 patch  06/05/23 09:00  06/06/23 09:24





  Lidocaine Patch 5%  TOP   1 patch





  DAILY SEMAJ   Administration


 


Multi-Ingredient Ointment  1 applic  06/01/23 21:11  06/02/23 08:35





  Zinc Oxide 20% Oint 30 Gm Tube  TOP   1 applic





  PRN PRN   Administration





  Skin Care  


 


Multivitamins/Minerals  1 tab  06/05/23 17:00  06/06/23 12:00





  Multivitamin W/Minerals Tablet  PO   1 tab





  QDLUNCH SEMAJ   Administration


 


Nystatin  1 applic  06/02/23 21:00  06/06/23 09:24





  Nystatin Powder 15 Gm  TOP   1 applic





  BID SEMAJ   Administration


 


Polyethylene Glycol  17 gm  06/06/23 12:00  06/06/23 11:59





  Polyethylene Glycol 3350 17 Gm Packet  PO   17 gm





  DAILY SEMAJ   Administration


 


Quetiapine Fumarate  75 mg  06/02/23 20:00  06/05/23 19:49





  Quetiapine 25 Mg Tablet  PO   75 mg





  2000 SEMAJ   Administration


 


Saccharomyces Boulardii  250 mg  06/06/23 09:00  06/06/23 09:24





  Saccharomyces Boulardii 250 Mg Capsule  PO   250 mg





  DAILY SEMAJ   Administration


 


Sodium Chloride  10 ml  06/01/23 01:00  06/06/23 16:08





  Sodium Chloride Flush 0.9% 10 Ml Syringe  IVP   Not Given





  0100,0900,1700 [facility]  


 


Tamsulosin HCl  0.4 mg  06/02/23 21:00  06/06/23 09:24





  Tamsulosin 0.4 Mg Capsule  PO   0.4 mg





  BID SEMAJ   Administration














- Lab Result


Fish Bone Diagrams: 


                                 06/06/23 05:02





                                 06/06/23 05:02





- Additional Planning


My Orders: 





My Active Orders





06/06/23 12:00


polyethylene glycoL 3350 [Miralax]   17 gm PO DAILY 














Objective


Vital Signs: 





                               Vital Signs - 24 hr











  06/06/23 06/06/23 06/06/23





  00:35 08:00 16:05


 


Temperature 36.4 C L 36.2 C L 36.7 C


 


Heart Rate [  79 78





Brachial]   


 


Heart Rate [ 74  





Radial]   


 


Respiratory 12 16 18





Rate   


 


Blood Pressure 139/78 H 153/69 H 139/75 H





[Right Brachial   





artery]   


 


O2 Saturation 97 100 99








                                     Oxygen











O2 Source [With Activity]      Room air


 


O2 Source                      Room air














I&O (Last 24 Hrs): 





                          Intake and Output Totals x24h











 06/04/23 06/05/23 06/06/23





 23:59 23:59 23:59


 


Intake Total 2030 1580 950


 


Output Total 4000 3870 3300


 


Balance -1970 -6550 -0590











General: Alert, Other (Confused)


HEENT: Atraumatic


Neck: Supple


Neuro: Alert, Disoriented


Cardiovascular: Regular rate, Normal S1, Normal S2


Respiratory: Chest non-tender, Breath sounds nml


Abdomen: Normal bowel sounds, Soft





- Results


Results: 





                               Laboratory Results











WBC  8.4 x10^3/uL (4.8-10.8)   06/06/23  05:02    


 


RBC  3.79 10^6/uL (4.70-6.10)  L  06/06/23  05:02    


 


Hgb  12.1 g/dL (14.0-18.0)  L  06/06/23  05:02    


 


Hct  37.3 % (42.0-52.0)  L  06/06/23  05:02    


 


MCV  98.4 fL (80.0-94.0)  H  06/06/23  05:02    


 


MCH  31.9 pg (27.0-31.0)  H  06/06/23  05:02    


 


MCHC  32.4 g/dL (32.0-36.0)   06/06/23  05:02    


 


RDW  13.2 % (12.0-15.0)   06/06/23  05:02    


 


Plt Count  309 10^3/uL (130-450)   06/06/23  05:02    


 


MPV  11.3 fL (7.4-11.4)   06/06/23  05:02    


 


Neut # (Auto)  4.7 10^3/uL (1.5-6.6)   06/06/23  05:02    


 


Lymph # (Auto)  2.0 10^3/uL (1.5-3.5)   06/06/23  05:02    


 


Mono # (Auto)  0.9 10^3/uL (0.0-1.0)   06/06/23  05:02    


 


Eos # (Auto)  0.7 10^3/uL (0.0-0.7)   06/06/23  05:02    


 


Baso # (Auto)  0.1 10^3/uL (0.0-0.1)   06/06/23  05:02    


 


Absolute Nucleated RBC  0.00 x10^3/uL  06/06/23  05:02    


 


Total Counted  100   05/31/23  18:25    


 


Band Neuts % (Manual)  0 % (0-10)  05/31/23  18:25    


 


Abnorm Lymph % (Manual)  0 %  05/31/23  18:25    


 


Nucleated RBC %  0.0 /100WBC  06/06/23  05:02    


 


Neutrophils # (Manual)  11.0 10^3/uL (1.5-6.6)  H  05/31/23  18:25    


 


Lymphocytes # (Manual)  1.4 10^3/uL (1.5-3.5)  L  05/31/23  18:25    


 


Monocytes # (Manual)  1.1 10^3/uL (0.0-1.0)  H  05/31/23  18:25    


 


Eosinophils # (Manual)  0.0 10^3/uL (0-0.7)   05/31/23  18:25    


 


Basophils # (Manual)  0.3 10^3/uL (0-0.1)  H  05/31/23  18:25    


 


Differential Comment  MANUAL DIFFERENTIAL   05/31/23  18:25    


 


Platelet Estimate  NORMAL (130-450,000)  (NORMAL)   05/31/23  18:25    


 


Platelet Morphology  NORMAL APPEARANCE  (NORMAL)   05/31/23  18:25    


 


RBC Morph Micro Appear  1+ MACROCYTOSIS  (NORMAL)   05/31/23  18:25    


 


Sodium  143 mmol/L (135-145)   06/06/23  05:02    


 


Potassium  3.5 mmol/L (3.5-5.0)   06/06/23  05:02    


 


Chloride  107 mmol/L (101-111)   06/06/23  05:02    


 


Carbon Dioxide  29 mmol/L (21-32)   06/06/23  05:02    


 


Anion Gap  7.0  (6-13)   06/06/23  05:02    


 


BUN  12 mg/dL (6-20)   06/06/23  05:02    


 


Creatinine  0.8 mg/dL (0.6-1.2)   06/06/23  05:02    


 


Estimated GFR (MDRD)  93  (>89)   06/06/23  05:02    


 


Glucose  83 mg/dL ()   06/06/23  05:02    


 


Lactic Acid  1.6 mmol/L (0.5-2.2)   05/31/23  18:25    


 


Calcium  8.9 mg/dL (8.5-10.3)   06/06/23  05:02    


 


Magnesium  2.2 mg/dL (1.7-2.8)   06/06/23  05:02    


 


Total Bilirubin  0.5 mg/dL (0.2-1.0)   06/03/23  04:46    


 


AST  14 IU/L (10-42)   06/03/23  04:46    


 


ALT  12 IU/L (10-60)   06/03/23  04:46    


 


Alkaline Phosphatase  42 IU/L ()   06/03/23  04:46    


 


Total Protein  6.2 g/dL (6.7-8.2)  L  06/03/23  04:46    


 


Albumin  2.7 g/dL (3.2-5.5)  L  06/03/23  04:46    


 


Globulin  3.5 g/dL (2.1-4.2)   06/03/23  04:46    


 


Albumin/Globulin Ratio  0.8  (1.0-2.2)  L  06/03/23  04:46    


 


Vitamin B12  219 pg/mL (180-914)   06/05/23  08:45    


 


Procalcitonin  < 0.05 ng/mL (<0.5)   05/31/23  18:25    


 


Urine Color  RED/BLOODY   05/31/23  18:40    


 


Urine Clarity  TURBID  (CLEAR)   05/31/23  18:40    


 


Urine pH  6.5 PH (5.0-7.5)   05/31/23  18:40    


 


Ur Specific Gravity  1.025  (1.002-1.030)   05/31/23  18:40    


 


Urine Protein  >=300 mg/dL (NEGATIVE)  H  05/31/23  18:40    


 


Urine Glucose (UA)  NEGATIVE mg/dL (NEGATIVE)   05/31/23  18:40    


 


Urine Ketones  TRACE mg/dL (NEGATIVE)   05/31/23  18:40    


 


Urine Occult Blood  LARGE  (NEGATIVE)  H  05/31/23  18:40    


 


Urine Nitrite  POSITIVE  (NEGATIVE)  H  05/31/23  18:40    


 


Urine Bilirubin  NEGATIVE  (NEGATIVE)   05/31/23  18:40    


 


Urine Urobilinogen  1 (NORMAL) E.U./dL (NORMAL)   05/31/23  18:40    


 


Ur Leukocyte Esterase  TRACE  (NEGATIVE)  H  05/31/23  18:40    


 


Urine RBC  TNTC /HPF (0-5)  H  05/31/23  18:40    


 


Urine WBC  4-5 /HPF (0-3)   05/31/23  18:40    


 


Ur Squamous Epith Cells  NONE SEEN  (<= Few)   05/31/23  18:40    


 


Amorphous Sediment  Moderate /LPF  05/31/23  18:40    


 


Urine Bacteria  Few /HPF (None Seen)   05/31/23  18:40    


 


Ur Microscopic Review  INDICATED   05/31/23  18:40    


 


Urine Culture Comments  INDICATED   05/31/23  18:40    


 


Nasal Adenovirus (PCR)  NOT DETECTED   05/31/23  20:15    


 


Nasal B. parapertussis DNA (PCR)  NOT DETECTED   05/31/23  20:15    


 


Nasal Coronavir 229E PCR  NOT DETECTED   05/31/23  20:15    


 


Nasal Coronavir HKU1 PCR  NOT DETECTED   05/31/23  20:15    


 


Nasal Coronavir NL63 PCR  NOT DETECTED   05/31/23  20:15    


 


Nasal Coronavir OC43 PCR  NOT DETECTED   05/31/23  20:15    


 


Nasal Enterovir/Rhinovir PCR  NOT DETECTED   05/31/23  20:15    


 


Nasal Influenza B PCR  NOT DETECTED   05/31/23  20:15    


 


Nasal Influenza A PCR  NOT DETECTED   05/31/23  20:15    


 


Nasal Parainfluen 1 PCR  NOT DETECTED   05/31/23  20:15    


 


Nasal Parainfluen 2 PCR  NOT DETECTED   05/31/23  20:15    


 


Nasal Parainfluen 3 PCR  NOT DETECTED   05/31/23  20:15    


 


Nasal Parainfluen 4 PCR  NOT DETECTED   05/31/23  20:15    


 


Nasal RSV (PCR)  NOT DETECTED   05/31/23  20:15    


 


Nasal B.pertussis DNA PCR  NOT DETECTED   05/31/23  20:15    


 


Nasal C.pneumoniae (PCR)  NOT DETECTED   05/31/23  20:15    


 


Pranav Human Metapneumo PCR  NOT DETECTED   05/31/23  20:15    


 


Nasal M.pneumoniae (PCR)  NOT DETECTED   05/31/23  20:15    


 


Nasal SARS-CoV-2 (PCR)  NOT DETECTED   05/31/23  20:15    














Sepsis Event Note (H)





- Evaluation


Current Stage of Sepsis: Sepsis


Possible source of Sepsis: positive: Unknown





- Sepsis Criteria


Sepsis Criteria: Recorded Heart Rate greater than 90 bpm, WBC count greater than

10% bands, WBC count greater than 12,000 or less than 4000

## 2023-06-08 RX ADMIN — SODIUM CHLORIDE, PRESERVATIVE FREE SCH: 5 INJECTION INTRAVENOUS at 15:41

## 2023-06-08 RX ADMIN — NYSTATIN SCH APPLIC: 100000 POWDER TOPICAL at 08:23

## 2023-06-08 RX ADMIN — TAMSULOSIN HYDROCHLORIDE SCH MG: 0.4 CAPSULE ORAL at 20:03

## 2023-06-08 RX ADMIN — APIXABAN SCH MG: 5 TABLET, FILM COATED ORAL at 20:03

## 2023-06-08 RX ADMIN — OXYCODONE PRN MG: 5 TABLET ORAL at 22:40

## 2023-06-08 RX ADMIN — Medication SCH MG: at 08:23

## 2023-06-08 RX ADMIN — CYANOCOBALAMIN TAB 500 MCG SCH MCG: 500 TAB at 08:23

## 2023-06-08 RX ADMIN — FERRIC OXIDE RED PRN APPLIC: 8; 8 LOTION TOPICAL at 20:04

## 2023-06-08 RX ADMIN — Medication SCH TAB: at 12:20

## 2023-06-08 RX ADMIN — FINASTERIDE SCH MG: 5 TABLET, FILM COATED ORAL at 08:23

## 2023-06-08 RX ADMIN — APIXABAN SCH MG: 5 TABLET, FILM COATED ORAL at 08:23

## 2023-06-08 RX ADMIN — TAMSULOSIN HYDROCHLORIDE SCH MG: 0.4 CAPSULE ORAL at 08:23

## 2023-06-08 RX ADMIN — NYSTATIN SCH APPLIC: 100000 POWDER TOPICAL at 20:03

## 2023-06-08 RX ADMIN — LIDOCAINE SCH PATCH: 50 PATCH TOPICAL at 08:23

## 2023-06-08 RX ADMIN — SODIUM CHLORIDE, PRESERVATIVE FREE SCH: 5 INJECTION INTRAVENOUS at 08:24

## 2023-06-08 NOTE — PROVIDER PROGRESS NOTE
Assessment/Plan





- Problem List


(1) UTI (urinary tract infection)


Qualifiers: 


   Urinary tract infection type: acute cystitis   Hematuria presence: without 

hematuria   Qualified Code(s): N30.00 - Acute cystitis without hematuria   


Assessment/Plan: 


Assessment/Plan: 


This patient's recent history and antibiotic use is complex however: He had a 

UTI that was being treated with Cipro, and on the urine cx from 5/19/23, he grew

E coli and Proteus mirabilis. I think he had not completed the Cipro course of 

treatment. Then he came to the ER on 5/29 due to hematuria, and blood and ur cx 

were drawn, and the ED provider sent him home on Cefpodoxime. Then that 5/29 bld

cx turned pos for Staph epi, felt to be a contaminant, but he was called on 5/31

and his wife reported he was worse. He came in now on 5/31 and had a fever on 

6/1. From this latest ER presentation, his urine cx has no growth and bld cx 

have no growth.


The admitting telemedicine doctor felt he had sepsis caused by a UTI.  He 

received Cefepime in the ED and has been continued on empiric iv Ceftriaxone 

daily.


I reviewed this entire sequence of events, results and antibx used with our 

pharmacist.  He probably has a semitreated UTI.  I reviewed all labs. Since adriel

ng this admission his white bld count has gone from 11.2 >> 13.7 >> 9.8 >> 11.4 

>> 10.7 yesterday>> 10.1 today, I kept him on iv Ceftriaxone, which the E coli 

and Proteus are sens to. 


IVF were stopped.


Plan:


Will change ceftriaxone IV daily to oral Levaquin, based on the sensitivity 

profiles of the E coli and Proteus. This choice was reviewed with jan Estevez today. I will plan a 21-day total course of antibiotics for good prostate

penetration. He has gotten 6 days of iv antibx, so 15 more days using Levaquin 

once daily, plus a probiotic. Today is Day 1 of 15 days.


I want to assess him after at least 24 hours of being on an oral antibiotic to 

assure that there is no return of fever or rising WBC.


I reviewed all the above with the daughter and daughter-in-law at bedside today





(2) Acute urinary retention


His home prostate meds Tamsolusin and Finasteride are being given here.


This patient has had several episodes of urinary retention greater than 1 L. He 

had a Pineda in which was discontinued 2 days ago after bladder training.  

However, again last night Telemedicine doctor was called because the patient had

2 L found on bladder scanning and Pineda catheter insertion was ordered. 


Because of his dementia he has tried to pull out his Pineda, when he last had it,

therefore soft restraints of upper extremities were needed


Plan:


Unfortunately we again have to order upper extremity restraints so he does not 

pull out his Pineda. 


The other alternative is to not use a chronic Pineda, but to schedule straight 

cathing for him possibly twice daily or at least daily, [facility]. SW checked if that center would do straight caths and they cannot. 

I discussed this with the wife at bedside, and daughter-in-law present.





June 6, 2023-we will straight cath every 6 hours due to significant urinary 

retention this will be as needed postvoid residual greater than 300.  Will need 

SNF placement





June 8, 2023 awaiting SNF placement





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


On 6/4 the wife told me that the patient was able to walk without walker until 

about 2 weeks ago.  Then he got weak and has not been able to stand. The patient

now needs two person assist to stand or move, he thinks he is falling, he did 

not know what to do with his utensils to feed himself


I resumed his home med Quietipine


PT and OT evaluated him but he is not a candidate for rehab at a SNF since he is

not directable due to his dementia. I updated the wife about this today, and 

said that he now may be bedbound or need to be put in a lift to be transferred 

from bed to a chair. She understood.


The wife confirmed that she [facility]. 


SW checked if that center would do straight caths and they cannot. I discussed 

this with the wife at bedside, and daughter-in-law present.





June 8, 2023 Will increase Seroquel to twice daily to better control his mood





(4) HTN


This is a new diagnosis for him.  His BP is as high as 180s over 100s. His nurse

suspected that it was because he has poor pain control.  He cannot tell us where

his pain is because of his dementia


As needed IV Hydralazine was ordered by the telemedicine doctor


I addressed his pain control and started him on Amlodipine 5 mg daily 


Plan:


Continue Amlodipine daily





(5) Low back pain


I spoke to his wife on 6/4 , asking where the source of his pain usually is and 

she said she thinks it's his lower back. I informed her that using narcotics for

pain control will definitely add to his confusion and somnolence. I ordered a 

Lidocaine patch scheduled daily, not prn


Plan:


Continue pain control with a scheduled Lidocaine patch


I have decreased his oxycodone from 5 mg every 6 hours prn to 2.5 mg every 8 

hours prn.





(6) Lethargy


Only since he has gotten morphine or oxycodone has he been hypersomnolent.


Plan:


I will decrease his narcotic pain med dose oxycodone from 5 mg every 6 hours prn

to 2.5 mg every 8 hours prn.





(7) Chronic A-fib


Plan:


The patient was on Pradaxa, and a DOAC has been continued here.


He has not been ordered to have telemetry (since I expected him to try to pull 

the patches off too), but his vital signs show stable heart rate





(8) Sepsis


RESOLVED


Patient had criteria for sepsis: White blood count 13.7, fever of 38.0 at 

midnight, tachycardic with heart rate of 100 at rest. IVF were stopped.


Plan:


Continue with antibx

















- Current Meds


Current Meds: 





                               Current Medications











Generic Name Dose Route Start Last Admin





  Trade Name Ancelmoq  PRN Reason Stop Dose Admin


 


Acetaminophen  650 mg  05/31/23 19:45  06/07/23 19:19





  Acetaminophen 325 Mg Tablet  PO   650 mg





  Q4HR PRN   Administration





  Pain 1 to 4, or Fever  


 


Amlodipine Besylate  5 mg  06/04/23 09:00  06/08/23 08:23





  Amlodipine 5 Mg Tablet  PO   5 mg





  DAILY SEMAJ   Administration


 


Apixaban  5 mg  06/01/23 09:00  06/08/23 08:23





  Apixaban 5 Mg Tablet  PO   5 mg





  BID SEMAJ   Administration


 


Calamine  1 applic  06/05/23 17:18  06/07/23 17:36





  Calamine/Zinc Oxide 177 Ml Bottle  TOP   1 applic





  PRN PRN   Administration





  SKIN CARE  


 


Cyanocobalamin  500 mcg  06/07/23 10:00  06/08/23 08:23





  Cyanocobalamin 500 Mcg Tablet  PO   500 mcg





  DAILY SEMAJ   Administration


 


Finasteride  5 mg  06/01/23 09:00  06/08/23 08:23





  Finasteride 5 Mg Tablet  PO   5 mg





  DAILY SEMAJ   Administration


 


Levofloxacin  500 mg  06/06/23 09:00  06/08/23 08:23





  Levofloxacin 250 Mg Tablet  PO   500 mg





  DAILY SEMAJ   Administration


 


Lidocaine  1 patch  06/05/23 09:00  06/08/23 08:23





  Lidocaine Patch 5%  TOP   1 patch





  DAILY SEMAJ   Administration


 


Multi-Ingredient Ointment  1 applic  06/01/23 21:11  06/02/23 08:35





  Zinc Oxide 20% Oint 30 Gm Tube  TOP   1 applic





  PRN PRN   Administration





  Skin Care  


 


Multivitamins/Minerals  1 tab  06/05/23 17:00  06/08/23 12:20





  Multivitamin W/Minerals Tablet  PO   1 tab





  QDLUNCH SEMAJ   Administration


 


Nystatin  1 applic  06/02/23 21:00  06/08/23 08:23





  Nystatin Powder 15 Gm  TOP   1 applic





  BID SEMAJ   Administration


 


Oxycodone HCl  2.5 mg  06/05/23 08:40  06/07/23 22:16





  Oxycodone 5 Mg Tablet  PO   2.5 mg





  Q8H PRN   Administration





  PAIN 5-10  


 


Polyethylene Glycol  17 gm  06/06/23 12:00  06/08/23 08:24





  Polyethylene Glycol 3350 17 Gm Packet  PO   17 gm





  DAILY SEMAJ   Administration


 


Quetiapine Fumarate  75 mg  06/08/23 12:00  06/08/23 12:20





  Quetiapine 25 Mg Tablet  PO   75 mg





  BID SEMAJ   Administration


 


Saccharomyces Boulardii  250 mg  06/06/23 09:00  06/08/23 08:23





  Saccharomyces Boulardii 250 Mg Capsule  PO   250 mg





  DAILY SEMAJ   Administration


 


Sodium Chloride  10 ml  06/01/23 01:00  06/08/23 08:24





  Sodium Chloride Flush 0.9% 10 Ml Syringe  IVP   Not Given





  0100,0900,1700 [facility]  


 


Tamsulosin HCl  0.4 mg  06/02/23 21:00  06/08/23 08:23





  Tamsulosin 0.4 Mg Capsule  PO   0.4 mg





  BID SEMAJ   Administration














- Lab Result


Fish Bone Diagrams: 


                                 06/06/23 05:02





                                 06/06/23 05:02





- Additional Planning


My Orders: 





My Active Orders





06/08/23 12:00


QUEtiapine [SEROquel]   75 mg PO BID 














Subjective





- Subjective


Patient Reports: Feeling Better





Objective


Vital Signs: 





                               Vital Signs - 24 hr











  06/07/23 06/07/23 06/08/23





  16:00 23:55 08:00


 


Temperature 36.9 C 36.3 C L 36.6 C


 


Heart Rate [ 68 68 73





Brachial]   


 


Respiratory 16 16 18





Rate   


 


Blood Pressure 126/89 H 147/87 H 116/85 H





[Right Brachial   





artery]   


 


O2 Saturation 99 99 96








                                     Oxygen











O2 Source [With Activity]      Room air


 


O2 Source                      Room air














I&O (Last 24 Hrs): 





                          Intake and Output Totals x24h











 06/06/23 06/07/23 06/08/23





 23:59 23:59 23:59


 


Intake Total 1637 600 700


 


Output Total 4000 3602 1175


 


Balance -4873 -3002 -918











General: Alert


HEENT: Atraumatic


Neuro: Alert, Other (Has dementia)


Cardiovascular: Regular rate, Normal S1, Normal S2


Respiratory: No respiratory distress


Abdomen: Normal bowel sounds, Soft


Skin: No rashes





- Results


Results: 





                               Laboratory Results











WBC  8.4 x10^3/uL (4.8-10.8)   06/06/23  05:02    


 


RBC  3.79 10^6/uL (4.70-6.10)  L  06/06/23  05:02    


 


Hgb  12.1 g/dL (14.0-18.0)  L  06/06/23  05:02    


 


Hct  37.3 % (42.0-52.0)  L  06/06/23  05:02    


 


MCV  98.4 fL (80.0-94.0)  H  06/06/23  05:02    


 


MCH  31.9 pg (27.0-31.0)  H  06/06/23  05:02    


 


MCHC  32.4 g/dL (32.0-36.0)   06/06/23  05:02    


 


RDW  13.2 % (12.0-15.0)   06/06/23  05:02    


 


Plt Count  309 10^3/uL (130-450)   06/06/23  05:02    


 


MPV  11.3 fL (7.4-11.4)   06/06/23  05:02    


 


Neut # (Auto)  4.7 10^3/uL (1.5-6.6)   06/06/23  05:02    


 


Lymph # (Auto)  2.0 10^3/uL (1.5-3.5)   06/06/23  05:02    


 


Mono # (Auto)  0.9 10^3/uL (0.0-1.0)   06/06/23  05:02    


 


Eos # (Auto)  0.7 10^3/uL (0.0-0.7)   06/06/23  05:02    


 


Baso # (Auto)  0.1 10^3/uL (0.0-0.1)   06/06/23  05:02    


 


Absolute Nucleated RBC  0.00 x10^3/uL  06/06/23  05:02    


 


Total Counted  100   05/31/23  18:25    


 


Band Neuts % (Manual)  0 % (0-10)  05/31/23  18:25    


 


Abnorm Lymph % (Manual)  0 %  05/31/23  18:25    


 


Nucleated RBC %  0.0 /100WBC  06/06/23  05:02    


 


Neutrophils # (Manual)  11.0 10^3/uL (1.5-6.6)  H  05/31/23  18:25    


 


Lymphocytes # (Manual)  1.4 10^3/uL (1.5-3.5)  L  05/31/23  18:25    


 


Monocytes # (Manual)  1.1 10^3/uL (0.0-1.0)  H  05/31/23  18:25    


 


Eosinophils # (Manual)  0.0 10^3/uL (0-0.7)   05/31/23  18:25    


 


Basophils # (Manual)  0.3 10^3/uL (0-0.1)  H  05/31/23  18:25    


 


Differential Comment  MANUAL DIFFERENTIAL   05/31/23  18:25    


 


Platelet Estimate  NORMAL (130-450,000)  (NORMAL)   05/31/23  18:25    


 


Platelet Morphology  NORMAL APPEARANCE  (NORMAL)   05/31/23  18:25    


 


RBC Morph Micro Appear  1+ MACROCYTOSIS  (NORMAL)   05/31/23  18:25    


 


Sodium  143 mmol/L (135-145)   06/06/23  05:02    


 


Potassium  3.5 mmol/L (3.5-5.0)   06/06/23  05:02    


 


Chloride  107 mmol/L (101-111)   06/06/23  05:02    


 


Carbon Dioxide  29 mmol/L (21-32)   06/06/23  05:02    


 


Anion Gap  7.0  (6-13)   06/06/23  05:02    


 


BUN  12 mg/dL (6-20)   06/06/23  05:02    


 


Creatinine  0.8 mg/dL (0.6-1.2)   06/06/23  05:02    


 


Estimated GFR (MDRD)  93  (>89)   06/06/23  05:02    


 


Glucose  83 mg/dL ()   06/06/23  05:02    


 


Lactic Acid  1.6 mmol/L (0.5-2.2)   05/31/23  18:25    


 


Calcium  8.9 mg/dL (8.5-10.3)   06/06/23  05:02    


 


Magnesium  2.2 mg/dL (1.7-2.8)   06/06/23  05:02    


 


Total Bilirubin  0.5 mg/dL (0.2-1.0)   06/03/23  04:46    


 


AST  14 IU/L (10-42)   06/03/23  04:46    


 


ALT  12 IU/L (10-60)   06/03/23  04:46    


 


Alkaline Phosphatase  42 IU/L ()   06/03/23  04:46    


 


Total Protein  6.2 g/dL (6.7-8.2)  L  06/03/23  04:46    


 


Albumin  2.7 g/dL (3.2-5.5)  L  06/03/23  04:46    


 


Globulin  3.5 g/dL (2.1-4.2)   06/03/23  04:46    


 


Albumin/Globulin Ratio  0.8  (1.0-2.2)  L  06/03/23  04:46    


 


Vitamin B12  219 pg/mL (180-914)   06/05/23  08:45    


 


Procalcitonin  < 0.05 ng/mL (<0.5)   05/31/23  18:25    


 


Urine Color  RED/BLOODY   05/31/23  18:40    


 


Urine Clarity  TURBID  (CLEAR)   05/31/23  18:40    


 


Urine pH  6.5 PH (5.0-7.5)   05/31/23  18:40    


 


Ur Specific Gravity  1.025  (1.002-1.030)   05/31/23  18:40    


 


Urine Protein  >=300 mg/dL (NEGATIVE)  H  05/31/23  18:40    


 


Urine Glucose (UA)  NEGATIVE mg/dL (NEGATIVE)   05/31/23  18:40    


 


Urine Ketones  TRACE mg/dL (NEGATIVE)   05/31/23  18:40    


 


Urine Occult Blood  LARGE  (NEGATIVE)  H  05/31/23  18:40    


 


Urine Nitrite  POSITIVE  (NEGATIVE)  H  05/31/23  18:40    


 


Urine Bilirubin  NEGATIVE  (NEGATIVE)   05/31/23  18:40    


 


Urine Urobilinogen  1 (NORMAL) E.U./dL (NORMAL)   05/31/23  18:40    


 


Ur Leukocyte Esterase  TRACE  (NEGATIVE)  H  05/31/23  18:40    


 


Urine RBC  TNTC /HPF (0-5)  H  05/31/23  18:40    


 


Urine WBC  4-5 /HPF (0-3)   05/31/23  18:40    


 


Ur Squamous Epith Cells  NONE SEEN  (<= Few)   05/31/23  18:40    


 


Amorphous Sediment  Moderate /LPF  05/31/23  18:40    


 


Urine Bacteria  Few /HPF (None Seen)   05/31/23  18:40    


 


Ur Microscopic Review  INDICATED   05/31/23  18:40    


 


Urine Culture Comments  INDICATED   05/31/23  18:40    


 


Nasal Adenovirus (PCR)  NOT DETECTED   05/31/23  20:15    


 


Nasal B. parapertussis DNA (PCR)  NOT DETECTED   05/31/23  20:15    


 


Nasal Coronavir 229E PCR  NOT DETECTED   05/31/23  20:15    


 


Nasal Coronavir HKU1 PCR  NOT DETECTED   05/31/23  20:15    


 


Nasal Coronavir NL63 PCR  NOT DETECTED   05/31/23  20:15    


 


Nasal Coronavir OC43 PCR  NOT DETECTED   05/31/23  20:15    


 


Nasal Enterovir/Rhinovir PCR  NOT DETECTED   05/31/23  20:15    


 


Nasal Influenza B PCR  NOT DETECTED   05/31/23  20:15    


 


Nasal Influenza A PCR  NOT DETECTED   05/31/23  20:15    


 


Nasal Parainfluen 1 PCR  NOT DETECTED   05/31/23  20:15    


 


Nasal Parainfluen 2 PCR  NOT DETECTED   05/31/23  20:15    


 


Nasal Parainfluen 3 PCR  NOT DETECTED   05/31/23  20:15    


 


Nasal Parainfluen 4 PCR  NOT DETECTED   05/31/23  20:15    


 


Nasal RSV (PCR)  NOT DETECTED   05/31/23  20:15    


 


Nasal B.pertussis DNA PCR  NOT DETECTED   05/31/23  20:15    


 


Nasal C.pneumoniae (PCR)  NOT DETECTED   05/31/23  20:15    


 


Pranav Human Metapneumo PCR  NOT DETECTED   05/31/23  20:15    


 


Nasal M.pneumoniae (PCR)  NOT DETECTED   05/31/23  20:15    


 


Nasal SARS-CoV-2 (PCR)  NOT DETECTED   05/31/23  20:15    














Sepsis Event Note (H)





- Evaluation


Current Stage of Sepsis: Sepsis


Possible source of Sepsis: positive: Unknown





- Sepsis Criteria


Sepsis Criteria: Recorded Heart Rate greater than 90 bpm, WBC count greater than

10% bands, WBC count greater than 12,000 or less than 4000





ABX Reporting


Has patient been on IV antibiotics over the past 48 hours?: No

## 2023-06-09 LAB
ALBUMIN DIAFP-MCNC: 3 G/DL (ref 3.2–5.5)
ALBUMIN/GLOB SERPL: 1 {RATIO} (ref 1–2.2)
ALP SERPL-CCNC: 49 IU/L (ref 42–121)
ALT SERPL W P-5'-P-CCNC: 22 IU/L (ref 10–60)
ANION GAP SERPL CALCULATED.4IONS-SCNC: 7 MMOL/L (ref 6–13)
AST SERPL W P-5'-P-CCNC: 25 IU/L (ref 10–42)
BASOPHILS NFR BLD AUTO: 0.1 10^3/UL (ref 0–0.1)
BASOPHILS NFR BLD AUTO: 0.9 %
BILIRUB BLD-MCNC: 0.5 MG/DL (ref 0.2–1)
BUN SERPL-MCNC: 11 MG/DL (ref 6–20)
CALCIUM UR-MCNC: 8.7 MG/DL (ref 8.5–10.3)
CHLORIDE SERPL-SCNC: 108 MMOL/L (ref 101–111)
CO2 SERPL-SCNC: 27 MMOL/L (ref 21–32)
CREAT SERPLBLD-SCNC: 0.8 MG/DL (ref 0.6–1.2)
EOSINOPHIL # BLD AUTO: 0.5 10^3/UL (ref 0–0.7)
EOSINOPHIL NFR BLD AUTO: 6.1 %
ERYTHROCYTE [DISTWIDTH] IN BLOOD BY AUTOMATED COUNT: 13.2 % (ref 12–15)
GFRSERPLBLD MDRD-ARVRAT: 93 ML/MIN/{1.73_M2} (ref 89–?)
GLOBULIN SER-MCNC: 3.1 G/DL (ref 2.1–4.2)
GLUCOSE SERPL-MCNC: 95 MG/DL (ref 70–100)
HCT VFR BLD AUTO: 38.3 % (ref 42–52)
HGB UR QL STRIP: 12.8 G/DL (ref 14–18)
LYMPHOCYTES # SPEC AUTO: 1.8 10^3/UL (ref 1.5–3.5)
LYMPHOCYTES NFR BLD AUTO: 20.3 %
MCH RBC QN AUTO: 32.4 PG (ref 27–31)
MCHC RBC AUTO-ENTMCNC: 33.4 G/DL (ref 32–36)
MCV RBC AUTO: 97 FL (ref 80–94)
MONOCYTES # BLD AUTO: 1 10^3/UL (ref 0–1)
MONOCYTES NFR BLD AUTO: 11.4 %
NEUTROPHILS # BLD AUTO: 5.3 10^3/UL (ref 1.5–6.6)
NEUTROPHILS # SNV AUTO: 8.7 X10^3/UL (ref 4.8–10.8)
NEUTROPHILS NFR BLD AUTO: 61.1 %
NRBC # BLD AUTO: 0 /100WBC
NRBC # BLD AUTO: 0 X10^3/UL
PDW BLD AUTO: 9.9 FL (ref 7.4–11.4)
PLATELET # BLD: 376 10^3/UL (ref 130–450)
POTASSIUM SERPL-SCNC: 3.6 MMOL/L (ref 3.5–5)
PROT SPEC-MCNC: 6.1 G/DL (ref 6.7–8.2)
RBC MAR: 3.95 10^6/UL (ref 4.7–6.1)
SODIUM SERPLBLD-SCNC: 142 MMOL/L (ref 135–145)

## 2023-06-09 RX ADMIN — TAMSULOSIN HYDROCHLORIDE SCH MG: 0.4 CAPSULE ORAL at 20:57

## 2023-06-09 RX ADMIN — SODIUM CHLORIDE, PRESERVATIVE FREE SCH: 5 INJECTION INTRAVENOUS at 02:14

## 2023-06-09 RX ADMIN — NYSTATIN SCH APPLIC: 100000 POWDER TOPICAL at 11:13

## 2023-06-09 RX ADMIN — ACETAMINOPHEN PRN MG: 325 TABLET ORAL at 23:42

## 2023-06-09 RX ADMIN — CYANOCOBALAMIN TAB 500 MCG SCH MCG: 500 TAB at 12:49

## 2023-06-09 RX ADMIN — LIDOCAINE SCH PATCH: 50 PATCH TOPICAL at 11:06

## 2023-06-09 RX ADMIN — APIXABAN SCH MG: 5 TABLET, FILM COATED ORAL at 20:57

## 2023-06-09 RX ADMIN — SODIUM CHLORIDE, PRESERVATIVE FREE SCH: 5 INJECTION INTRAVENOUS at 12:53

## 2023-06-09 RX ADMIN — Medication SCH TAB: at 12:49

## 2023-06-09 RX ADMIN — APIXABAN SCH MG: 5 TABLET, FILM COATED ORAL at 12:48

## 2023-06-09 RX ADMIN — Medication SCH MG: at 12:53

## 2023-06-09 RX ADMIN — FINASTERIDE SCH MG: 5 TABLET, FILM COATED ORAL at 12:49

## 2023-06-09 RX ADMIN — SODIUM CHLORIDE, PRESERVATIVE FREE SCH: 5 INJECTION INTRAVENOUS at 20:57

## 2023-06-09 RX ADMIN — TAMSULOSIN HYDROCHLORIDE SCH MG: 0.4 CAPSULE ORAL at 12:48

## 2023-06-09 RX ADMIN — NYSTATIN SCH APPLIC: 100000 POWDER TOPICAL at 20:57

## 2023-06-09 NOTE — PROVIDER PROGRESS NOTE
Assessment/Plan





- Problem List


(1) UTI (urinary tract infection)


Qualifiers: 


   Urinary tract infection type: acute cystitis   Hematuria presence: without 

hematuria   Qualified Code(s): N30.00 - Acute cystitis without hematuria   


Assessment/Plan: 


  Urinary tract infection type: acute cystitis   Hematuria presence: without 

hematuria   Qualified Code(s): N30.00 - Acute cystitis without hematuria   


Assessment/Plan: 


Assessment/Plan: 


This patient's recent history and antibiotic use is complex however: He had a 

UTI that was being treated with Cipro, and on the urine cx from 5/19/23, he grew

E coli and Proteus mirabilis. I think he had not completed the Cipro course of 

treatment. Then he came to the ER on 5/29 due to hematuria, and blood and ur cx 

were drawn, and the ED provider sent him home on Cefpodoxime. Then that 5/29 bld

cx turned pos for Staph epi, felt to be a contaminant, but he was called on 5/31

and his wife reported he was worse. He came in now on 5/31 and had a fever on 

6/1. From this latest ER presentation, his urine cx has no growth and bld cx 

have no growth.


The admitting telemedicine doctor felt he had sepsis caused by a UTI.  He 

received Cefepime in the ED and has been continued on empiric iv Ceftriaxone 

daily.


I reviewed this entire sequence of events, results and antibx used with our 

pharmacist.  He probably has a semitreated UTI.  I reviewed all labs. Since 

during this admission his white bld count has gone from 11.2 >> 13.7 >> 9.8 >> 

11.4 >> 10.7 yesterday>> 10.1 today, I kept him on iv Ceftriaxone, which the E 

coli and Proteus are sens to. 


IVF were stopped.


Plan:


Will change ceftriaxone IV daily to oral Levaquin, based on the sensitivity 

profiles of the E coli and Proteus. This choice was reviewed with pharmacist 

Herb today. I will plan a 21-day total course of antibiotics for good prostate

penetration. He has gotten 6 days of iv antibx, so 15 more days using Levaquin 

once daily, plus a probiotic. Today is Day 1 of 15 days.


I want to assess him after at least 24 hours of being on an oral antibiotic to 

assure that there is no return of fever or rising WBC.


I reviewed all the above with the daughter and daughter-in-law at bedside today





(2) Acute urinary retention


His home prostate meds Tamsolusin and Finasteride are being given here.


This patient has had several episodes of urinary retention greater than 1 L. He 

had a Pineda in which was discontinued 2 days ago after bladder training.  

However, again last night Telemedicine doctor was called because the patient had

2 L found on bladder scanning and Pineda catheter insertion was ordered. 


Because of his dementia he has tried to pull out his Pineda, when he last had it,

therefore soft restraints of upper extremities were needed


Plan:


Unfortunately we again have to order upper extremity restraints so he does not 

pull out his Pineda. 


The other alternative is to not use a chronic Pineda, but to schedule straight 

cathing for him possibly twice daily or at least daily, [facility]. SW checked if that center would do straight caths and they cannot. 

I discussed this with the wife at bedside, and daughter-in-law present.





June 6, 2023-we will straight cath every 6 hours due to significant urinary 

retention this will be as needed postvoid residual greater than 300.  Will need 

SNF placement





June 8, 2023 awaiting SNF placement





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


On 6/4 the wife told me that the patient was able to walk without walker until 

about 2 weeks ago.  Then he got weak and has not been able to stand. The patient

now needs two person assist to stand or move, he thinks he is falling, he did 

not know what to do with his utensils to feed himself


I resumed his home med Quietipine


PT and OT evaluated him but he is not a candidate for rehab at a SNF since he is

not directable due to his dementia. I updated the wife about this today, and 

said that he now may be bedbound or need to be put in a lift to be transferred 

from bed to a chair. She understood.


The wife confirmed that she wants [facility]. 


SW checked if that center would do straight caths and they cannot. I discussed 

this with the wife at bedside, and daughter-in-law present.





June 8, 2023 Will increase Seroquel to twice daily to better control his mood





(4) HTN


This is a new diagnosis for him.  His BP is as high as 180s over 100s. His nurse

suspected that it was because he has poor pain control.  He cannot tell us where

his pain is because of his dementia


As needed IV Hydralazine was ordered by the telemedicine doctor


I addressed his pain control and started him on Amlodipine 5 mg daily 


Plan:


Continue Amlodipine daily





(5) Low back pain


I spoke to his wife on 6/4 , asking where the source of his pain usually is and 

she said she thinks it's his lower back. I informed her that using narcotics for

pain control will definitely add to his confusion and somnolence. I ordered a 

Lidocaine patch scheduled daily, not prn


Plan:


Continue pain control with a scheduled Lidocaine patch


I have decreased his oxycodone from 5 mg every 6 hours prn to 2.5 mg every 8 

hours prn.





(6) Lethargy


Only since he has gotten morphine or oxycodone has he been hypersomnolent.


Plan:


I will decrease his narcotic pain med dose oxycodone from 5 mg every 6 hours prn

to 2.5 mg every 8 hours prn.





(7) Chronic A-fib


Plan:


The patient was on Pradaxa, and a DOAC has been continued here.


He has not been ordered to have telemetry (since I expected him to try to pull 

the patches off too), but his vital signs show stable heart rate





(8) Sepsis


RESOLVED


Patient had criteria for sepsis: White blood count 13.7, fever of 38.0 at midnig

ht, tachycardic with heart rate of 100 at rest. IVF were stopped.


Plan:


Continue with antibx




















- Current Meds


Current Meds: 





                               Current Medications











Generic Name Dose Route Start Last Admin





  Trade Name Ancelmoq  PRN Reason Stop Dose Admin


 


Acetaminophen  650 mg  05/31/23 19:45  06/07/23 19:19





  Acetaminophen 325 Mg Tablet  PO   650 mg





  Q4HR PRN   Administration





  Pain 1 to 4, or Fever  


 


Amlodipine Besylate  5 mg  06/04/23 09:00  06/09/23 12:48





  Amlodipine 5 Mg Tablet  PO   5 mg





  DAILY SEMAJ   Administration


 


Apixaban  5 mg  06/01/23 09:00  06/09/23 12:48





  Apixaban 5 Mg Tablet  PO   5 mg





  BID SEMAJ   Administration


 


Calamine  1 applic  06/05/23 17:18  06/08/23 20:04





  Calamine/Zinc Oxide 177 Ml Bottle  TOP   1 applic





  PRN PRN   Administration





  SKIN CARE  


 


Cyanocobalamin  500 mcg  06/07/23 10:00  06/09/23 12:49





  Cyanocobalamin 500 Mcg Tablet  PO   500 mcg





  DAILY SEMAJ   Administration


 


Finasteride  5 mg  06/01/23 09:00  06/09/23 12:49





  Finasteride 5 Mg Tablet  PO   5 mg





  DAILY SEMAJ   Administration


 


Levofloxacin  500 mg  06/06/23 09:00  06/09/23 12:48





  Levofloxacin 250 Mg Tablet  PO   500 mg





  DAILY SEMAJ   Administration


 


Lidocaine  1 patch  06/05/23 09:00  06/09/23 11:06





  Lidocaine Patch 5%  TOP   1 patch





  DAILY SEMAJ   Administration


 


Multi-Ingredient Ointment  1 applic  06/01/23 21:11  06/02/23 08:35





  Zinc Oxide 20% Oint 30 Gm Tube  TOP   1 applic





  PRN PRN   Administration





  Skin Care  


 


Multivitamins/Minerals  1 tab  06/05/23 17:00  06/09/23 12:49





  Multivitamin W/Minerals Tablet  PO   1 tab





  QDLUNCH SEMAJ   Administration


 


Nystatin  1 applic  06/02/23 21:00  06/09/23 11:13





  Nystatin Powder 15 Gm  TOP   1 applic





  BID SEMAJ   Administration


 


Oxycodone HCl  2.5 mg  06/05/23 08:40  06/08/23 22:40





  Oxycodone 5 Mg Tablet  PO   2.5 mg





  Q8H PRN   Administration





  PAIN 5-10  


 


Polyethylene Glycol  17 gm  06/06/23 12:00  06/09/23 12:47





  Polyethylene Glycol 3350 17 Gm Packet  PO   17 gm





  DAILY SEMAJ   Administration


 


Quetiapine Fumarate  75 mg  06/08/23 12:00  06/09/23 12:50





  Quetiapine 25 Mg Tablet  PO   75 mg





  BID SEMAJ   Administration


 


Saccharomyces Boulardii  250 mg  06/06/23 09:00  06/09/23 12:53





  Saccharomyces Boulardii 250 Mg Capsule  PO   250 mg





  DAILY SEMAJ   Administration


 


Sodium Chloride  10 ml  06/01/23 01:00  06/09/23 12:53





  Sodium Chloride Flush 0.9% 10 Ml Syringe  IVP   Not Given





  0100,0900,1700 SEMAJ  


 


Tamsulosin HCl  0.4 mg  06/02/23 21:00  06/09/23 12:48





  Tamsulosin 0.4 Mg Capsule  PO   0.4 mg





  BID SEMAJ   Administration














- Lab Result


Fish Bone Diagrams: 


                                 06/09/23 07:56





                                 06/09/23 07:56





Subjective





- Subjective


Patient Reports: Resting Comfortably (Wife at bedside)





Objective


Vital Signs: 





                               Vital Signs - 24 hr











  06/08/23 06/09/23 06/09/23





  15:45 00:40 08:00


 


Temperature 36.7 C 36.8 C 36.3 C L


 


Heart Rate [ 63 69 73





Brachial]   


 


Respiratory 16 18 18





Rate   


 


Blood Pressure 125/74 142/89 H 106/74





[Right Brachial   





artery]   


 


O2 Saturation 96 98 100














  06/09/23





  12:43


 


Temperature 36.3 C L


 


Heart Rate [ 77





Brachial] 


 


Respiratory 18





Rate 


 


Blood Pressure 114/62





[Right Brachial 





artery] 


 


O2 Saturation 96








                                     Oxygen











O2 Source [With Activity]      Room air


 


O2 Source                      Room air














I&O (Last 24 Hrs): 





                          Intake and Output Totals x24h











 06/07/23 06/08/23 06/09/23





 23:59 23:59 23:59


 


Intake Total 600 1440 440


 


Output Total 3602 1625 4797


 


Balance -1197 -609 -1972











General: Cooperative, No acute distress


HEENT: Atraumatic


Neck: Supple


Respiratory: No respiratory distress





- Results


Results: 





                               Laboratory Results











WBC  8.7 x10^3/uL (4.8-10.8)   06/09/23  07:56    


 


RBC  3.95 10^6/uL (4.70-6.10)  L  06/09/23  07:56    


 


Hgb  12.8 g/dL (14.0-18.0)  L  06/09/23  07:56    


 


Hct  38.3 % (42.0-52.0)  L  06/09/23  07:56    


 


MCV  97.0 fL (80.0-94.0)  H  06/09/23  07:56    


 


MCH  32.4 pg (27.0-31.0)  H  06/09/23  07:56    


 


MCHC  33.4 g/dL (32.0-36.0)   06/09/23  07:56    


 


RDW  13.2 % (12.0-15.0)   06/09/23  07:56    


 


Plt Count  376 10^3/uL (130-450)   06/09/23  07:56    


 


MPV  9.9 fL (7.4-11.4)   06/09/23  07:56    


 


Neut # (Auto)  5.3 10^3/uL (1.5-6.6)   06/09/23  07:56    


 


Lymph # (Auto)  1.8 10^3/uL (1.5-3.5)   06/09/23  07:56    


 


Mono # (Auto)  1.0 10^3/uL (0.0-1.0)   06/09/23  07:56    


 


Eos # (Auto)  0.5 10^3/uL (0.0-0.7)   06/09/23  07:56    


 


Baso # (Auto)  0.1 10^3/uL (0.0-0.1)   06/09/23  07:56    


 


Absolute Nucleated RBC  0.00 x10^3/uL  06/09/23  07:56    


 


Total Counted  100   05/31/23  18:25    


 


Band Neuts % (Manual)  0 % (0-10)  05/31/23  18:25    


 


Abnorm Lymph % (Manual)  0 %  05/31/23  18:25    


 


Nucleated RBC %  0.0 /100WBC  06/09/23  07:56    


 


Neutrophils # (Manual)  11.0 10^3/uL (1.5-6.6)  H  05/31/23  18:25    


 


Lymphocytes # (Manual)  1.4 10^3/uL (1.5-3.5)  L  05/31/23  18:25    


 


Monocytes # (Manual)  1.1 10^3/uL (0.0-1.0)  H  05/31/23  18:25    


 


Eosinophils # (Manual)  0.0 10^3/uL (0-0.7)   05/31/23  18:25    


 


Basophils # (Manual)  0.3 10^3/uL (0-0.1)  H  05/31/23  18:25    


 


Differential Comment  MANUAL DIFFERENTIAL   05/31/23  18:25    


 


Platelet Estimate  NORMAL (130-450,000)  (NORMAL)   05/31/23  18:25    


 


Platelet Morphology  NORMAL APPEARANCE  (NORMAL)   05/31/23  18:25    


 


RBC Morph Micro Appear  1+ MACROCYTOSIS  (NORMAL)   05/31/23  18:25    


 


Sodium  142 mmol/L (135-145)   06/09/23  07:56    


 


Potassium  3.6 mmol/L (3.5-5.0)   06/09/23  07:56    


 


Chloride  108 mmol/L (101-111)   06/09/23  07:56    


 


Carbon Dioxide  27 mmol/L (21-32)   06/09/23  07:56    


 


Anion Gap  7.0  (6-13)   06/09/23  07:56    


 


BUN  11 mg/dL (6-20)   06/09/23  07:56    


 


Creatinine  0.8 mg/dL (0.6-1.2)   06/09/23  07:56    


 


Estimated GFR (MDRD)  93  (>89)   06/09/23  07:56    


 


Glucose  95 mg/dL ()   06/09/23  07:56    


 


Lactic Acid  1.6 mmol/L (0.5-2.2)   05/31/23  18:25    


 


Calcium  8.7 mg/dL (8.5-10.3)   06/09/23  07:56    


 


Magnesium  2.2 mg/dL (1.7-2.8)   06/06/23  05:02    


 


Total Bilirubin  0.5 mg/dL (0.2-1.0)   06/09/23  07:56    


 


AST  25 IU/L (10-42)   06/09/23  07:56    


 


ALT  22 IU/L (10-60)   06/09/23  07:56    


 


Alkaline Phosphatase  49 IU/L ()   06/09/23  07:56    


 


Total Protein  6.1 g/dL (6.7-8.2)  L  06/09/23  07:56    


 


Albumin  3.0 g/dL (3.2-5.5)  L  06/09/23  07:56    


 


Globulin  3.1 g/dL (2.1-4.2)   06/09/23  07:56    


 


Albumin/Globulin Ratio  1.0  (1.0-2.2)   06/09/23  07:56    


 


Vitamin B12  219 pg/mL (180-914)   06/05/23  08:45    


 


Procalcitonin  < 0.05 ng/mL (<0.5)   05/31/23  18:25    


 


Urine Color  RED/BLOODY   05/31/23  18:40    


 


Urine Clarity  TURBID  (CLEAR)   05/31/23  18:40    


 


Urine pH  6.5 PH (5.0-7.5)   05/31/23  18:40    


 


Ur Specific Gravity  1.025  (1.002-1.030)   05/31/23  18:40    


 


Urine Protein  >=300 mg/dL (NEGATIVE)  H  05/31/23  18:40    


 


Urine Glucose (UA)  NEGATIVE mg/dL (NEGATIVE)   05/31/23  18:40    


 


Urine Ketones  TRACE mg/dL (NEGATIVE)   05/31/23  18:40    


 


Urine Occult Blood  LARGE  (NEGATIVE)  H  05/31/23  18:40    


 


Urine Nitrite  POSITIVE  (NEGATIVE)  H  05/31/23  18:40    


 


Urine Bilirubin  NEGATIVE  (NEGATIVE)   05/31/23  18:40    


 


Urine Urobilinogen  1 (NORMAL) E.U./dL (NORMAL)   05/31/23  18:40    


 


Ur Leukocyte Esterase  TRACE  (NEGATIVE)  H  05/31/23  18:40    


 


Urine RBC  TNTC /HPF (0-5)  H  05/31/23  18:40    


 


Urine WBC  4-5 /HPF (0-3)   05/31/23  18:40    


 


Ur Squamous Epith Cells  NONE SEEN  (<= Few)   05/31/23  18:40    


 


Amorphous Sediment  Moderate /LPF  05/31/23  18:40    


 


Urine Bacteria  Few /HPF (None Seen)   05/31/23  18:40    


 


Ur Microscopic Review  INDICATED   05/31/23  18:40    


 


Urine Culture Comments  INDICATED   05/31/23  18:40    


 


Nasal Adenovirus (PCR)  NOT DETECTED   05/31/23  20:15    


 


Nasal B. parapertussis DNA (PCR)  NOT DETECTED   05/31/23  20:15    


 


Nasal Coronavir 229E PCR  NOT DETECTED   05/31/23  20:15    


 


Nasal Coronavir HKU1 PCR  NOT DETECTED   05/31/23  20:15    


 


Nasal Coronavir NL63 PCR  NOT DETECTED   05/31/23  20:15    


 


Nasal Coronavir OC43 PCR  NOT DETECTED   05/31/23  20:15    


 


Nasal Enterovir/Rhinovir PCR  NOT DETECTED   05/31/23  20:15    


 


Nasal Influenza B PCR  NOT DETECTED   05/31/23  20:15    


 


Nasal Influenza A PCR  NOT DETECTED   05/31/23  20:15    


 


Nasal Parainfluen 1 PCR  NOT DETECTED   05/31/23  20:15    


 


Nasal Parainfluen 2 PCR  NOT DETECTED   05/31/23  20:15    


 


Nasal Parainfluen 3 PCR  NOT DETECTED   05/31/23  20:15    


 


Nasal Parainfluen 4 PCR  NOT DETECTED   05/31/23  20:15    


 


Nasal RSV (PCR)  NOT DETECTED   05/31/23  20:15    


 


Nasal B.pertussis DNA PCR  NOT DETECTED   05/31/23  20:15    


 


Nasal C.pneumoniae (PCR)  NOT DETECTED   05/31/23  20:15    


 


Pranav Human Metapneumo PCR  NOT DETECTED   05/31/23  20:15    


 


Nasal M.pneumoniae (PCR)  NOT DETECTED   05/31/23  20:15    


 


Nasal SARS-CoV-2 (PCR)  NOT DETECTED   05/31/23  20:15    














Sepsis Event Note (H)





- Evaluation


Current Stage of Sepsis: Sepsis


Possible source of Sepsis: positive: Unknown





- Sepsis Criteria


Sepsis Criteria: Recorded Heart Rate greater than 90 bpm, WBC count greater than

10% bands, WBC count greater than 12,000 or less than 4000





ABX Reporting


Has patient been on IV antibiotics over the past 48 hours?: No

## 2023-06-10 RX ADMIN — Medication SCH TAB: at 12:06

## 2023-06-10 RX ADMIN — SODIUM CHLORIDE, PRESERVATIVE FREE SCH: 5 INJECTION INTRAVENOUS at 14:11

## 2023-06-10 RX ADMIN — SODIUM CHLORIDE, PRESERVATIVE FREE SCH: 5 INJECTION INTRAVENOUS at 01:18

## 2023-06-10 RX ADMIN — TAMSULOSIN HYDROCHLORIDE SCH MG: 0.4 CAPSULE ORAL at 09:29

## 2023-06-10 RX ADMIN — APIXABAN SCH MG: 5 TABLET, FILM COATED ORAL at 09:30

## 2023-06-10 RX ADMIN — Medication SCH MG: at 09:30

## 2023-06-10 RX ADMIN — CYANOCOBALAMIN TAB 500 MCG SCH MCG: 500 TAB at 09:30

## 2023-06-10 RX ADMIN — ACETAMINOPHEN PRN MG: 325 TABLET ORAL at 18:44

## 2023-06-10 RX ADMIN — TAMSULOSIN HYDROCHLORIDE SCH MG: 0.4 CAPSULE ORAL at 21:14

## 2023-06-10 RX ADMIN — APIXABAN SCH MG: 5 TABLET, FILM COATED ORAL at 21:15

## 2023-06-10 RX ADMIN — LIDOCAINE SCH PATCH: 50 PATCH TOPICAL at 09:31

## 2023-06-10 RX ADMIN — NYSTATIN SCH APPLIC: 100000 POWDER TOPICAL at 18:44

## 2023-06-10 RX ADMIN — FINASTERIDE SCH MG: 5 TABLET, FILM COATED ORAL at 09:29

## 2023-06-10 RX ADMIN — SODIUM CHLORIDE, PRESERVATIVE FREE SCH ML: 5 INJECTION INTRAVENOUS at 09:31

## 2023-06-10 RX ADMIN — SODIUM CHLORIDE, PRESERVATIVE FREE SCH: 5 INJECTION INTRAVENOUS at 17:16

## 2023-06-10 RX ADMIN — NYSTATIN SCH APPLIC: 100000 POWDER TOPICAL at 09:31

## 2023-06-10 NOTE — PROVIDER PROGRESS NOTE
Assessment/Plan





- Problem List


(1) UTI (urinary tract infection)


Qualifiers: 


   Urinary tract infection type: acute cystitis   Hematuria presence: without 

hematuria   Qualified Code(s): N30.00 - Acute cystitis without hematuria   


Assessment/Plan: 


Assessment/Plan: 


This patient's recent history and antibiotic use is complex however: He had a 

UTI that was being treated with Cipro, and on the urine cx from 5/19/23, he grew

E coli and Proteus mirabilis. I think he had not completed the Cipro course of 

treatment. Then he came to the ER on 5/29 due to hematuria, and blood and ur cx 

were drawn, and the ED provider sent him home on Cefpodoxime. Then that 5/29 bld

cx turned pos for Staph epi, felt to be a contaminant, but he was called on 5/31

and his wife reported he was worse. He came in now on 5/31 and had a fever on 

6/1. From this latest ER presentation, his urine cx has no growth and bld cx 

have no growth.


The admitting telemedicine doctor felt he had sepsis caused by a UTI.  He 

received Cefepime in the ED and has been continued on empiric iv Ceftriaxone 

daily.


I reviewed this entire sequence of events, results and antibx used with our 

pharmacist.  He probably has a semitreated UTI.  I reviewed all labs. Since adriel

ng this admission his white bld count has gone from 11.2 >> 13.7 >> 9.8 >> 11.4 

>> 10.7 yesterday>> 10.1 today, I kept him on iv Ceftriaxone, which the E coli 

and Proteus are sens to. 


IVF were stopped.


Plan:


Will change ceftriaxone IV daily to oral Levaquin, based on the sensitivity 

profiles of the E coli and Proteus. This choice was reviewed with jan Estevez today. I will plan a 21-day total course of antibiotics for good prostate

penetration. He has gotten 6 days of iv antibx, so 15 more days using Levaquin 

once daily, plus a probiotic. Today is Day 1 of 15 days.


I want to assess him after at least 24 hours of being on an oral antibiotic to 

assure that there is no return of fever or rising WBC.


I reviewed all the above with the daughter and daughter-in-law at bedside today





(2) Acute urinary retention


His home prostate meds Tamsolusin and Finasteride are being given here.


This patient has had several episodes of urinary retention greater than 1 L. He 

had a Pineda in which was discontinued 2 days ago after bladder training.  

However, again last night Telemedicine doctor was called because the patient had

2 L found on bladder scanning and Pineda catheter insertion was ordered. 


Because of his dementia he has tried to pull out his Pineda, when he last had it,

therefore soft restraints of upper extremities were needed


Plan:


Unfortunately we again have to order upper extremity restraints so he does not 

pull out his Pineda. 


The other alternative is to not use a chronic Pineda, but to schedule straight 

cathing for him possibly twice daily or at least daily, [facility]. SW checked if that center would do straight caths and they cannot. 

I discussed this with the wife at bedside, and daughter-in-law present.





June 6, 2023-we will straight cath every 6 hours due to significant urinary 

retention this will be as needed postvoid residual greater than 300.  Will need 

SNF placement





June 8, 2023 awaiting SNF placement





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


On 6/4 the wife told me that the patient was able to walk without walker until 

about 2 weeks ago.  Then he got weak and has not been able to stand. The patient

now needs two person assist to stand or move, he thinks he is falling, he did 

not know what to do with his utensils to feed himself


I resumed his home med Quietipine


PT and OT evaluated him but he is not a candidate for rehab at a SNF since he is

not directable due to his dementia. I updated the wife about this today, and 

said that he now may be bedbound or need to be put in a lift to be transferred 

from bed to a chair. She understood.


The wife confirmed that she [facility]. 


SW checked if that center would do straight caths and they cannot. I discussed 

this with the wife at bedside, and daughter-in-law present.





June 8, 2023 Will increase Seroquel to twice daily to better control his mood





(4) HTN


This is a new diagnosis for him.  His BP is as high as 180s over 100s. His nurse

suspected that it was because he has poor pain control.  He cannot tell us where

his pain is because of his dementia


As needed IV Hydralazine was ordered by the telemedicine doctor


I addressed his pain control and started him on Amlodipine 5 mg daily 


Plan:


Continue Amlodipine daily





(5) Low back pain


I spoke to his wife on 6/4 , asking where the source of his pain usually is and 

she said she thinks it's his lower back. I informed her that using narcotics for

pain control will definitely add to his confusion and somnolence. I ordered a 

Lidocaine patch scheduled daily, not prn


Plan:


Continue pain control with a scheduled Lidocaine patch


I have decreased his oxycodone from 5 mg every 6 hours prn to 2.5 mg every 8 

hours prn.





(6) Lethargy


Only since he has gotten morphine or oxycodone has he been hypersomnolent.


Plan:


I will decrease his narcotic pain med dose oxycodone from 5 mg every 6 hours prn

to 2.5 mg every 8 hours prn.





(7) Chronic A-fib


Plan:


The patient was on Pradaxa, and a DOAC has been continued here.


He has not been ordered to have telemetry (since I expected him to try to pull 

the patches off too), but his vital signs show stable heart rate





(8) Sepsis


RESOLVED


Patient had criteria for sepsis: White blood count 13.7, fever of 38.0 at 

midnight, tachycardic with heart rate of 100 at rest. IVF were stopped.


Plan:


Continue with antibx























(2) Pain in left hand


Assessment/Plan: 


Check left hand x-ray








- Current Meds


Current Meds: 





                               Current Medications











Generic Name Dose Route Start Last Admin





  Trade Name Freq  PRN Reason Stop Dose Admin


 


Acetaminophen  650 mg  05/31/23 19:45  06/09/23 23:42





  Acetaminophen 325 Mg Tablet  PO   650 mg





  Q4HR PRN   Administration





  Pain 1 to 4, or Fever  


 


Amlodipine Besylate  5 mg  06/04/23 09:00  06/10/23 09:30





  Amlodipine 5 Mg Tablet  PO   5 mg





  DAILY SEMAJ   Administration


 


Apixaban  5 mg  06/01/23 09:00  06/10/23 09:30





  Apixaban 5 Mg Tablet  PO   5 mg





  BID SEMAJ   Administration


 


Calamine  1 applic  06/05/23 17:18  06/08/23 20:04





  Calamine/Zinc Oxide 177 Ml Bottle  TOP   1 applic





  PRN PRN   Administration





  SKIN CARE  


 


Cyanocobalamin  500 mcg  06/07/23 10:00  06/10/23 09:30





  Cyanocobalamin 500 Mcg Tablet  PO   500 mcg





  DAILY SEMAJ   Administration


 


Finasteride  5 mg  06/01/23 09:00  06/10/23 09:29





  Finasteride 5 Mg Tablet  PO   5 mg





  DAILY SEMAJ   Administration


 


Levofloxacin  500 mg  06/06/23 09:00  06/10/23 09:30





  Levofloxacin 250 Mg Tablet  PO   500 mg





  DAILY SEMAJ   Administration


 


Lidocaine  1 patch  06/05/23 09:00  06/10/23 09:31





  Lidocaine Patch 5%  TOP   1 patch





  DAILY SEMAJ   Administration


 


Multi-Ingredient Ointment  1 applic  06/01/23 21:11  06/02/23 08:35





  Zinc Oxide 20% Oint 30 Gm Tube  TOP   1 applic





  PRN PRN   Administration





  Skin Care  


 


Multivitamins/Minerals  1 tab  06/05/23 17:00  06/10/23 12:06





  Multivitamin W/Minerals Tablet  PO   1 tab





  QDLUNCH SEMAJ   Administration


 


Nystatin  1 applic  06/02/23 21:00  06/10/23 09:31





  Nystatin Powder 15 Gm  TOP   1 applic





  BID SEMAJ   Administration


 


Oxycodone HCl  2.5 mg  06/05/23 08:40  06/08/23 22:40





  Oxycodone 5 Mg Tablet  PO   2.5 mg





  Q8H PRN   Administration





  PAIN 5-10  


 


Polyethylene Glycol  17 gm  06/06/23 12:00  06/10/23 09:31





  Polyethylene Glycol 3350 17 Gm Packet  PO   17 gm





  DAILY SEMAJ   Administration


 


Quetiapine Fumarate  75 mg  06/08/23 12:00  06/10/23 09:30





  Quetiapine 25 Mg Tablet  PO   75 mg





  BID SEMAJ   Administration


 


Saccharomyces Boulardii  250 mg  06/06/23 09:00  06/10/23 09:30





  Saccharomyces Boulardii 250 Mg Capsule  PO   250 mg





  DAILY SEMAJ   Administration


 


Sodium Chloride  10 ml  06/01/23 01:00  06/10/23 09:31





  Sodium Chloride Flush 0.9% 10 Ml Syringe  IVP   10 ml





  0100,0900,1700 SEMAJ   Administration


 


Tamsulosin HCl  0.4 mg  06/02/23 21:00  06/10/23 09:29





  Tamsulosin 0.4 Mg Capsule  PO   0.4 mg





  BID SEMAJ   Administration














- Lab Result


Fish Bone Diagrams: 


                                 06/09/23 07:56





                                 06/09/23 07:56





- Additional Planning


My Orders: 





My Active Orders





06/10/23 12:47


Hand 2 View LT [XR] Routine 














Subjective





- Subjective


Patient Reports: Resting Comfortably (Sister-in-law at bedside)





Objective


Vital Signs: 





                               Vital Signs - 24 hr











  06/09/23 06/09/23 06/10/23





  15:57 23:38 08:00


 


Temperature 36.5 C 36.9 C 36.6 C


 


Heart Rate [ 94 92 71





Brachial]   


 


Respiratory 20 16 20





Rate   


 


Blood Pressure 105/66 121/80 108/61





[Right Brachial   





artery]   


 


O2 Saturation 98 98 94








                                     Oxygen











O2 Source [With Activity]      Room air


 


O2 Source                      Room air














I&O (Last 24 Hrs): 





                          Intake and Output Totals x24h











 06/08/23 06/09/23 06/10/23





 23:59 23:59 23:59


 


Intake Total 1440 1230 600


 


Output Total 1625 2200 1650


 


Balance -185 -970 -1050











General: Alert, Cooperative


HEENT: Atraumatic


Neuro: Disoriented, Non Focal


Cardiovascular: Regular rate, Normal S1, Normal S2


Respiratory: Breath sounds nml


Abdomen: Soft


Skin: No rashes





- Results


Results: 





                               Laboratory Results











WBC  8.7 x10^3/uL (4.8-10.8)   06/09/23  07:56    


 


RBC  3.95 10^6/uL (4.70-6.10)  L  06/09/23  07:56    


 


Hgb  12.8 g/dL (14.0-18.0)  L  06/09/23  07:56    


 


Hct  38.3 % (42.0-52.0)  L  06/09/23  07:56    


 


MCV  97.0 fL (80.0-94.0)  H  06/09/23  07:56    


 


MCH  32.4 pg (27.0-31.0)  H  06/09/23  07:56    


 


MCHC  33.4 g/dL (32.0-36.0)   06/09/23  07:56    


 


RDW  13.2 % (12.0-15.0)   06/09/23  07:56    


 


Plt Count  376 10^3/uL (130-450)   06/09/23  07:56    


 


MPV  9.9 fL (7.4-11.4)   06/09/23  07:56    


 


Neut # (Auto)  5.3 10^3/uL (1.5-6.6)   06/09/23  07:56    


 


Lymph # (Auto)  1.8 10^3/uL (1.5-3.5)   06/09/23  07:56    


 


Mono # (Auto)  1.0 10^3/uL (0.0-1.0)   06/09/23  07:56    


 


Eos # (Auto)  0.5 10^3/uL (0.0-0.7)   06/09/23  07:56    


 


Baso # (Auto)  0.1 10^3/uL (0.0-0.1)   06/09/23  07:56    


 


Absolute Nucleated RBC  0.00 x10^3/uL  06/09/23  07:56    


 


Total Counted  100   05/31/23  18:25    


 


Band Neuts % (Manual)  0 % (0-10)  05/31/23  18:25    


 


Abnorm Lymph % (Manual)  0 %  05/31/23  18:25    


 


Nucleated RBC %  0.0 /100WBC  06/09/23  07:56    


 


Neutrophils # (Manual)  11.0 10^3/uL (1.5-6.6)  H  05/31/23  18:25    


 


Lymphocytes # (Manual)  1.4 10^3/uL (1.5-3.5)  L  05/31/23  18:25    


 


Monocytes # (Manual)  1.1 10^3/uL (0.0-1.0)  H  05/31/23  18:25    


 


Eosinophils # (Manual)  0.0 10^3/uL (0-0.7)   05/31/23  18:25    


 


Basophils # (Manual)  0.3 10^3/uL (0-0.1)  H  05/31/23  18:25    


 


Differential Comment  MANUAL DIFFERENTIAL   05/31/23  18:25    


 


Platelet Estimate  NORMAL (130-450,000)  (NORMAL)   05/31/23  18:25    


 


Platelet Morphology  NORMAL APPEARANCE  (NORMAL)   05/31/23  18:25    


 


RBC Morph Micro Appear  1+ MACROCYTOSIS  (NORMAL)   05/31/23  18:25    


 


Sodium  142 mmol/L (135-145)   06/09/23  07:56    


 


Potassium  3.6 mmol/L (3.5-5.0)   06/09/23  07:56    


 


Chloride  108 mmol/L (101-111)   06/09/23  07:56    


 


Carbon Dioxide  27 mmol/L (21-32)   06/09/23  07:56    


 


Anion Gap  7.0  (6-13)   06/09/23  07:56    


 


BUN  11 mg/dL (6-20)   06/09/23  07:56    


 


Creatinine  0.8 mg/dL (0.6-1.2)   06/09/23  07:56    


 


Estimated GFR (MDRD)  93  (>89)   06/09/23  07:56    


 


Glucose  95 mg/dL ()   06/09/23  07:56    


 


Lactic Acid  1.6 mmol/L (0.5-2.2)   05/31/23  18:25    


 


Calcium  8.7 mg/dL (8.5-10.3)   06/09/23  07:56    


 


Magnesium  2.2 mg/dL (1.7-2.8)   06/06/23  05:02    


 


Total Bilirubin  0.5 mg/dL (0.2-1.0)   06/09/23  07:56    


 


AST  25 IU/L (10-42)   06/09/23  07:56    


 


ALT  22 IU/L (10-60)   06/09/23  07:56    


 


Alkaline Phosphatase  49 IU/L ()   06/09/23  07:56    


 


Total Protein  6.1 g/dL (6.7-8.2)  L  06/09/23  07:56    


 


Albumin  3.0 g/dL (3.2-5.5)  L  06/09/23  07:56    


 


Globulin  3.1 g/dL (2.1-4.2)   06/09/23  07:56    


 


Albumin/Globulin Ratio  1.0  (1.0-2.2)   06/09/23  07:56    


 


Vitamin B12  219 pg/mL (180-914)   06/05/23  08:45    


 


Procalcitonin  < 0.05 ng/mL (<0.5)   05/31/23  18:25    


 


Urine Color  RED/BLOODY   05/31/23  18:40    


 


Urine Clarity  TURBID  (CLEAR)   05/31/23  18:40    


 


Urine pH  6.5 PH (5.0-7.5)   05/31/23  18:40    


 


Ur Specific Gravity  1.025  (1.002-1.030)   05/31/23  18:40    


 


Urine Protein  >=300 mg/dL (NEGATIVE)  H  05/31/23  18:40    


 


Urine Glucose (UA)  NEGATIVE mg/dL (NEGATIVE)   05/31/23  18:40    


 


Urine Ketones  TRACE mg/dL (NEGATIVE)   05/31/23  18:40    


 


Urine Occult Blood  LARGE  (NEGATIVE)  H  05/31/23  18:40    


 


Urine Nitrite  POSITIVE  (NEGATIVE)  H  05/31/23  18:40    


 


Urine Bilirubin  NEGATIVE  (NEGATIVE)   05/31/23  18:40    


 


Urine Urobilinogen  1 (NORMAL) E.U./dL (NORMAL)   05/31/23  18:40    


 


Ur Leukocyte Esterase  TRACE  (NEGATIVE)  H  05/31/23  18:40    


 


Urine RBC  TNTC /HPF (0-5)  H  05/31/23  18:40    


 


Urine WBC  4-5 /HPF (0-3)   05/31/23  18:40    


 


Ur Squamous Epith Cells  NONE SEEN  (<= Few)   05/31/23  18:40    


 


Amorphous Sediment  Moderate /LPF  05/31/23  18:40    


 


Urine Bacteria  Few /HPF (None Seen)   05/31/23  18:40    


 


Ur Microscopic Review  INDICATED   05/31/23  18:40    


 


Urine Culture Comments  INDICATED   05/31/23  18:40    


 


Nasal Adenovirus (PCR)  NOT DETECTED   05/31/23  20:15    


 


Nasal B. parapertussis DNA (PCR)  NOT DETECTED   05/31/23  20:15    


 


Nasal Coronavir 229E PCR  NOT DETECTED   05/31/23  20:15    


 


Nasal Coronavir HKU1 PCR  NOT DETECTED   05/31/23  20:15    


 


Nasal Coronavir NL63 PCR  NOT DETECTED   05/31/23  20:15    


 


Nasal Coronavir OC43 PCR  NOT DETECTED   05/31/23  20:15    


 


Nasal Enterovir/Rhinovir PCR  NOT DETECTED   05/31/23  20:15    


 


Nasal Influenza B PCR  NOT DETECTED   05/31/23  20:15    


 


Nasal Influenza A PCR  NOT DETECTED   05/31/23  20:15    


 


Nasal Parainfluen 1 PCR  NOT DETECTED   05/31/23  20:15    


 


Nasal Parainfluen 2 PCR  NOT DETECTED   05/31/23  20:15    


 


Nasal Parainfluen 3 PCR  NOT DETECTED   05/31/23  20:15    


 


Nasal Parainfluen 4 PCR  NOT DETECTED   05/31/23  20:15    


 


Nasal RSV (PCR)  NOT DETECTED   05/31/23  20:15    


 


Nasal B.pertussis DNA PCR  NOT DETECTED   05/31/23  20:15    


 


Nasal C.pneumoniae (PCR)  NOT DETECTED   05/31/23  20:15    


 


Pranav Human Metapneumo PCR  NOT DETECTED   05/31/23  20:15    


 


Nasal M.pneumoniae (PCR)  NOT DETECTED   05/31/23  20:15    


 


Nasal SARS-CoV-2 (PCR)  NOT DETECTED   05/31/23  20:15    














Sepsis Event Note (H)





- Evaluation


Current Stage of Sepsis: Sepsis


Possible source of Sepsis: positive: Unknown





- Sepsis Criteria


Sepsis Criteria: Recorded Heart Rate greater than 90 bpm, WBC count greater than

10% bands, WBC count greater than 12,000 or less than 4000





ABX Reporting


Has patient been on IV antibiotics over the past 48 hours?: No

## 2023-06-10 NOTE — XRAY REPORT
PROCEDURE:  Hand 2 View LT

 

INDICATIONS:  pain

 

TECHNIQUE:  2 views of the hand(s) acquired.  

 

COMPARISON:  None.

 

FINDINGS:  

 

Bones:  No fractures or dislocations.  No suspicious bony lesions. Degenerative changes noted involvi
ng the first carpal metacarpal joint. Second through fifth fingers are obscured 

 

Soft tissues:  No suspicious soft tissue calcifications or masses.  

 

 

IMPRESSION:  

Degenerative first carpometacarpal joint

 

 

 

Reviewed by: Jaden Randolph MD on 6/10/2023 12:58 PM [facility]

Approved by: Jaden Randolph MD on 6/10/2023 12:58 PM [facility]

 

 

Station ID:  [id number]

## 2023-06-11 RX ADMIN — FINASTERIDE SCH MG: 5 TABLET, FILM COATED ORAL at 08:39

## 2023-06-11 RX ADMIN — NYSTATIN SCH APPLIC: 100000 POWDER TOPICAL at 08:38

## 2023-06-11 RX ADMIN — Medication SCH TAB: at 11:35

## 2023-06-11 RX ADMIN — APIXABAN SCH MG: 5 TABLET, FILM COATED ORAL at 19:55

## 2023-06-11 RX ADMIN — SODIUM CHLORIDE, PRESERVATIVE FREE SCH: 5 INJECTION INTRAVENOUS at 19:56

## 2023-06-11 RX ADMIN — TAMSULOSIN HYDROCHLORIDE SCH MG: 0.4 CAPSULE ORAL at 08:39

## 2023-06-11 RX ADMIN — SODIUM CHLORIDE, PRESERVATIVE FREE SCH: 5 INJECTION INTRAVENOUS at 03:58

## 2023-06-11 RX ADMIN — APIXABAN SCH MG: 5 TABLET, FILM COATED ORAL at 08:38

## 2023-06-11 RX ADMIN — SODIUM CHLORIDE, PRESERVATIVE FREE SCH: 5 INJECTION INTRAVENOUS at 08:40

## 2023-06-11 RX ADMIN — Medication SCH MG: at 08:39

## 2023-06-11 RX ADMIN — LIDOCAINE SCH PATCH: 50 PATCH TOPICAL at 08:37

## 2023-06-11 RX ADMIN — CYANOCOBALAMIN TAB 500 MCG SCH MCG: 500 TAB at 08:39

## 2023-06-11 RX ADMIN — NYSTATIN SCH APPLIC: 100000 POWDER TOPICAL at 19:55

## 2023-06-11 RX ADMIN — TAMSULOSIN HYDROCHLORIDE SCH MG: 0.4 CAPSULE ORAL at 19:55

## 2023-06-11 NOTE — PROVIDER PROGRESS NOTE
Assessment/Plan





- Problem List


(1) UTI (urinary tract infection)


Qualifiers: 


   Urinary tract infection type: acute cystitis   Hematuria presence: without 

hematuria   Qualified Code(s): N30.00 - Acute cystitis without hematuria   


Assessment/Plan: 


Assessment/Plan: 


This patient's recent history and antibiotic use is complex however: He had a 

UTI that was being treated with Cipro, and on the urine cx from 5/19/23, he grew

E coli and Proteus mirabilis. I think he had not completed the Cipro course of 

treatment. Then he came to the ER on 5/29 due to hematuria, and blood and ur cx 

were drawn, and the ED provider sent him home on Cefpodoxime. Then that 5/29 bld

cx turned pos for Staph epi, felt to be a contaminant, but he was called on 5/31

and his wife reported he was worse. He came in now on 5/31 and had a fever on 

6/1. From this latest ER presentation, his urine cx has no growth and bld cx 

have no growth.


The admitting telemedicine doctor felt he had sepsis caused by a UTI.  He 

received Cefepime in the ED and has been continued on empiric iv Ceftriaxone 

daily.


I reviewed this entire sequence of events, results and antibx used with our 

pharmacist.  He probably has a semitreated UTI.  I reviewed all labs. Since adriel

ng this admission his white bld count has gone from 11.2 >> 13.7 >> 9.8 >> 11.4 

>> 10.7 yesterday>> 10.1 today, I kept him on iv Ceftriaxone, which the E coli 

and Proteus are sens to. 


IVF were stopped.


Plan:


Will change ceftriaxone IV daily to oral Levaquin, based on the sensitivity 

profiles of the E coli and Proteus. This choice was reviewed with jan Estevez today. I will plan a 21-day total course of antibiotics for good prostate

penetration. He has gotten 6 days of iv antibx, so 15 more days using Levaquin 

once daily, plus a probiotic. Today is Day 1 of 15 days.


I want to assess him after at least 24 hours of being on an oral antibiotic to 

assure that there is no return of fever or rising WBC.


I reviewed all the above with the daughter and daughter-in-law at bedside today





(2) Acute urinary retention


His home prostate meds Tamsolusin and Finasteride are being given here.


This patient has had several episodes of urinary retention greater than 1 L. He 

had a Pineda in which was discontinued 2 days ago after bladder training.  

However, again last night Telemedicine doctor was called because the patient had

2 L found on bladder scanning and Pineda catheter insertion was ordered. 


Because of his dementia he has tried to pull out his Pineda, when he last had it,

therefore soft restraints of upper extremities were needed


Plan:


Unfortunately we again have to order upper extremity restraints so he does not 

pull out his Pineda. 


The other alternative is to not use a chronic Pineda, but to schedule straight 

cathing for him possibly twice daily or at least daily, [facility]. SW checked if that center would do straight caths and they cannot. 

I discussed this with the wife at bedside, and daughter-in-law present.





June 6, 2023-we will straight cath every 6 hours due to significant urinary 

retention this will be as needed postvoid residual greater than 300.  Will need 

SNF placement





June 8, 2023 awaiting SNF placement





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


On 6/4 the wife told me that the patient was able to walk without walker until 

about 2 weeks ago.  Then he got weak and has not been able to stand. The patient

now needs two person assist to stand or move, he thinks he is falling, he did 

not know what to do with his utensils to feed himself


I resumed his home med Quietipine


PT and OT evaluated him but he is not a candidate for rehab at a SNF since he is

not directable due to his dementia. I updated the wife about this today, and 

said that he now may be bedbound or need to be put in a lift to be transferred 

from bed to a chair. She understood.


The wife confirmed that she [facility]. 


SW checked if that center would do straight caths and they cannot. I discussed 

this with the wife at bedside, and daughter-in-law present.





June 8, 2023 Will increase Seroquel to twice daily to better control his mood





(4) HTN


This is a new diagnosis for him.  His BP is as high as 180s over 100s. His nurse

suspected that it was because he has poor pain control.  He cannot tell us where

his pain is because of his dementia


As needed IV Hydralazine was ordered by the telemedicine doctor


I addressed his pain control and started him on Amlodipine 5 mg daily 


Plan:


Continue Amlodipine daily





(5) Low back pain


I spoke to his wife on 6/4 , asking where the source of his pain usually is and 

she said she thinks it's his lower back. I informed her that using narcotics for

pain control will definitely add to his confusion and somnolence. I ordered a 

Lidocaine patch scheduled daily, not prn


Plan:


Continue pain control with a scheduled Lidocaine patch


I have decreased his oxycodone from 5 mg every 6 hours prn to 2.5 mg every 8 

hours prn.





(6) Lethargy


Only since he has gotten morphine or oxycodone has he been hypersomnolent.


Plan:


I will decrease his narcotic pain med dose oxycodone from 5 mg every 6 hours prn

to 2.5 mg every 8 hours prn.





(7) Chronic A-fib


Plan:


The patient was on Pradaxa, and a DOAC has been continued here.


He has not been ordered to have telemetry (since I expected him to try to pull 

the patches off too), but his vital signs show stable heart rate





(8) Sepsis


RESOLVED


Patient had criteria for sepsis: White blood count 13.7, fever of 38.0 at 

midnight, tachycardic with heart rate of 100 at rest. IVF were stopped.


Plan:


Continue with antibx





(2) Pain in left hand


Assessment/Plan: 


Check left hand x-rayRich resulted as no acute findings On Marry 10














- Current Meds


Current Meds: 





                               Current Medications











Generic Name Dose Route Start Last Admin





  Trade Name Ancelmoq  PRN Reason Stop Dose Admin


 


Acetaminophen  650 mg  05/31/23 19:45  06/10/23 18:44





  Acetaminophen 325 Mg Tablet  PO   650 mg





  Q4HR PRN   Administration





  Pain 1 to 4, or Fever  


 


Amlodipine Besylate  5 mg  06/04/23 09:00  06/11/23 08:39





  Amlodipine 5 Mg Tablet  PO   5 mg





  DAILY SEMAJ   Administration


 


Apixaban  5 mg  06/01/23 09:00  06/11/23 08:38





  Apixaban 5 Mg Tablet  PO   5 mg





  BID SEMAJ   Administration


 


Calamine  1 applic  06/05/23 17:18  06/08/23 20:04





  Calamine/Zinc Oxide 177 Ml Bottle  TOP   1 applic





  PRN PRN   Administration





  SKIN CARE  


 


Cyanocobalamin  500 mcg  06/07/23 10:00  06/11/23 08:39





  Cyanocobalamin 500 Mcg Tablet  PO   500 mcg





  DAILY SEMAJ   Administration


 


Finasteride  5 mg  06/01/23 09:00  06/11/23 08:39





  Finasteride 5 Mg Tablet  PO   5 mg





  DAILY SEMAJ   Administration


 


Levofloxacin  500 mg  06/06/23 09:00  06/11/23 08:38





  Levofloxacin 250 Mg Tablet  PO   500 mg





  DAILY SEMAJ   Administration


 


Lidocaine  1 patch  06/05/23 09:00  06/11/23 08:37





  Lidocaine Patch 5%  TOP   1 patch





  DAILY SEMAJ   Administration


 


Multi-Ingredient Ointment  1 applic  06/01/23 21:11  06/02/23 08:35





  Zinc Oxide 20% Oint 30 Gm Tube  TOP   1 applic





  PRN PRN   Administration





  Skin Care  


 


Multivitamins/Minerals  1 tab  06/05/23 17:00  06/11/23 11:35





  Multivitamin W/Minerals Tablet  PO   1 tab





  QDLUNCH SEMAJ   Administration


 


Nystatin  1 applic  06/02/23 21:00  06/11/23 08:38





  Nystatin Powder 15 Gm  TOP   1 applic





  BID SEMAJ   Administration


 


Oxycodone HCl  2.5 mg  06/05/23 08:40  06/08/23 22:40





  Oxycodone 5 Mg Tablet  PO   2.5 mg





  Q8H PRN   Administration





  PAIN 5-10  


 


Polyethylene Glycol  17 gm  06/06/23 12:00  06/11/23 08:38





  Polyethylene Glycol 3350 17 Gm Packet  PO   17 gm





  DAILY SEMJA   Administration


 


Quetiapine Fumarate  75 mg  06/10/23 21:00  06/10/23 21:15





  Quetiapine 25 Mg Tablet  PO   75 mg





  QPM SEMAJ   Administration


 


Quetiapine Fumarate  25 mg  06/11/23 09:00  06/11/23 08:39





  Quetiapine 25 Mg Tablet  PO   25 mg





  DAILY SEMAJ   Administration


 


Saccharomyces Boulardii  250 mg  06/06/23 09:00  06/11/23 08:39





  Saccharomyces Boulardii 250 Mg Capsule  PO   250 mg





  DAILY SEMAJ   Administration


 


Sodium Chloride  10 ml  06/01/23 01:00  06/11/23 08:40





  Sodium Chloride Flush 0.9% 10 Ml Syringe  IVP   Not Given





  0100,0900,1700 SEMAJ  


 


Tamsulosin HCl  0.4 mg  06/02/23 21:00  06/11/23 08:39





  Tamsulosin 0.4 Mg Capsule  PO   0.4 mg





  BID SEMAJ   Administration














- Lab Result


Fish Bone Diagrams: 


                                 06/09/23 07:56





                                 06/09/23 07:56





- Additional Planning


My Orders: 





My Active Orders





06/10/23 21:00


QUEtiapine [SEROquel]   75 mg PO QPM 





06/11/23 09:00


QUEtiapine [SEROquel]   25 mg PO DAILY 














Subjective





- Subjective


Patient Reports: Feeling Better





Objective


Vital Signs: 





                               Vital Signs - 24 hr











  06/10/23 06/11/23 06/11/23





  23:58 08:25 15:37


 


Temperature 36.6 C 36.5 C 37.0 C


 


Heart Rate [ 72 61 67





Brachial]   


 


Respiratory 12 18 16





Rate   


 


Blood Pressure 123/68 112/71 117/59 L





[Right Brachial   





artery]   


 


O2 Saturation 96 94 98








                                     Oxygen











O2 Source [With Activity]      Room air


 


O2 Source                      Room air














I&O (Last 24 Hrs): 





                          Intake and Output Totals x24h











 06/09/23 06/10/23 06/11/23





 23:59 23:59 23:59


 


Intake Total 1230 1290 1200


 


Output Total 2200 2150 2650


 


Balance -970 -860 -1450











General: Alert, Other (Remains confused at baseline)


HEENT: Atraumatic


Neuro: Alert, Disoriented, Non Focal


Cardiovascular: Regular rate, Normal S1, Normal S2


Respiratory: Breath sounds nml


Abdomen: Normal bowel sounds, Soft


Skin: No rashes





- Results


Results: 





                               Laboratory Results











WBC  8.7 x10^3/uL (4.8-10.8)   06/09/23  07:56    


 


RBC  3.95 10^6/uL (4.70-6.10)  L  06/09/23  07:56    


 


Hgb  12.8 g/dL (14.0-18.0)  L  06/09/23  07:56    


 


Hct  38.3 % (42.0-52.0)  L  06/09/23  07:56    


 


MCV  97.0 fL (80.0-94.0)  H  06/09/23  07:56    


 


MCH  32.4 pg (27.0-31.0)  H  06/09/23  07:56    


 


MCHC  33.4 g/dL (32.0-36.0)   06/09/23  07:56    


 


RDW  13.2 % (12.0-15.0)   06/09/23  07:56    


 


Plt Count  376 10^3/uL (130-450)   06/09/23  07:56    


 


MPV  9.9 fL (7.4-11.4)   06/09/23  07:56    


 


Neut # (Auto)  5.3 10^3/uL (1.5-6.6)   06/09/23  07:56    


 


Lymph # (Auto)  1.8 10^3/uL (1.5-3.5)   06/09/23  07:56    


 


Mono # (Auto)  1.0 10^3/uL (0.0-1.0)   06/09/23  07:56    


 


Eos # (Auto)  0.5 10^3/uL (0.0-0.7)   06/09/23  07:56    


 


Baso # (Auto)  0.1 10^3/uL (0.0-0.1)   06/09/23  07:56    


 


Absolute Nucleated RBC  0.00 x10^3/uL  06/09/23  07:56    


 


Total Counted  100   05/31/23  18:25    


 


Band Neuts % (Manual)  0 % (0-10)  05/31/23  18:25    


 


Abnorm Lymph % (Manual)  0 %  05/31/23  18:25    


 


Nucleated RBC %  0.0 /100WBC  06/09/23  07:56    


 


Neutrophils # (Manual)  11.0 10^3/uL (1.5-6.6)  H  05/31/23  18:25    


 


Lymphocytes # (Manual)  1.4 10^3/uL (1.5-3.5)  L  05/31/23  18:25    


 


Monocytes # (Manual)  1.1 10^3/uL (0.0-1.0)  H  05/31/23  18:25    


 


Eosinophils # (Manual)  0.0 10^3/uL (0-0.7)   05/31/23  18:25    


 


Basophils # (Manual)  0.3 10^3/uL (0-0.1)  H  05/31/23  18:25    


 


Differential Comment  MANUAL DIFFERENTIAL   05/31/23  18:25    


 


Platelet Estimate  NORMAL (130-450,000)  (NORMAL)   05/31/23  18:25    


 


Platelet Morphology  NORMAL APPEARANCE  (NORMAL)   05/31/23  18:25    


 


RBC Morph Micro Appear  1+ MACROCYTOSIS  (NORMAL)   05/31/23  18:25    


 


Sodium  142 mmol/L (135-145)   06/09/23  07:56    


 


Potassium  3.6 mmol/L (3.5-5.0)   06/09/23  07:56    


 


Chloride  108 mmol/L (101-111)   06/09/23  07:56    


 


Carbon Dioxide  27 mmol/L (21-32)   06/09/23  07:56    


 


Anion Gap  7.0  (6-13)   06/09/23  07:56    


 


BUN  11 mg/dL (6-20)   06/09/23  07:56    


 


Creatinine  0.8 mg/dL (0.6-1.2)   06/09/23  07:56    


 


Estimated GFR (MDRD)  93  (>89)   06/09/23  07:56    


 


Glucose  95 mg/dL ()   06/09/23  07:56    


 


Lactic Acid  1.6 mmol/L (0.5-2.2)   05/31/23  18:25    


 


Calcium  8.7 mg/dL (8.5-10.3)   06/09/23  07:56    


 


Magnesium  2.2 mg/dL (1.7-2.8)   06/06/23  05:02    


 


Total Bilirubin  0.5 mg/dL (0.2-1.0)   06/09/23  07:56    


 


AST  25 IU/L (10-42)   06/09/23  07:56    


 


ALT  22 IU/L (10-60)   06/09/23  07:56    


 


Alkaline Phosphatase  49 IU/L ()   06/09/23  07:56    


 


Total Protein  6.1 g/dL (6.7-8.2)  L  06/09/23  07:56    


 


Albumin  3.0 g/dL (3.2-5.5)  L  06/09/23  07:56    


 


Globulin  3.1 g/dL (2.1-4.2)   06/09/23  07:56    


 


Albumin/Globulin Ratio  1.0  (1.0-2.2)   06/09/23  07:56    


 


Vitamin B12  219 pg/mL (180-914)   06/05/23  08:45    


 


Procalcitonin  < 0.05 ng/mL (<0.5)   05/31/23  18:25    


 


Urine Color  RED/BLOODY   05/31/23  18:40    


 


Urine Clarity  TURBID  (CLEAR)   05/31/23  18:40    


 


Urine pH  6.5 PH (5.0-7.5)   05/31/23  18:40    


 


Ur Specific Gravity  1.025  (1.002-1.030)   05/31/23  18:40    


 


Urine Protein  >=300 mg/dL (NEGATIVE)  H  05/31/23  18:40    


 


Urine Glucose (UA)  NEGATIVE mg/dL (NEGATIVE)   05/31/23  18:40    


 


Urine Ketones  TRACE mg/dL (NEGATIVE)   05/31/23  18:40    


 


Urine Occult Blood  LARGE  (NEGATIVE)  H  05/31/23  18:40    


 


Urine Nitrite  POSITIVE  (NEGATIVE)  H  05/31/23  18:40    


 


Urine Bilirubin  NEGATIVE  (NEGATIVE)   05/31/23  18:40    


 


Urine Urobilinogen  1 (NORMAL) E.U./dL (NORMAL)   05/31/23  18:40    


 


Ur Leukocyte Esterase  TRACE  (NEGATIVE)  H  05/31/23  18:40    


 


Urine RBC  TNTC /HPF (0-5)  H  05/31/23  18:40    


 


Urine WBC  4-5 /HPF (0-3)   05/31/23  18:40    


 


Ur Squamous Epith Cells  NONE SEEN  (<= Few)   05/31/23  18:40    


 


Amorphous Sediment  Moderate /LPF  05/31/23  18:40    


 


Urine Bacteria  Few /HPF (None Seen)   05/31/23  18:40    


 


Ur Microscopic Review  INDICATED   05/31/23  18:40    


 


Urine Culture Comments  INDICATED   05/31/23  18:40    


 


Nasal Adenovirus (PCR)  NOT DETECTED   05/31/23  20:15    


 


Nasal B. parapertussis DNA (PCR)  NOT DETECTED   05/31/23  20:15    


 


Nasal Coronavir 229E PCR  NOT DETECTED   05/31/23  20:15    


 


Nasal Coronavir HKU1 PCR  NOT DETECTED   05/31/23  20:15    


 


Nasal Coronavir NL63 PCR  NOT DETECTED   05/31/23  20:15    


 


Nasal Coronavir OC43 PCR  NOT DETECTED   05/31/23  20:15    


 


Nasal Enterovir/Rhinovir PCR  NOT DETECTED   05/31/23  20:15    


 


Nasal Influenza B PCR  NOT DETECTED   05/31/23  20:15    


 


Nasal Influenza A PCR  NOT DETECTED   05/31/23  20:15    


 


Nasal Parainfluen 1 PCR  NOT DETECTED   05/31/23  20:15    


 


Nasal Parainfluen 2 PCR  NOT DETECTED   05/31/23  20:15    


 


Nasal Parainfluen 3 PCR  NOT DETECTED   05/31/23  20:15    


 


Nasal Parainfluen 4 PCR  NOT DETECTED   05/31/23  20:15    


 


Nasal RSV (PCR)  NOT DETECTED   05/31/23  20:15    


 


Nasal B.pertussis DNA PCR  NOT DETECTED   05/31/23  20:15    


 


Nasal C.pneumoniae (PCR)  NOT DETECTED   05/31/23  20:15    


 


Pranav Human Metapneumo PCR  NOT DETECTED   05/31/23  20:15    


 


Nasal M.pneumoniae (PCR)  NOT DETECTED   05/31/23  20:15    


 


Nasal SARS-CoV-2 (PCR)  NOT DETECTED   05/31/23  20:15    














Sepsis Event Note (H)





- Evaluation


Current Stage of Sepsis: Sepsis


Possible source of Sepsis: positive: Unknown





- Sepsis Criteria


Sepsis Criteria: Recorded Heart Rate greater than 90 bpm, WBC count greater than

10% bands, WBC count greater than 12,000 or less than 4000

## 2023-06-12 RX ADMIN — CYANOCOBALAMIN TAB 500 MCG SCH MCG: 500 TAB at 08:42

## 2023-06-12 RX ADMIN — CYANOCOBALAMIN TAB 500 MCG SCH MCG: 500 TAB at 12:25

## 2023-06-12 RX ADMIN — APIXABAN SCH MG: 5 TABLET, FILM COATED ORAL at 08:42

## 2023-06-12 RX ADMIN — ACETAMINOPHEN PRN MG: 325 TABLET ORAL at 08:42

## 2023-06-12 RX ADMIN — ACETAMINOPHEN PRN MG: 325 TABLET ORAL at 18:33

## 2023-06-12 RX ADMIN — SODIUM CHLORIDE, PRESERVATIVE FREE SCH: 5 INJECTION INTRAVENOUS at 23:34

## 2023-06-12 RX ADMIN — APIXABAN SCH MG: 5 TABLET, FILM COATED ORAL at 20:26

## 2023-06-12 RX ADMIN — SENNOSIDES SCH: 8.6 TABLET, FILM COATED ORAL at 13:44

## 2023-06-12 RX ADMIN — Medication SCH MG: at 12:26

## 2023-06-12 RX ADMIN — TAMSULOSIN HYDROCHLORIDE SCH MG: 0.4 CAPSULE ORAL at 20:26

## 2023-06-12 RX ADMIN — SODIUM CHLORIDE, PRESERVATIVE FREE SCH ML: 5 INJECTION INTRAVENOUS at 03:27

## 2023-06-12 RX ADMIN — LIDOCAINE SCH PATCH: 50 PATCH TOPICAL at 08:43

## 2023-06-12 RX ADMIN — APIXABAN SCH MG: 5 TABLET, FILM COATED ORAL at 12:27

## 2023-06-12 RX ADMIN — DOCUSATE SODIUM SCH: 250 CAPSULE, LIQUID FILLED ORAL at 13:44

## 2023-06-12 RX ADMIN — Medication SCH MG: at 08:43

## 2023-06-12 RX ADMIN — ACETAMINOPHEN PRN MG: 325 TABLET ORAL at 12:25

## 2023-06-12 RX ADMIN — FERRIC OXIDE RED PRN APPLIC: 8; 8 LOTION TOPICAL at 20:32

## 2023-06-12 RX ADMIN — SODIUM CHLORIDE, PRESERVATIVE FREE SCH: 5 INJECTION INTRAVENOUS at 15:33

## 2023-06-12 RX ADMIN — FINASTERIDE SCH MG: 5 TABLET, FILM COATED ORAL at 08:43

## 2023-06-12 RX ADMIN — Medication SCH TAB: at 12:26

## 2023-06-12 RX ADMIN — NYSTATIN SCH APPLIC: 100000 POWDER TOPICAL at 18:34

## 2023-06-12 RX ADMIN — TAMSULOSIN HYDROCHLORIDE SCH MG: 0.4 CAPSULE ORAL at 08:43

## 2023-06-12 RX ADMIN — NYSTATIN SCH APPLIC: 100000 POWDER TOPICAL at 21:44

## 2023-06-12 RX ADMIN — SODIUM CHLORIDE, PRESERVATIVE FREE SCH: 5 INJECTION INTRAVENOUS at 12:27

## 2023-06-12 RX ADMIN — FINASTERIDE SCH MG: 5 TABLET, FILM COATED ORAL at 12:25

## 2023-06-12 RX ADMIN — TAMSULOSIN HYDROCHLORIDE SCH MG: 0.4 CAPSULE ORAL at 12:27

## 2023-06-12 NOTE — PROVIDER PROGRESS NOTE
Assessment/Plan





- Problem List


(1) UTI (urinary tract infection)


Qualifiers: 


   Urinary tract infection type: acute cystitis   Hematuria presence: without 

hematuria   Qualified Code(s): N30.00 - Acute cystitis without hematuria   


Assessment/Plan: 


Assessment/Plan: 


This patient's recent history and antibiotic use is complex however: He had a 

UTI that was being treated with Cipro, and on the urine cx from 5/19/23, he grew

E coli and Proteus mirabilis. I think he had not completed the Cipro course of 

treatment. Then he came to the ER on 5/29 due to hematuria, and blood and ur cx 

were drawn, and the ED provider sent him home on Cefpodoxime. Then that 5/29 bld

cx turned pos for Staph epi, felt to be a contaminant, but he was called on 5/31

and his wife reported he was worse. He came in now on 5/31 and had a fever on 

6/1. From this latest ER presentation, his urine cx has no growth and bld cx 

have no growth.


The admitting telemedicine doctor felt he had sepsis caused by a UTI.  He 

received Cefepime in the ED and has been continued on empiric iv Ceftriaxone 

daily.


I reviewed this entire sequence of events, results and antibx used with our 

pharmacist.  He probably has a semitreated UTI.  I reviewed all labs. Since adriel

ng this admission his white bld count has gone from 11.2 >> 13.7 >> 9.8 >> 11.4 

>> 10.7 yesterday>> 10.1 today, I kept him on iv Ceftriaxone, which the E coli 

and Proteus are sens to. 


IVF were stopped.


Plan:


Will change ceftriaxone IV daily to oral Levaquin, based on the sensitivity 

profiles of the E coli and Proteus. This choice was reviewed with jan Estevez today. I will plan a 21-day total course of antibiotics for good prostate

penetration. He has gotten 6 days of iv antibx, so 15 more days using Levaquin 

once daily, plus a probiotic. Today is Day 1 of 15 days.


I want to assess him after at least 24 hours of being on an oral antibiotic to 

assure that there is no return of fever or rising WBC.


I reviewed all the above with the daughter and daughter-in-law at bedside today





(2) Acute urinary retention


His home prostate meds Tamsolusin and Finasteride are being given here.


This patient has had several episodes of urinary retention greater than 1 L. He 

had a Pineda in which was discontinued 2 days ago after bladder training.  

However, again last night Telemedicine doctor was called because the patient had

2 L found on bladder scanning and Pineda catheter insertion was ordered. 


Because of his dementia he has tried to pull out his Pineda, when he last had it,

therefore soft restraints of upper extremities were needed


Plan:


Unfortunately we again have to order upper extremity restraints so he does not 

pull out his Pineda. 


The other alternative is to not use a chronic Pineda, but to schedule straight 

cathing for him possibly twice daily or at least daily, [facility]. SW checked if that center would do straight caths and they cannot. 

I discussed this with the wife at bedside, and daughter-in-law present.





June 6, 2023-we will straight cath every 6 hours due to significant urinary 

retention this will be as needed postvoid residual greater than 300.  Will need 

SNF placement





June 8, 2023 awaiting SNF placement





June 12, 2023-patient is been accepted at [facility] and is to transfer

on Tuesday, June 13





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


On 6/4 the wife told me that the patient was able to walk without walker until 

about 2 weeks ago.  Then he got weak and has not been able to stand. The patient

now needs two person assist to stand or move, he thinks he is falling, he did 

not know what to do with his utensils to feed himself


I resumed his home med Quietipine


PT and OT evaluated him but he is not a candidate for rehab at a SNF since he is

not directable due to his dementia. I updated the wife about this today, and 

said that he now may be bedbound or need to be put in a lift to be transferred 

from bed to a chair. She understood.


The wife confirmed that she [facility]. 


SW checked if that center would do straight caths and they cannot. I discussed 

this with the wife at bedside, and daughter-in-law present.





June 8, 2023 Will increase Seroquel to twice daily to better control his mood.  

He was somewhat more lethargic during the morning because of the dosing and we 

have been working on titrating to an appropriate dose so he is not combative 

during the morning hours but is not lethargic either





(4) HTN


This is a new diagnosis for him.  His BP is as high as 180s over 100s. His nurse

suspected that it was because he has poor pain control.  He cannot tell us where

his pain is because of his dementia


As needed IV Hydralazine was ordered by the telemedicine doctor


I addressed his pain control and started him on Amlodipine 5 mg daily 


Plan:


Continue Amlodipine daily





(5) Low back pain


I spoke to his wife on 6/4 , asking where the source of his pain usually is and 

she said she thinks it's his lower back. I informed her that using narcotics for

pain control will definitely add to his confusion and somnolence. I ordered a 

Lidocaine patch scheduled daily, not prn


Plan:


Continue pain control with a scheduled Lidocaine patch


I have decreased his oxycodone from 5 mg every 6 hours prn to 2.5 mg every 8 

hours prn.





(6) Lethargy


Only since he has gotten morphine or oxycodone has he been hypersomnolent.


Plan:


I will decrease his narcotic pain med dose oxycodone from 5 mg every 6 hours prn

to 2.5 mg every 8 hours prn.





(7) Chronic A-fib


Plan:


The patient was on Pradaxa, and a DOAC has been continued here.


He has not been ordered to have telemetry (since I expected him to try to pull 

the patches off too), but his vital signs show stable heart rate





(8) Sepsis


RESOLVED


Patient had criteria for sepsis: White blood count 13.7, fever of 38.0 at 

midnight, tachycardic with heart rate of 100 at rest. IVF were stopped.


Plan:


Continue with antibx





(2) Pain in left hand


Assessment/Plan: 


Check left hand x-rayRich resulted as no acute findings On Marry 10














- Current Meds


Current Meds: 





                               Current Medications











Generic Name Dose Route Start Last Admin





  Trade Name Freq  PRN Reason Stop Dose Admin


 


Acetaminophen  650 mg  05/31/23 19:45  06/12/23 12:25





  Acetaminophen 325 Mg Tablet  PO   650 mg





  Q4HR PRN   Administration





  Pain 1 to 4, or Fever  


 


Amlodipine Besylate  5 mg  06/04/23 09:00  06/12/23 12:26





  Amlodipine 5 Mg Tablet  PO   5 mg





  DAILY SEMAJ   Administration


 


Apixaban  5 mg  06/01/23 09:00  06/12/23 12:27





  Apixaban 5 Mg Tablet  PO   5 mg





  BID SEMAJ   Administration


 


Calamine  1 applic  06/05/23 17:18  06/08/23 20:04





  Calamine/Zinc Oxide 177 Ml Bottle  TOP   1 applic





  PRN PRN   Administration





  SKIN CARE  


 


Cyanocobalamin  500 mcg  06/07/23 10:00  06/12/23 12:25





  Cyanocobalamin 500 Mcg Tablet  PO   500 mcg





  DAILY SEMAJ   Administration


 


Docusate Sodium  250 - 500 mg  06/12/23 13:00  06/12/23 13:44





  Docusate Sodium 250 Mg Capsule  PO   Not Given





  DAILY SEMAJ  


 


Finasteride  5 mg  06/01/23 09:00  06/12/23 12:25





  Finasteride 5 Mg Tablet  PO   5 mg





  DAILY SEMAJ   Administration


 


Levofloxacin  500 mg  06/06/23 09:00  06/12/23 12:25





  Levofloxacin 250 Mg Tablet  PO   500 mg





  DAILY SEMAJ   Administration


 


Lidocaine  1 patch  06/05/23 09:00  06/12/23 08:43





  Lidocaine Patch 5%  TOP   1 patch





  DAILY SEMAJ   Administration


 


Multi-Ingredient Ointment  1 applic  06/01/23 21:11  06/02/23 08:35





  Zinc Oxide 20% Oint 30 Gm Tube  TOP   1 applic





  PRN PRN   Administration





  Skin Care  


 


Multivitamins/Minerals  1 tab  06/05/23 17:00  06/12/23 12:26





  Multivitamin W/Minerals Tablet  PO   1 tab





  QDLUNCH SEMAJ   Administration


 


Nystatin  1 applic  06/02/23 21:00  06/11/23 19:55





  Nystatin Powder 15 Gm  TOP   1 applic





  BID SEMAJ   Administration


 


Oxycodone HCl  2.5 mg  06/05/23 08:40  06/08/23 22:40





  Oxycodone 5 Mg Tablet  PO   2.5 mg





  Q8H PRN   Administration





  PAIN 5-10  


 


Polyethylene Glycol  17 gm  06/06/23 12:00  06/12/23 12:26





  Polyethylene Glycol 3350 17 Gm Packet  PO   17 gm





  DAILY SEMAJ   Administration


 


Quetiapine Fumarate  75 mg  06/10/23 21:00  06/11/23 19:55





  Quetiapine 25 Mg Tablet  PO   75 mg





  QPM SEMAJ   Administration


 


Saccharomyces Boulardii  250 mg  06/06/23 09:00  06/12/23 12:26





  Saccharomyces Boulardii 250 Mg Capsule  PO   250 mg





  DAILY SEMAJ   Administration


 


Senna  8.6 - 17.2 mg  06/12/23 13:00  06/12/23 13:44





  Senna 8.6 Mg Tablet  PO   Not Given





  DAILY SEMAJ  


 


Sodium Chloride  10 ml  06/01/23 01:00  06/12/23 12:27





  Sodium Chloride Flush 0.9% 10 Ml Syringe  IVP   Not Given





  0100,0900,1700 SEMAJ  


 


Tamsulosin HCl  0.4 mg  06/02/23 21:00  06/12/23 12:27





  Tamsulosin 0.4 Mg Capsule  PO   0.4 mg





  BID SEMAJ   Administration














- Lab Result


Fish Bone Diagrams: 


                                 06/09/23 07:56





                                 06/09/23 07:56





- Additional Planning


My Orders: 





My Active Orders





06/12/23 13:00


Docusate Sodium 250Mg Capsule [Colace 250Mg Capsule]   250 - 500 mg PO DAILY 


Senna [Senokot]   8.6 - 17.2 mg PO DAILY 





06/13/23 09:00


QUEtiapine [SEROquel]   12.5 mg PO DAILY 














Subjective





- Subjective


Patient Reports: Other (Patient was being moved back in bed when I saw him and 

he was in some discomfort but got back in bed and felt comfortable.)





Objective


Vital Signs: 





                               Vital Signs - 24 hr











  06/11/23 06/12/23 06/12/23





  15:37 02:35 08:00


 


Temperature 37.0 C 36.7 C 36.7 C


 


Heart Rate [ 67 70 





Brachial]   


 


Heart Rate [   99





Radial]   


 


Respiratory 16 16 18





Rate   


 


Blood Pressure 117/59 L 124/65 122/58 L





[Right Brachial   





artery]   


 


O2 Saturation 98 97 98








                                     Oxygen











O2 Source [With Activity]      Room air


 


O2 Source                      Room air














I&O (Last 24 Hrs): 





                          Intake and Output Totals x24h











 06/10/23 06/11/23 06/12/23





 23:59 23:59 23:59


 


Intake Total 1290 1570 


 


Output Total 2150 2872 2939


 


Balance -297 -8965 -6660











General: Alert, Mild distress


HEENT: Atraumatic


Neck: Supple


Neuro: Non Focal


Cardiovascular: Regular rate, Normal S1, Normal S2


Respiratory: No respiratory distress


Abdomen: Soft





- Results


Results: 





                               Laboratory Results











WBC  8.7 x10^3/uL (4.8-10.8)   06/09/23  07:56    


 


RBC  3.95 10^6/uL (4.70-6.10)  L  06/09/23  07:56    


 


Hgb  12.8 g/dL (14.0-18.0)  L  06/09/23  07:56    


 


Hct  38.3 % (42.0-52.0)  L  06/09/23  07:56    


 


MCV  97.0 fL (80.0-94.0)  H  06/09/23  07:56    


 


MCH  32.4 pg (27.0-31.0)  H  06/09/23  07:56    


 


MCHC  33.4 g/dL (32.0-36.0)   06/09/23  07:56    


 


RDW  13.2 % (12.0-15.0)   06/09/23  07:56    


 


Plt Count  376 10^3/uL (130-450)   06/09/23  07:56    


 


MPV  9.9 fL (7.4-11.4)   06/09/23  07:56    


 


Neut # (Auto)  5.3 10^3/uL (1.5-6.6)   06/09/23  07:56    


 


Lymph # (Auto)  1.8 10^3/uL (1.5-3.5)   06/09/23  07:56    


 


Mono # (Auto)  1.0 10^3/uL (0.0-1.0)   06/09/23  07:56    


 


Eos # (Auto)  0.5 10^3/uL (0.0-0.7)   06/09/23  07:56    


 


Baso # (Auto)  0.1 10^3/uL (0.0-0.1)   06/09/23  07:56    


 


Absolute Nucleated RBC  0.00 x10^3/uL  06/09/23  07:56    


 


Total Counted  100   05/31/23  18:25    


 


Band Neuts % (Manual)  0 % (0-10)  05/31/23  18:25    


 


Abnorm Lymph % (Manual)  0 %  05/31/23  18:25    


 


Nucleated RBC %  0.0 /100WBC  06/09/23  07:56    


 


Neutrophils # (Manual)  11.0 10^3/uL (1.5-6.6)  H  05/31/23  18:25    


 


Lymphocytes # (Manual)  1.4 10^3/uL (1.5-3.5)  L  05/31/23  18:25    


 


Monocytes # (Manual)  1.1 10^3/uL (0.0-1.0)  H  05/31/23  18:25    


 


Eosinophils # (Manual)  0.0 10^3/uL (0-0.7)   05/31/23  18:25    


 


Basophils # (Manual)  0.3 10^3/uL (0-0.1)  H  05/31/23  18:25    


 


Differential Comment  MANUAL DIFFERENTIAL   05/31/23  18:25    


 


Platelet Estimate  NORMAL (130-450,000)  (NORMAL)   05/31/23  18:25    


 


Platelet Morphology  NORMAL APPEARANCE  (NORMAL)   05/31/23  18:25    


 


RBC Morph Micro Appear  1+ MACROCYTOSIS  (NORMAL)   05/31/23  18:25    


 


Sodium  142 mmol/L (135-145)   06/09/23  07:56    


 


Potassium  3.6 mmol/L (3.5-5.0)   06/09/23  07:56    


 


Chloride  108 mmol/L (101-111)   06/09/23  07:56    


 


Carbon Dioxide  27 mmol/L (21-32)   06/09/23  07:56    


 


Anion Gap  7.0  (6-13)   06/09/23  07:56    


 


BUN  11 mg/dL (6-20)   06/09/23  07:56    


 


Creatinine  0.8 mg/dL (0.6-1.2)   06/09/23  07:56    


 


Estimated GFR (MDRD)  93  (>89)   06/09/23  07:56    


 


Glucose  95 mg/dL ()   06/09/23  07:56    


 


Lactic Acid  1.6 mmol/L (0.5-2.2)   05/31/23  18:25    


 


Calcium  8.7 mg/dL (8.5-10.3)   06/09/23  07:56    


 


Magnesium  2.2 mg/dL (1.7-2.8)   06/06/23  05:02    


 


Total Bilirubin  0.5 mg/dL (0.2-1.0)   06/09/23  07:56    


 


AST  25 IU/L (10-42)   06/09/23  07:56    


 


ALT  22 IU/L (10-60)   06/09/23  07:56    


 


Alkaline Phosphatase  49 IU/L ()   06/09/23  07:56    


 


Total Protein  6.1 g/dL (6.7-8.2)  L  06/09/23  07:56    


 


Albumin  3.0 g/dL (3.2-5.5)  L  06/09/23  07:56    


 


Globulin  3.1 g/dL (2.1-4.2)   06/09/23  07:56    


 


Albumin/Globulin Ratio  1.0  (1.0-2.2)   06/09/23  07:56    


 


Vitamin B12  219 pg/mL (180-914)   06/05/23  08:45    


 


Procalcitonin  < 0.05 ng/mL (<0.5)   05/31/23  18:25    


 


Urine Color  RED/BLOODY   05/31/23  18:40    


 


Urine Clarity  TURBID  (CLEAR)   05/31/23  18:40    


 


Urine pH  6.5 PH (5.0-7.5)   05/31/23  18:40    


 


Ur Specific Gravity  1.025  (1.002-1.030)   05/31/23  18:40    


 


Urine Protein  >=300 mg/dL (NEGATIVE)  H  05/31/23  18:40    


 


Urine Glucose (UA)  NEGATIVE mg/dL (NEGATIVE)   05/31/23  18:40    


 


Urine Ketones  TRACE mg/dL (NEGATIVE)   05/31/23  18:40    


 


Urine Occult Blood  LARGE  (NEGATIVE)  H  05/31/23  18:40    


 


Urine Nitrite  POSITIVE  (NEGATIVE)  H  05/31/23  18:40    


 


Urine Bilirubin  NEGATIVE  (NEGATIVE)   05/31/23  18:40    


 


Urine Urobilinogen  1 (NORMAL) E.U./dL (NORMAL)   05/31/23  18:40    


 


Ur Leukocyte Esterase  TRACE  (NEGATIVE)  H  05/31/23  18:40    


 


Urine RBC  TNTC /HPF (0-5)  H  05/31/23  18:40    


 


Urine WBC  4-5 /HPF (0-3)   05/31/23  18:40    


 


Ur Squamous Epith Cells  NONE SEEN  (<= Few)   05/31/23  18:40    


 


Amorphous Sediment  Moderate /LPF  05/31/23  18:40    


 


Urine Bacteria  Few /HPF (None Seen)   05/31/23  18:40    


 


Ur Microscopic Review  INDICATED   05/31/23  18:40    


 


Urine Culture Comments  INDICATED   05/31/23  18:40    


 


Nasal Adenovirus (PCR)  NOT DETECTED   05/31/23  20:15    


 


Nasal B. parapertussis DNA (PCR)  NOT DETECTED   05/31/23  20:15    


 


Nasal Coronavir 229E PCR  NOT DETECTED   05/31/23  20:15    


 


Nasal Coronavir HKU1 PCR  NOT DETECTED   05/31/23  20:15    


 


Nasal Coronavir NL63 PCR  NOT DETECTED   05/31/23  20:15    


 


Nasal Coronavir OC43 PCR  NOT DETECTED   05/31/23  20:15    


 


Nasal Enterovir/Rhinovir PCR  NOT DETECTED   05/31/23  20:15    


 


Nasal Influenza B PCR  NOT DETECTED   05/31/23  20:15    


 


Nasal Influenza A PCR  NOT DETECTED   05/31/23  20:15    


 


Nasal Parainfluen 1 PCR  NOT DETECTED   05/31/23  20:15    


 


Nasal Parainfluen 2 PCR  NOT DETECTED   05/31/23  20:15    


 


Nasal Parainfluen 3 PCR  NOT DETECTED   05/31/23  20:15    


 


Nasal Parainfluen 4 PCR  NOT DETECTED   05/31/23  20:15    


 


Nasal RSV (PCR)  NOT DETECTED   05/31/23  20:15    


 


Nasal B.pertussis DNA PCR  NOT DETECTED   05/31/23  20:15    


 


Nasal C.pneumoniae (PCR)  NOT DETECTED   05/31/23  20:15    


 


Pranav Human Metapneumo PCR  NOT DETECTED   05/31/23  20:15    


 


Nasal M.pneumoniae (PCR)  NOT DETECTED   05/31/23  20:15    


 


Nasal SARS-CoV-2 (PCR)  NOT DETECTED   05/31/23  20:15    














Sepsis Event Note (H)





- Evaluation


Current Stage of Sepsis: Sepsis


Possible source of Sepsis: positive: Unknown





- Sepsis Criteria


Sepsis Criteria: Recorded Heart Rate greater than 90 bpm, WBC count greater than

10% bands, WBC count greater than 12,000 or less than 4000





ABX Reporting


Has patient been on IV antibiotics over the past 48 hours?: No

## 2023-06-13 ENCOUNTER — HOSPITAL ENCOUNTER (OUTPATIENT)
Dept: HOSPITAL 76 - EMS | Age: 78
Discharge: HOME | End: 2023-06-13
Attending: SPECIALIST
Payer: MEDICARE

## 2023-06-13 VITALS — SYSTOLIC BLOOD PRESSURE: 106 MMHG | DIASTOLIC BLOOD PRESSURE: 72 MMHG

## 2023-06-13 DIAGNOSIS — F03.90: ICD-10-CM

## 2023-06-13 DIAGNOSIS — A41.9: Primary | ICD-10-CM

## 2023-06-13 DIAGNOSIS — Z74.01: ICD-10-CM

## 2023-06-13 DIAGNOSIS — N39.0: ICD-10-CM

## 2023-06-13 RX ADMIN — TAMSULOSIN HYDROCHLORIDE SCH MG: 0.4 CAPSULE ORAL at 09:08

## 2023-06-13 RX ADMIN — FINASTERIDE SCH MG: 5 TABLET, FILM COATED ORAL at 09:09

## 2023-06-13 RX ADMIN — SENNOSIDES SCH MG: 8.6 TABLET, FILM COATED ORAL at 09:09

## 2023-06-13 RX ADMIN — Medication SCH MG: at 09:09

## 2023-06-13 RX ADMIN — CYANOCOBALAMIN TAB 500 MCG SCH MCG: 500 TAB at 09:09

## 2023-06-13 RX ADMIN — SODIUM CHLORIDE, PRESERVATIVE FREE SCH: 5 INJECTION INTRAVENOUS at 09:10

## 2023-06-13 RX ADMIN — NYSTATIN SCH APPLIC: 100000 POWDER TOPICAL at 09:10

## 2023-06-13 RX ADMIN — LIDOCAINE SCH PATCH: 50 PATCH TOPICAL at 09:08

## 2023-06-13 RX ADMIN — APIXABAN SCH MG: 5 TABLET, FILM COATED ORAL at 09:09

## 2023-06-13 RX ADMIN — DOCUSATE SODIUM SCH MG: 250 CAPSULE, LIQUID FILLED ORAL at 09:09

## 2023-06-13 NOTE — DISCHARGE SUMMARY
Discharge Summary


Admit Date: 05/31/23


Discharge Date: 06/13/23


Discharging Provider: Dr Pily Jarquin


Primary Care Provider: Dr Luis Neff


Condition at Discharge: Fair


Discharge Disposition: 03 SNF DC/Xfer





- HPI


History of Present Illness: 





78-year-old  male return to the emergency department via EMS for 

concerns of possible sepsis as well as positive blood cultures.  This patient 

was seen here in this emergency department on 29 May by my colleague.  At that 

time there were some concerns of abdominal pain and dysuria as well as recurrent

urinary tract infections.  Subsequently labs and urinalysis were obtained.  Labs

felt to be consistent with acute infection and patient was started on 

cefpodoxime.  At the last ED visit he had a very mild white blood cell count 

elevation of 11.2 thousand.  Lactate was not elevated.  CT of the abdomen showed

no acute findings.  Patient was discharged with prescription for cefpodoxime and

treatment of the UTI.  Subsequently blood cultures have resulted positive in one

of the 2 sets for staph epidermis.  This most likely represents a contaminant 

but when patient's wife was contacted at home she reported the patient had been 

febrile up to 102, more lethargic and confused from known baseline. EMS reported

that they had given him some cold IV fluids in route to the ER and on 

presentation here he is afebrile.  He is confused but without focal deficits.





Given the patient's dementia history is obtained from the chart as well as EMS 

and previous ED records. Patient was discussed in detail with ER team, patient 

has dementia and was not able to give me any meaningful history on the 

televideo, in the back group his telemetry did show A fib with rate in 's.

Patient given one dose of Vanco and Cefepime. As per his wife patient is DNR and

plan is to send him to memory care unit once he is discharged from this 

hospitalization. Wife does want medical management 











- HOSPITAL COURSE


Hospital Course: 





(1) Sepsis


Patient met criteria for sepsis: he was tachy at 100 bpm, febrile to 38.0 

degrees C, had a elevated white count of 13.7 with a L shift, and was more 

obtunded than his usual confusion. He improved with IV fluids and IV 

antibiotics.





(2) UTI (urinary tract infection)


This patient's recent history and antibiotic use was complex. He had a UTI that 

was being treated with Cipro, and on the urine cx from 5/19/23, he grew E coli 

and Proteus mirabilis. He had not completed the Cipro course of treatment, when 

he came to the ER on 5/29 due to hematuria. A blood and ur cx were drawn, and 

the ED provider sent him home on Cefpodoxime. Then that 5/29 bld cx turned pos 

for Staph epi, felt to be a contaminant, but he was called on 5/31 and his wife 

reported he was worse. He came in on 5/31 and had a fever on 6/1. Initially the 

telemedicine admitting doctor felt he had sepsis caused by a UTI.  He received 

Cefepime x1 in the ED, but was continued on empiric iv Ceftriaxone daily. During

this hospitalization, his urine cx had no growth and bld cx had no growth. He 

probably had a semitreated UTI, since during this admission his WBC went from 

11.2 >> 13.7 >> 9.8 >> 11.4 >> 10.7 >> 10.1. So he was kept on iv Ceftriaxone, 

which the E coli and Proteus were sensitive to. Eventually, the IV Ceftriaxone 

was changed to oral Levaquin, based on the sensitivity profiles of the E coli 

and Proteus and he was discharged to take oral Levaquin. A 21-day total course 

of antibiotics was planned for good prostate penetration, and he  received 6 

days of iv antibx and was already on oral Levaquin while here. His discharge was

delayed several days until the NH and his insurance company arranged financing.





(3) Acute urinary retention


He was kept on Tamsolusin and Finasteride, and had a Pineda temporarily. The 

Pineda was removed but he still needed straight cath prn. 





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 


The patient had been able to walk without walker until about 2 weeks ago.  Then 

he got weak and could not stand and could not use a walker correctly. The 

patient now needed two person assist to stand or move.When upright, he thinks he

is falling. He no longer knew what to do with his utensils to feed himself. 


I resumed his home med Quietipine. PT and OT evaluated him but he was not a 

candidate for rehab at a SNF, since he was not directable due to his dementia. 

He has become bedbound or needs a lift to be transferred from bed to a chair. 





(5) HTN


This was a new diagnosis for him.  His BP was as high as 180s/100s, possibly 

initially from poor pain control (his wife assumed he had LBP). He received prn 

IV Hydralazine then needed to be started on Amlodipine 5 mg daily, and was 

discharged on this.





(6) Low back pain


PRN pain meds and scheduled topical Lidocaine daily patche were ordered.





(7) Chronic A-fib


He was kept on a DOAC for stroke prevention, while here.








- ALLERGIES


Allergies/Adverse Reactions: 


                                    Allergies











Allergy/AdvReac Type Severity Reaction Status Date / Time


 


No Known Drug Allergies Allergy   Verified 05/31/23 18:18














- MEDICATIONS


Home Medications: 


                                Ambulatory Orders











 Medication  Instructions  Recorded  Confirmed


 


Finasteride [Proscar] 5 mg PO DAILY  tablet 09/15/20 06/01/23


 


Dabigatran Etexilate Mesylate 150 mg PO BID 01/31/21 06/01/23





[Pradaxa]   


 


Mupirocin 2% Oint [Bactroban 2% 1 applic TOP BID #22 gm 05/29/23 06/01/23





Oint]   


 


Tamsulosin [Flomax] 0.4 mg PO BID 06/01/23 06/01/23


 


Acetaminophen [Tylenol] 650 mg PO Q4HR PRN #60 tab 06/13/23 


 


Cyanocobalamin [Vitamin B-12] 500 mcg PO DAILY #30 tab 06/13/23 


 


Lidocaine Patch 5% [Lidoderm Patch] 1 patch TOP DAILY #30 patch 06/13/23 


 


Multivitamin W/Minerals [Theragran 1 tab PO QDLUNCH #30 tab 06/13/23 





M]   


 


Nystatin [Nystop] 1 applic TOP BID PRN #30 each 06/13/23 


 


QUEtiapine [SEROquel] 12.5 mg PO DAILY #15 tab 06/13/23 


 


Saccharomyces Boulardii [Florastor] 250 mg PO DAILY #7 cap 06/13/23 


 


Senna [Senokot] 8.6 - 17.2 mg PO DAILY  tab 06/13/23 


 


Zinc Oxide 20% Oint [Zinc Oxide] 1 applic TOP PRN PRN  each 06/13/23 


 


amLODIPine [Norvasc] 5 mg PO DAILY #30 tab 06/13/23 


 


levoFLOXacin [Levaquin] 500 mg PO DAILY #7 tab 06/13/23 


 


polyethylene glycoL 3350 [Miralax] 17 gm PO DAILY #30 packet 06/13/23 














- PHYSICAL EXAM AT DISCHARGE


General Appearance: positive: No acute distress, Alert, Other (Tall elderly 

white male, minimally communicative.)


Eyes Bilateral: positive: Normal inspection, EOMI


ENT: positive: ENT inspection nml, No signs of dehydration


Neck: positive: Nml inspection, No JVD


Respiratory: positive: No respiratory distress, Breath sounds nml


Cardiovascular: positive: No murmur, Irregularly irregular


Abdomen: positive: Non-tender, No distention


Skin: positive: Warm, Dry


Extremities: positive: Non-tender, No pedal edema


Neurologic/Psychiatric: positive: Disoriented to person, Disoriented to place, 

Disoriented to time, Other (Has generalized weakness)





- LABS


Result Diagrams: 


                                 06/09/23 07:56





                                 06/09/23 07:56





- DIAGNOSTIC IMAGING


Diagnostic Imaging Results: Final report reviewed





- SEPSIS


Current Stage of Sepsis: Sepsis


Possible source of Sepsis: Genitourinary


Sepsis Criteria: Recorded Heart Rate greater than 90 bpm, WBC count greater than

 10% bands, WBC count greater than 12,000 or less than 4000, CNS: altered 

consciousness (unrelated to primary neuro pathology)





- FOLLOW UP


Follow Up: 





See PCP after hospital discharge.








- TIME SPENT


Time Spent in Discharge (Minutes): 50

## 2023-06-13 NOTE — DISCHARGE PLAN
Discharge Plan for SNF / [facility]





- Discharge Plan And Transition Orders


Problem Reviewed?: Yes


Disposition: 03 SNF DC/Xfer


Condition: Fair


Allergies and Adverse Reactions: 


                                    Allergies











Allergy/AdvReac Type Severity Reaction Status Date / Time


 


No Known Drug Allergies Allergy   Verified 05/31/23 18:18











Health Concerns: 


Patient was hospitalized due to a UTI and urinary retention.  He needs to 

complete several more days of antibiotic treatment for the infection.  He now 

needs to be straight cathed 4 times a day if a bladder scan shows greater than 

300 cc in the urinary bladder.





The patient has dementia but is cooperative and pleasant. Unfortunately he does 

not follow cues, he is not rehabable. To transfer him from bed to chair r car I 

now requires a lift.





Plan of Treatment: 


Continue all usual medications.





Care Goals: 


Improvement in symptoms and stabilization are the goals.





Assessment: 


The wife and other family members understand and are in agreement with the plan.








- SNF / snf Transition Orders


Admit to (Facility): [facility]


Under the care of (Name): Dr Luis Neff


Discharge Diagnosis: 





(1) Sepsis





(2) UTI (urinary tract infection)





(3) Acute urinary retention


On Tamsolusin and Finasteride but still needs straight cath prn





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 





(5) HTN





(6) Low back pain





(7) Chronic A-fib





Medicare Certification Statement: 


I certify that [facility] skilled nursing care is medically necessary on a 

continuing basis for any of the conditions for which she/he is receiving care 

during hospitalization.





Notify PCP of admission and forward orders to primary provider for signature.





Weight on admission and: Monthly


Call PCP immediately if weight increases by: 8 kg


Other Notification Orders: 


Call PCP immediately if patient develops dyspnea, chest pain/tightness or edema.





House Bowel Program: Yes


Additional Bowel Program Orders: 


If no BM after 2 days, nurse may give M.O.M. 30ml PO PRN and/or ducolax Supp 1 

SD and/or HUMERA 250mg P.O., and/or senna 1-2 tabs PO.  On day 3 nurse may give 

repeat above order until residents constipation is resolved. 





Annual Influenza Vaccine (between Sept 1st and March 31st): Yes


Two-step PPD per [facility] [phone] or approved exception documents: Yes


Treatments & Other Orders: Bladder scan the pt every 6 hours.  Straight catheter

insert as needed, if the bladder scan shows greater than 300 cc of urine.  

Patient needs to be fed or set up tray and fed all meals.


Medication Orders: 





PLEASE REFER TO THE DISCHARGE MEDICATION LIST.








Insulin Orders?: No





- Medications


New Prescriptions: 


Acetaminophen [Tylenol] 650 mg PO Q4HR PRN #60 tab


 PRN Reason: Pain 1 to 4, or Fever


Saccharomyces Boulardii [Florastor] 250 mg PO DAILY #7 cap


levoFLOXacin [Levaquin] 500 mg PO DAILY #7 tab


Lidocaine Patch 5% [Lidoderm Patch] 1 patch TOP DAILY #30 patch


polyethylene glycoL 3350 [Miralax] 17 gm PO DAILY #30 packet


amLODIPine [Norvasc] 5 mg PO DAILY #30 tab


Nystatin [Nystop] 1 applic TOP BID PRN #30 each


 PRN Reason: As Needed Per Provider Orders


QUEtiapine [SEROquel] 12.5 mg PO DAILY #15 tab


Multivitamin W/Minerals [Theragran  M] 1 tab PO QDLUNCH #30 tab


Cyanocobalamin [Vitamin B-12] 500 mcg PO DAILY #30 tab





- Diet


Type: No added salt


Texture: Regular


Liquids: Thin


May have monthly special meal: Yes





- Therapies | Activity


Rehabilitation Potential: Maintain present ADL Functional


Activity: Activity as Tolerated


Follow Up: 





See primary care provider for a hospital follow-up office visit in the next 1 to

4 weeks.

## 2023-07-14 ENCOUNTER — HOSPITAL ENCOUNTER (EMERGENCY)
Age: 78
LOS: 1 days | Discharge: NURSING, REHAB OR LONG-TERM CARE | End: 2023-07-15
Payer: COMMERCIAL

## 2023-07-14 VITALS
SYSTOLIC BLOOD PRESSURE: 141 MMHG | DIASTOLIC BLOOD PRESSURE: 76 MMHG | OXYGEN SATURATION: 96 % | RESPIRATION RATE: 24 BRPM | HEART RATE: 90 BPM

## 2023-07-14 VITALS
TEMPERATURE: 97.3 F | OXYGEN SATURATION: 99 % | RESPIRATION RATE: 19 BRPM | SYSTOLIC BLOOD PRESSURE: 163 MMHG | DIASTOLIC BLOOD PRESSURE: 84 MMHG | HEART RATE: 83 BPM

## 2023-07-14 VITALS
OXYGEN SATURATION: 96 % | HEART RATE: 91 BPM | RESPIRATION RATE: 18 BRPM | DIASTOLIC BLOOD PRESSURE: 84 MMHG | SYSTOLIC BLOOD PRESSURE: 154 MMHG

## 2023-07-14 VITALS
SYSTOLIC BLOOD PRESSURE: 160 MMHG | OXYGEN SATURATION: 98 % | RESPIRATION RATE: 11 BRPM | HEART RATE: 80 BPM | DIASTOLIC BLOOD PRESSURE: 74 MMHG

## 2023-07-14 VITALS — HEART RATE: 75 BPM | RESPIRATION RATE: 12 BRPM | OXYGEN SATURATION: 96 %

## 2023-07-14 VITALS — SYSTOLIC BLOOD PRESSURE: 128 MMHG | OXYGEN SATURATION: 94 % | DIASTOLIC BLOOD PRESSURE: 89 MMHG | HEART RATE: 77 BPM

## 2023-07-14 VITALS
OXYGEN SATURATION: 97 % | RESPIRATION RATE: 18 BRPM | DIASTOLIC BLOOD PRESSURE: 86 MMHG | SYSTOLIC BLOOD PRESSURE: 153 MMHG | HEART RATE: 91 BPM

## 2023-07-14 VITALS — RESPIRATION RATE: 18 BRPM | HEART RATE: 115 BPM

## 2023-07-14 VITALS — RESPIRATION RATE: 22 BRPM | HEART RATE: 124 BPM | OXYGEN SATURATION: 100 %

## 2023-07-14 VITALS — HEART RATE: 93 BPM | OXYGEN SATURATION: 100 %

## 2023-07-14 VITALS — OXYGEN SATURATION: 97 % | HEART RATE: 75 BPM

## 2023-07-14 VITALS — OXYGEN SATURATION: 98 % | RESPIRATION RATE: 12 BRPM | HEART RATE: 69 BPM

## 2023-07-14 VITALS — HEART RATE: 78 BPM | OXYGEN SATURATION: 96 % | RESPIRATION RATE: 14 BRPM

## 2023-07-14 VITALS — BODY MASS INDEX: 27.6 KG/M2

## 2023-07-14 DIAGNOSIS — F03.90: ICD-10-CM

## 2023-07-14 DIAGNOSIS — N39.0: Primary | ICD-10-CM

## 2023-07-14 DIAGNOSIS — M54.50: ICD-10-CM

## 2023-07-14 DIAGNOSIS — R07.9: ICD-10-CM
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
## 2023-07-14 LAB
ADD MANUAL DIFF / SLIDE REVIEW: NO
ALBUMIN SERPL-MCNC: 3.2 G/DL (ref 3.5–5)
ALBUMIN/GLOB SERPL: 1 {RATIO} (ref 1–2.8)
ALP SERPL-CCNC: 67 U/L (ref 38–126)
ALT SERPL-CCNC: 22 IU/L (ref ?–50)
BUN SERPL-MCNC: 16 MG/DL (ref 9–20)
CALCIUM SERPL-MCNC: 8.7 MG/DL (ref 8.4–10.2)
CHLORIDE SERPL-SCNC: 103 MMOL/L (ref 98–107)
CK SERPL-CCNC: 41 U/L (ref 55–170)
CO2 SERPL-SCNC: 27 MMOL/L (ref 22–32)
ESTIMATED GLOMERULAR FILT RATE: > 60 ML/MIN (ref 60–?)
GLOBULIN SER CALC-MCNC: 3.1 G/DL (ref 1.7–4.1)
GLUCOSE SERPL-MCNC: 97 MG/DL (ref 80–110)
HEMATOCRIT: 34.3 % (ref 41–53)
HEMOGLOBIN: 11.4 G/DL (ref 13.5–17.5)
HEMOLYSIS: < 15 (ref 0–50)
INR PPP: 1.3 (ref 0.9–1.3)
LIPASE SERPL-CCNC: 84 U/L (ref 23–300)
LYMPHOCYTES # SPEC AUTO: 1500 /UL (ref 1100–4500)
MAGNESIUM SERPL-MCNC: 2.2 MG/DL (ref 1.6–2.3)
MCV RBC: 92.7 FL (ref 80–100)
MEAN CORPUSCULAR HEMOGLOBIN: 30.9 PG (ref 26–34)
MEAN CORPUSCULAR HGB CONC: 33.4 % (ref 30–36)
PLATELET COUNT: 335 X10^3/UL (ref 150–400)
POTASSIUM SERPL-SCNC: 3.7 MMOL/L (ref 3.4–5.1)
PROT SERPL-MCNC: 6.3 G/DL (ref 6.3–8.2)
PROTHROMBIN TIME: 14.4 SECONDS (ref 10.1–12.7)
PTT PARTIAL THROMBOPLASTIN TIM: 27 SECONDS (ref 26–36)
SODIUM SERPL-SCNC: 135 MMOL/L (ref 137–145)
TROPONIN I SERPL-MCNC: < 0.012 NG/ML (ref 0.01–0.03)

## 2023-07-14 PROCEDURE — 80053 COMPREHEN METABOLIC PANEL: CPT

## 2023-07-14 PROCEDURE — 87086 URINE CULTURE/COLONY COUNT: CPT

## 2023-07-14 PROCEDURE — 83690 ASSAY OF LIPASE: CPT

## 2023-07-14 PROCEDURE — 93005 ELECTROCARDIOGRAM TRACING: CPT

## 2023-07-14 PROCEDURE — 85730 THROMBOPLASTIN TIME PARTIAL: CPT

## 2023-07-14 PROCEDURE — 85610 PROTHROMBIN TIME: CPT

## 2023-07-14 PROCEDURE — 83735 ASSAY OF MAGNESIUM: CPT

## 2023-07-14 PROCEDURE — 82550 ASSAY OF CK (CPK): CPT

## 2023-07-14 PROCEDURE — 81003 URINALYSIS AUTO W/O SCOPE: CPT

## 2023-07-14 PROCEDURE — 85025 COMPLETE CBC W/AUTO DIFF WBC: CPT

## 2023-07-14 PROCEDURE — 73522 X-RAY EXAM HIPS BI 3-4 VIEWS: CPT

## 2023-07-14 PROCEDURE — 36415 COLL VENOUS BLD VENIPUNCTURE: CPT

## 2023-07-14 PROCEDURE — 84484 ASSAY OF TROPONIN QUANT: CPT

## 2023-07-14 PROCEDURE — 87186 SC STD MICRODIL/AGAR DIL: CPT

## 2023-07-14 PROCEDURE — 51701 INSERT BLADDER CATHETER: CPT

## 2023-07-14 PROCEDURE — 96374 THER/PROPH/DIAG INJ IV PUSH: CPT

## 2023-07-14 PROCEDURE — 72100 X-RAY EXAM L-S SPINE 2/3 VWS: CPT

## 2023-07-14 PROCEDURE — 99284 EMERGENCY DEPT VISIT MOD MDM: CPT

## 2023-07-14 PROCEDURE — 87077 CULTURE AEROBIC IDENTIFY: CPT

## 2023-07-14 PROCEDURE — 81015 MICROSCOPIC EXAM OF URINE: CPT

## 2023-07-14 PROCEDURE — 71045 X-RAY EXAM CHEST 1 VIEW: CPT

## 2023-07-15 VITALS — SYSTOLIC BLOOD PRESSURE: 143 MMHG | OXYGEN SATURATION: 92 % | DIASTOLIC BLOOD PRESSURE: 93 MMHG | HEART RATE: 84 BPM

## 2023-07-15 VITALS — OXYGEN SATURATION: 97 % | HEART RATE: 64 BPM

## 2023-07-15 VITALS — SYSTOLIC BLOOD PRESSURE: 152 MMHG | HEART RATE: 70 BPM | DIASTOLIC BLOOD PRESSURE: 82 MMHG | OXYGEN SATURATION: 97 %

## 2023-07-15 VITALS — OXYGEN SATURATION: 97 % | HEART RATE: 69 BPM

## 2023-07-15 VITALS
SYSTOLIC BLOOD PRESSURE: 158 MMHG | OXYGEN SATURATION: 98 % | DIASTOLIC BLOOD PRESSURE: 80 MMHG | RESPIRATION RATE: 16 BRPM | HEART RATE: 76 BPM

## 2023-07-15 VITALS — OXYGEN SATURATION: 96 % | HEART RATE: 67 BPM

## 2023-07-15 VITALS — HEART RATE: 63 BPM | OXYGEN SATURATION: 97 %

## 2023-07-15 VITALS — HEART RATE: 74 BPM | OXYGEN SATURATION: 96 %

## 2023-07-15 VITALS — OXYGEN SATURATION: 99 % | HEART RATE: 52 BPM

## 2023-07-15 VITALS — HEART RATE: 69 BPM | OXYGEN SATURATION: 97 % | SYSTOLIC BLOOD PRESSURE: 155 MMHG | DIASTOLIC BLOOD PRESSURE: 73 MMHG

## 2023-07-15 VITALS — SYSTOLIC BLOOD PRESSURE: 129 MMHG | OXYGEN SATURATION: 96 % | DIASTOLIC BLOOD PRESSURE: 75 MMHG | HEART RATE: 69 BPM

## 2023-07-15 VITALS — HEART RATE: 63 BPM | OXYGEN SATURATION: 96 %

## 2023-07-15 VITALS — HEART RATE: 68 BPM | DIASTOLIC BLOOD PRESSURE: 78 MMHG | OXYGEN SATURATION: 95 % | SYSTOLIC BLOOD PRESSURE: 128 MMHG

## 2023-07-15 VITALS — HEART RATE: 60 BPM | SYSTOLIC BLOOD PRESSURE: 147 MMHG | DIASTOLIC BLOOD PRESSURE: 77 MMHG | OXYGEN SATURATION: 97 %

## 2023-07-15 VITALS — OXYGEN SATURATION: 98 % | HEART RATE: 58 BPM

## 2023-07-15 VITALS — HEART RATE: 59 BPM | DIASTOLIC BLOOD PRESSURE: 64 MMHG | SYSTOLIC BLOOD PRESSURE: 135 MMHG | OXYGEN SATURATION: 95 %

## 2023-07-15 VITALS — HEART RATE: 67 BPM | OXYGEN SATURATION: 94 % | DIASTOLIC BLOOD PRESSURE: 77 MMHG | SYSTOLIC BLOOD PRESSURE: 148 MMHG

## 2023-07-15 VITALS — HEART RATE: 69 BPM | OXYGEN SATURATION: 96 %

## 2023-07-15 VITALS — SYSTOLIC BLOOD PRESSURE: 140 MMHG | HEART RATE: 66 BPM | OXYGEN SATURATION: 97 % | DIASTOLIC BLOOD PRESSURE: 69 MMHG

## 2023-07-15 VITALS — OXYGEN SATURATION: 95 % | HEART RATE: 79 BPM

## 2023-07-15 VITALS — HEART RATE: 79 BPM | RESPIRATION RATE: 12 BRPM | OXYGEN SATURATION: 93 %

## 2023-07-15 LAB — URINE COMMENTS: (no result)
